# Patient Record
Sex: FEMALE | Race: WHITE | NOT HISPANIC OR LATINO | URBAN - METROPOLITAN AREA
[De-identification: names, ages, dates, MRNs, and addresses within clinical notes are randomized per-mention and may not be internally consistent; named-entity substitution may affect disease eponyms.]

---

## 2018-04-03 ENCOUNTER — INPATIENT (INPATIENT)
Facility: HOSPITAL | Age: 78
LOS: 2 days | Discharge: NURSING, REHAB OR LONG-TERM CARE | End: 2018-04-06
Attending: INTERNAL MEDICINE | Admitting: INTERNAL MEDICINE
Payer: MEDICARE

## 2018-04-03 VITALS
TEMPERATURE: 98 F | SYSTOLIC BLOOD PRESSURE: 145 MMHG | DIASTOLIC BLOOD PRESSURE: 84 MMHG | HEART RATE: 92 BPM | RESPIRATION RATE: 18 BRPM | OXYGEN SATURATION: 97 %

## 2018-04-03 DIAGNOSIS — Z98.890 OTHER SPECIFIED POSTPROCEDURAL STATES: Chronic | ICD-10-CM

## 2018-04-03 DIAGNOSIS — M25.561 PAIN IN RIGHT KNEE: ICD-10-CM

## 2018-04-03 DIAGNOSIS — M54.5 LOW BACK PAIN: ICD-10-CM

## 2018-04-03 DIAGNOSIS — L40.50 ARTHROPATHIC PSORIASIS, UNSPECIFIED: ICD-10-CM

## 2018-04-03 DIAGNOSIS — Z98.891 HISTORY OF UTERINE SCAR FROM PREVIOUS SURGERY: Chronic | ICD-10-CM

## 2018-04-03 DIAGNOSIS — N32.81 OVERACTIVE BLADDER: ICD-10-CM

## 2018-04-03 DIAGNOSIS — Z96.612 PRESENCE OF LEFT ARTIFICIAL SHOULDER JOINT: Chronic | ICD-10-CM

## 2018-04-03 DIAGNOSIS — G47.33 OBSTRUCTIVE SLEEP APNEA (ADULT) (PEDIATRIC): ICD-10-CM

## 2018-04-03 DIAGNOSIS — L30.4 ERYTHEMA INTERTRIGO: ICD-10-CM

## 2018-04-03 DIAGNOSIS — Z29.9 ENCOUNTER FOR PROPHYLACTIC MEASURES, UNSPECIFIED: ICD-10-CM

## 2018-04-03 DIAGNOSIS — E03.9 HYPOTHYROIDISM, UNSPECIFIED: ICD-10-CM

## 2018-04-03 DIAGNOSIS — J42 UNSPECIFIED CHRONIC BRONCHITIS: ICD-10-CM

## 2018-04-03 DIAGNOSIS — E66.01 MORBID (SEVERE) OBESITY DUE TO EXCESS CALORIES: ICD-10-CM

## 2018-04-03 LAB
ALBUMIN SERPL ELPH-MCNC: 4 G/DL — SIGNIFICANT CHANGE UP (ref 3.3–5)
ALP SERPL-CCNC: 53 U/L — SIGNIFICANT CHANGE UP (ref 40–120)
ALT FLD-CCNC: 11 U/L — SIGNIFICANT CHANGE UP (ref 4–33)
APTT BLD: 30.1 SEC — SIGNIFICANT CHANGE UP (ref 27.5–37.4)
AST SERPL-CCNC: 20 U/L — SIGNIFICANT CHANGE UP (ref 4–32)
BASE EXCESS BLDV CALC-SCNC: 1 MMOL/L — SIGNIFICANT CHANGE UP
BILIRUB SERPL-MCNC: 0.4 MG/DL — SIGNIFICANT CHANGE UP (ref 0.2–1.2)
BLOOD GAS VENOUS - CREATININE: < 0.36 MG/DL — LOW (ref 0.5–1.3)
BUN SERPL-MCNC: 9 MG/DL — SIGNIFICANT CHANGE UP (ref 7–23)
CALCIUM SERPL-MCNC: 9.2 MG/DL — SIGNIFICANT CHANGE UP (ref 8.4–10.5)
CHLORIDE BLDV-SCNC: 105 MMOL/L — SIGNIFICANT CHANGE UP (ref 96–108)
CHLORIDE SERPL-SCNC: 101 MMOL/L — SIGNIFICANT CHANGE UP (ref 98–107)
CO2 SERPL-SCNC: 22 MMOL/L — SIGNIFICANT CHANGE UP (ref 22–31)
CREAT SERPL-MCNC: 0.44 MG/DL — LOW (ref 0.5–1.3)
GAS PNL BLDV: 136 MMOL/L — SIGNIFICANT CHANGE UP (ref 136–146)
GLUCOSE BLDV-MCNC: 95 — SIGNIFICANT CHANGE UP (ref 70–99)
GLUCOSE SERPL-MCNC: 100 MG/DL — HIGH (ref 70–99)
HCO3 BLDV-SCNC: 25 MMOL/L — SIGNIFICANT CHANGE UP (ref 20–27)
HCT VFR BLD CALC: 40.1 % — SIGNIFICANT CHANGE UP (ref 34.5–45)
HCT VFR BLDV CALC: 39.3 % — SIGNIFICANT CHANGE UP (ref 34.5–45)
HGB BLD-MCNC: 12.6 G/DL — SIGNIFICANT CHANGE UP (ref 11.5–15.5)
HGB BLDV-MCNC: 12.8 G/DL — SIGNIFICANT CHANGE UP (ref 11.5–15.5)
INR BLD: 1.02 — SIGNIFICANT CHANGE UP (ref 0.88–1.17)
LACTATE BLDV-MCNC: 1.6 MMOL/L — SIGNIFICANT CHANGE UP (ref 0.5–2)
MCHC RBC-ENTMCNC: 30.6 PG — SIGNIFICANT CHANGE UP (ref 27–34)
MCHC RBC-ENTMCNC: 31.4 % — LOW (ref 32–36)
MCV RBC AUTO: 97.3 FL — SIGNIFICANT CHANGE UP (ref 80–100)
NRBC # FLD: 0 — SIGNIFICANT CHANGE UP
PCO2 BLDV: 37 MMHG — LOW (ref 41–51)
PH BLDV: 7.44 PH — HIGH (ref 7.32–7.43)
PLATELET # BLD AUTO: 187 K/UL — SIGNIFICANT CHANGE UP (ref 150–400)
PMV BLD: 11 FL — SIGNIFICANT CHANGE UP (ref 7–13)
PO2 BLDV: 59 MMHG — HIGH (ref 35–40)
POTASSIUM BLDV-SCNC: 4.9 MMOL/L — HIGH (ref 3.4–4.5)
POTASSIUM SERPL-MCNC: 4.2 MMOL/L — SIGNIFICANT CHANGE UP (ref 3.5–5.3)
POTASSIUM SERPL-SCNC: 4.2 MMOL/L — SIGNIFICANT CHANGE UP (ref 3.5–5.3)
PROT SERPL-MCNC: 8 G/DL — SIGNIFICANT CHANGE UP (ref 6–8.3)
PROTHROM AB SERPL-ACNC: 11.3 SEC — SIGNIFICANT CHANGE UP (ref 9.8–13.1)
RBC # BLD: 4.12 M/UL — SIGNIFICANT CHANGE UP (ref 3.8–5.2)
RBC # FLD: 14.1 % — SIGNIFICANT CHANGE UP (ref 10.3–14.5)
SAO2 % BLDV: 89.9 % — HIGH (ref 60–85)
SODIUM SERPL-SCNC: 137 MMOL/L — SIGNIFICANT CHANGE UP (ref 135–145)
WBC # BLD: 8.77 K/UL — SIGNIFICANT CHANGE UP (ref 3.8–10.5)
WBC # FLD AUTO: 8.77 K/UL — SIGNIFICANT CHANGE UP (ref 3.8–10.5)

## 2018-04-03 PROCEDURE — 71045 X-RAY EXAM CHEST 1 VIEW: CPT | Mod: 26

## 2018-04-03 PROCEDURE — 99223 1ST HOSP IP/OBS HIGH 75: CPT

## 2018-04-03 RX ORDER — ASPIRIN/CALCIUM CARB/MAGNESIUM 324 MG
81 TABLET ORAL DAILY
Qty: 0 | Refills: 0 | Status: DISCONTINUED | OUTPATIENT
Start: 2018-04-03 | End: 2018-04-06

## 2018-04-03 RX ORDER — CAPSAICIN 0.025 %
1 CREAM (GRAM) TOPICAL
Qty: 0 | Refills: 0 | Status: DISCONTINUED | OUTPATIENT
Start: 2018-04-03 | End: 2018-04-06

## 2018-04-03 RX ORDER — ENOXAPARIN SODIUM 100 MG/ML
40 INJECTION SUBCUTANEOUS EVERY 24 HOURS
Qty: 0 | Refills: 0 | Status: DISCONTINUED | OUTPATIENT
Start: 2018-04-03 | End: 2018-04-06

## 2018-04-03 RX ORDER — BACLOFEN 100 %
10 POWDER (GRAM) MISCELLANEOUS THREE TIMES A DAY
Qty: 0 | Refills: 0 | Status: DISCONTINUED | OUTPATIENT
Start: 2018-04-03 | End: 2018-04-06

## 2018-04-03 RX ORDER — LEVOTHYROXINE SODIUM 125 MCG
50 TABLET ORAL DAILY
Qty: 0 | Refills: 0 | Status: DISCONTINUED | OUTPATIENT
Start: 2018-04-03 | End: 2018-04-06

## 2018-04-03 RX ORDER — ACETAMINOPHEN 500 MG
650 TABLET ORAL EVERY 6 HOURS
Qty: 0 | Refills: 0 | Status: DISCONTINUED | OUTPATIENT
Start: 2018-04-03 | End: 2018-04-06

## 2018-04-03 RX ORDER — ASCORBIC ACID 60 MG
500 TABLET,CHEWABLE ORAL
Qty: 0 | Refills: 0 | Status: DISCONTINUED | OUTPATIENT
Start: 2018-04-03 | End: 2018-04-06

## 2018-04-03 RX ORDER — IBUPROFEN 200 MG
600 TABLET ORAL ONCE
Qty: 0 | Refills: 0 | Status: COMPLETED | OUTPATIENT
Start: 2018-04-03 | End: 2018-04-03

## 2018-04-03 RX ORDER — FLUTICASONE PROPIONATE 50 MCG
2 SPRAY, SUSPENSION NASAL DAILY
Qty: 0 | Refills: 0 | Status: DISCONTINUED | OUTPATIENT
Start: 2018-04-03 | End: 2018-04-06

## 2018-04-03 RX ORDER — BUDESONIDE AND FORMOTEROL FUMARATE DIHYDRATE 160; 4.5 UG/1; UG/1
2 AEROSOL RESPIRATORY (INHALATION)
Qty: 0 | Refills: 0 | Status: DISCONTINUED | OUTPATIENT
Start: 2018-04-03 | End: 2018-04-06

## 2018-04-03 RX ORDER — NYSTATIN CREAM 100000 [USP'U]/G
1 CREAM TOPICAL
Qty: 0 | Refills: 0 | Status: DISCONTINUED | OUTPATIENT
Start: 2018-04-03 | End: 2018-04-06

## 2018-04-03 RX ORDER — GABAPENTIN 400 MG/1
600 CAPSULE ORAL THREE TIMES A DAY
Qty: 0 | Refills: 0 | Status: DISCONTINUED | OUTPATIENT
Start: 2018-04-03 | End: 2018-04-06

## 2018-04-03 RX ORDER — ATORVASTATIN CALCIUM 80 MG/1
80 TABLET, FILM COATED ORAL AT BEDTIME
Qty: 0 | Refills: 0 | Status: DISCONTINUED | OUTPATIENT
Start: 2018-04-03 | End: 2018-04-06

## 2018-04-03 RX ORDER — SULFASALAZINE 500 MG
1000 TABLET ORAL
Qty: 0 | Refills: 0 | Status: DISCONTINUED | OUTPATIENT
Start: 2018-04-03 | End: 2018-04-06

## 2018-04-03 RX ORDER — LORATADINE 10 MG/1
10 TABLET ORAL DAILY
Qty: 0 | Refills: 0 | Status: DISCONTINUED | OUTPATIENT
Start: 2018-04-03 | End: 2018-04-06

## 2018-04-03 RX ORDER — MONTELUKAST 4 MG/1
10 TABLET, CHEWABLE ORAL DAILY
Qty: 0 | Refills: 0 | Status: DISCONTINUED | OUTPATIENT
Start: 2018-04-03 | End: 2018-04-06

## 2018-04-03 RX ORDER — FAMOTIDINE 10 MG/ML
20 INJECTION INTRAVENOUS
Qty: 0 | Refills: 0 | Status: DISCONTINUED | OUTPATIENT
Start: 2018-04-03 | End: 2018-04-06

## 2018-04-03 RX ORDER — CEPHALEXIN 500 MG
500 CAPSULE ORAL ONCE
Qty: 0 | Refills: 0 | Status: COMPLETED | OUTPATIENT
Start: 2018-04-03 | End: 2018-04-03

## 2018-04-03 RX ORDER — OXYBUTYNIN CHLORIDE 5 MG
5 TABLET ORAL
Qty: 0 | Refills: 0 | Status: DISCONTINUED | OUTPATIENT
Start: 2018-04-03 | End: 2018-04-06

## 2018-04-03 RX ADMIN — ENOXAPARIN SODIUM 40 MILLIGRAM(S): 100 INJECTION SUBCUTANEOUS at 22:56

## 2018-04-03 RX ADMIN — GABAPENTIN 600 MILLIGRAM(S): 400 CAPSULE ORAL at 23:20

## 2018-04-03 RX ADMIN — Medication 600 MILLIGRAM(S): at 20:46

## 2018-04-03 RX ADMIN — Medication 600 MILLIGRAM(S): at 18:33

## 2018-04-03 RX ADMIN — Medication 5 MILLIGRAM(S): at 22:56

## 2018-04-03 RX ADMIN — NYSTATIN CREAM 1 APPLICATION(S): 100000 CREAM TOPICAL at 23:21

## 2018-04-03 RX ADMIN — BUDESONIDE AND FORMOTEROL FUMARATE DIHYDRATE 2 PUFF(S): 160; 4.5 AEROSOL RESPIRATORY (INHALATION) at 23:21

## 2018-04-03 RX ADMIN — Medication 500 MILLIGRAM(S): at 18:33

## 2018-04-03 RX ADMIN — ATORVASTATIN CALCIUM 80 MILLIGRAM(S): 80 TABLET, FILM COATED ORAL at 22:56

## 2018-04-03 NOTE — H&P ADULT - PROBLEM SELECTOR PLAN 1
c/w topicals, acetaminophen PRN  fall precautions, PT consult  outpatient f/u with orthopedic surgeon  will check inflammatory markers with AM labs  no signs of infection, afebrile, no warmth/erythema of knees, no leukocytosis

## 2018-04-03 NOTE — ED PROVIDER NOTE - TOBACCO USE
bilateral upper extremity ROM was WFL (within functional limits)/bilateral lower extremity ROM was WFL (within functional limits)
Never smoker

## 2018-04-03 NOTE — H&P ADULT - ATTENDING COMMENTS
Problem #11 - Cough  likely post-viral cough  f/u official read of CXR  c/w flonase for likely PND component

## 2018-04-03 NOTE — ED ADULT NURSE NOTE - OBJECTIVE STATEMENT
p/t is a obese 77y old female received awake and responsive, c/o of pain to bilateral lower extremities for past few days, p/t denies any trauma, bloods drawn and sent to lab no further c/o noted will continue to monitor

## 2018-04-03 NOTE — H&P ADULT - NSHPPHYSICALEXAM_GEN_ALL_CORE
PHYSICAL EXAM:  GENERAL: morbidly obese, NAD, well-developed, well-nourished  HEAD:  Atraumatic, Normocephalic  EYES: EOMI, PERRLA, conjunctiva and sclera clear  NECK: Supple, No JVD  CHEST/LUNG: Clear to auscultation bilaterally; No wheezes, rales or rhonchi  HEART: Regular rate and rhythm; No murmurs, rubs, or gallops, (+)S1, S2  BACK: normal shape, no spinal bony tenderness or paraspinal tenderness to palpation; localizes back pain to sacroiliac joints  ABDOMEN: Obese, Soft, Nontender, Nondistended; Normal Bowel sounds   EXTREMITIES:  2+ Radial Pulses, 1+ pedal pulses, No clubbing, cyanosis, trace LE edema b/l  PSYCH: normal mood and affect  NEUROLOGY: AAOx3, non-focal, ROM knees intact - pain with passive movement; sensation grossly intact  SKIN: venous stasis erythema bilateral shins, healing 1cm lesions on LLE with overlying serous crusting without drainage; intertrigo in pannus; inframammary

## 2018-04-03 NOTE — ED PROVIDER NOTE - PHYSICAL EXAMINATION
Gen: AAOx3, non-toxic  Head: NCAT  HEENT: EOMI, oral mucosa moist, normal conjunctiva  Lung: CTAB, no respiratory distress, no wheezes/rhonchi/rales B/L, speaking in full sentences  CV: RRR, no murmurs, rubs or gallops  Abd: soft, NTND, no guarding  MSK: no visible deformities, full knee ROM bilaterally, erythema and rubor to bilateral shins  Neuro: No focal sensory or motor deficits  Skin: Warm, well perfused, no rash  Psych: normal affect.   ~Baylee Keys M.D. Resident

## 2018-04-03 NOTE — ED PROVIDER NOTE - OBJECTIVE STATEMENT
78 yo F PMHx hypothyroidism, psoriatic arthritis, osteoarthritis p/w bilateral knee pain. Pt lives in an assisted living facility and needs to go down stairs to the dining ang. She usually has pain in her knees but for the past week the pain has become worse and she is now having trouble walking. She has had multiple knee joint injections with last injection March 1 via orthopedics. She denies fevers/chills, numbness/tingling in her legs, CP/palpitations, SOB, abd pain, N/V, dysuria.

## 2018-04-03 NOTE — H&P ADULT - NSHPOUTPATIENTPROVIDERS_GEN_ALL_CORE
PMD - Dr. Lisha Man [phone]  Psychiatrist - Dr. Avinash Eldridge [phone] PMD - Dr. Lisha Man [phone]  Psychiatrist - Dr. Avinash Eldridge [phone]  Orthopedist - Dr. Aaron Martines  Pain management - Dr. Donaldson

## 2018-04-03 NOTE — H&P ADULT - PMH
Basal cell carcinoma  LLE s/p Moh's excision  Chronic bronchitis    Hypothyroid    Osteoarthritis    Overactive bladder    Psoriatic arthritis    Squamous cell carcinoma  LLE s/p Moh's excision

## 2018-04-03 NOTE — H&P ADULT - HISTORY OF PRESENT ILLNESS
77-year-old female with hypothyroidism, psoriatic arthritis, osteoarthritis p/w bilateral knee pain. Pt lives in an assisted living facility and needs to go down stairs to the dining ang.  She has been receiving intermittent injections in her knees, last injection was 3/01/18 by her orthopedic surgeon.    In the ED VS:  98.2  90-92  140-145/77-84  18  97%RA, received cephalexin 500mg PO x1, ibuprofen 600mg PO x1, 77-year-old female with hypothyroidism, COPD, GERD, psoriatic arthritis, osteoarthritis, SUZIE on CPAP, neuropathy, HLD, anxiety p/w bilateral knee pain. Pt lives in an assisted living facility and needs to go down stairs to the dining ang.  She has been receiving intermittent injections in her knees, last injection was 3/01/18 by her orthopedic surgeon.    In the ED VS:  98.2  90-92  140-145/77-84  18  97%RA, received cephalexin 500mg PO x1, ibuprofen 600mg PO x1, 77-year-old female with hypothyroidism, COPD, GERD, psoriatic arthritis, osteoarthritis, SUZIE on CPAP, neuropathy, HLD, anxiety p/w worsening right knee pain. Pt lives in an assisted living facility and needs to go down stairs to the dining ang, reports there are elevators in the facility however.  She has been receiving intermittent gel injections in her knees, as well as steroid injections in her lower back last injection was 3/01/18 by her orthopedic surgeon.  She reports worsening right knee pain for the past 2 weeks, worse with movement/ambulation/standing.  No known trauma.  No history of joint infections. Pain minimally relieved with acetaminophen and ibuprofen that patient takes PRN.  She denies fevers, chills, chest pain, palpitations, shortness of breath, abdominal pain, diarrhea, constipation, dysuria, hematuria, rashes/skin changes, numbness/paresthesias in groin or bilateral LE.  She reports ambulating with a walker of recent, no recent falls.  Reports a viral URI about a week ago, has dry cough, occasional nausea, decreased appetite, and continued sinus congestion that has been improving.  States redness of bilateral anterior shins appeared 2-3 months ago, had dopplers performed at the assisted living facility that were reportedly negative.    In the ED VS:  98.2  90-92  140-145/77-84  18  97%RA, received cephalexin 500mg PO x1, ibuprofen 600mg PO x1

## 2018-04-03 NOTE — ED ADULT TRIAGE NOTE - CHIEF COMPLAINT QUOTE
Pt c/o b/l leg pain x3 weeks that is not getting any better. Pt states " I was sick with a virus and I have not been able to see my Orthopedic doctor and now I can hardly walk." Pt comes from Assisted living and states that she has been unable to get out of her room to eat for 2 days. Also endorses cough and nausea. Denies CP or SOB.

## 2018-04-03 NOTE — ED PROVIDER NOTE - NS ED ROS FT
GENERAL: No fever or chills, EYES: no change in vision, HEENT: no trouble swallowing or speaking, CARDIAC: no chest pain, PULMONARY: no cough or SOB, GI: no abdominal pain, no nausea, no vomiting, no diarrhea or constipation, : No changes in urination, SKIN: no rashes, NEURO: no headache,  MSK: bilateral knee pain ~Baylee Keys M.D. Resident

## 2018-04-03 NOTE — H&P ADULT - ASSESSMENT
77-year-old female with hypothyroidism, COPD, GERD, psoriatic arthritis, osteoarthritis, SUZIE on CPAP, neuropathy, HLD, anxiety p/w worsening right knee pain.

## 2018-04-03 NOTE — H&P ADULT - NSHPSOCIALHISTORY_GEN_ALL_CORE
Lives at [facility] [phone]  HCP - son Ryland Whittaker [phone] Lives at [facility] [phone]  HCP - son Ryland Whittaker [phone]  Never smoker, no alcohol or illicit drug use

## 2018-04-03 NOTE — ED PROVIDER NOTE - ATTENDING CONTRIBUTION TO CARE
MD Gallagher:  I performed a face to face bedside interview with patient regarding history of present illness, review of symptoms and past medical history. I completed an independent physical exam(documented below).  I have discussed patient's plan of care with resident.   I agree with note as stated above, having amended the EMR as needed to reflect my findings. I have discussed the assessment and plan of care.  This includes during the time I functioned as the attending physician for this patient.  PE:  Gen: obese, Alert, NAD  Head: NC, AT,  EOMI, normal lids/conjunctiva  ENT:  normal hearing, patent oropharynx without erythema/exudate, uvula midline  Neck: +supple, no tenderness/meningismus/JVD, +Trachea midline  Chest: no chest wall tenderness, equal chest rise  Pulm: Bilateral BS, normal resp effort, no wheeze/stridor/retractions  CV: RRR, no M/R/G, +dist pulses  Abd: soft, NT/ND, +BS, no hepatosplenomegaly  Rectal: deferred  Mskel: edema of right knee compared to left, no ttp  Skin: erythema b/l ant legs, warm, +ttp  Neuro: AAOx3, no sensory/motor deficits, CN 2-12 intact   MDM:  78yo F w/ pmh inclusive of psoriatic arthritis, OA w/ frequent "gel injections", sent from [facility] for b/l leg pain from knee down (greatest at right knee). Noticed appearance of red "rash" on b/l shins today, not-pruritic. Does report "feeling warm" yesterday. No known hx of cellulitis, and unlikely to be same ( given it is b/l), but since pt just noticed erythema today and had associated subjective fever, will treat.  Do NOT suspect septic joint (able to range knee joint easily without pain) and pt is overall well appearing. No DVT risk factors, and pt had recent outpt dvt study which was negative. Pt unable to walk here, and needs to be ambulatory to return to [facility], therefore admit for PT eval and Case mngmt consult.

## 2018-04-03 NOTE — ED PROVIDER NOTE - MEDICAL DECISION MAKING DETAILS
76 yo F PMHx hypothyroidism, psoriatic arthritis, osteoarthritis p/w progressive bilateral knee pain, will obtain basic labs, pain control

## 2018-04-03 NOTE — H&P ADULT - NSHPREVIEWOFSYSTEMS_GEN_ALL_CORE
REVIEW OF SYSTEMS:    CONSTITUTIONAL: No weakness, fevers or chills  EYES/ENT: No visual changes;  No dysphagia; (+)sinus congestion   NECK: No pain or stiffness  RESPIRATORY: (+) cough; No wheezing, hemoptysis; No shortness of breath; had a viral URI about a week ago  CARDIOVASCULAR: No chest pain or palpitations; No lower extremity edema  GASTROINTESTINAL: No abdominal or epigastric pain. (+) nausea; No vomiting hematemesis; No diarrhea or constipation. No melena or hematochezia.  GENITOURINARY: No dysuria, frequency or hematuria; occasional urge incontinence due to inability to ambulate to bathroom on time  NEUROLOGICAL: No numbness or weakness; occasional paresthesias in fingertips of bilateral hands (history of carpal tunnel b/l)  SKIN: No itching, burning, rashes, or lesions   All other review of systems is negative unless indicated above.

## 2018-04-03 NOTE — H&P ADULT - FAMILY HISTORY
Father  Still living? No  Family history of CHF (congestive heart failure), Age at diagnosis: Age Unknown     Mother  Still living? Yes, Estimated age:   Family history of hypertension in mother, Age at diagnosis: Age Unknown

## 2018-04-03 NOTE — H&P ADULT - NSHPLABSRESULTS_GEN_ALL_CORE
04-03    137  |  101  |  9   ----------------------------<  100<H>  4.2   |  22  |  0.44<L>    Ca    9.2      03 Apr 2018 17:40    TPro  8.0  /  Alb  4.0  /  TBili  0.4  /  DBili  x   /  AST  20  /  ALT  11  /  AlkPhos  53  04-03                        12.6   8.77  )-----------( 187      ( 03 Apr 2018 17:40 )             40.1     LIVER FUNCTIONS - ( 03 Apr 2018 17:40 )  Alb: 4.0 g/dL / Pro: 8.0 g/dL / ALK PHOS: 53 u/L / ALT: 11 u/L / AST: 20 u/L / GGT: x           PT/INR - ( 03 Apr 2018 17:40 )   PT: 11.3 SEC;   INR: 1.02     PTT - ( 03 Apr 2018 17:40 )  PTT:30.1 SEC    17:40 - VBG - pH: 7.44  | pCO2: 37    | pO2: 59    | Lactate: 1.6      CXR personally reviewed - no focal consolidations, ?hilar LAD on R

## 2018-04-04 LAB
BASOPHILS # BLD AUTO: 0.03 K/UL — SIGNIFICANT CHANGE UP (ref 0–0.2)
BASOPHILS NFR BLD AUTO: 0.5 % — SIGNIFICANT CHANGE UP (ref 0–2)
BUN SERPL-MCNC: 11 MG/DL — SIGNIFICANT CHANGE UP (ref 7–23)
CALCIUM SERPL-MCNC: 8.6 MG/DL — SIGNIFICANT CHANGE UP (ref 8.4–10.5)
CHLORIDE SERPL-SCNC: 101 MMOL/L — SIGNIFICANT CHANGE UP (ref 98–107)
CO2 SERPL-SCNC: 26 MMOL/L — SIGNIFICANT CHANGE UP (ref 22–31)
CREAT SERPL-MCNC: 0.46 MG/DL — LOW (ref 0.5–1.3)
CRP SERPL-MCNC: 5.7 MG/L — HIGH
EOSINOPHIL # BLD AUTO: 0.19 K/UL — SIGNIFICANT CHANGE UP (ref 0–0.5)
EOSINOPHIL NFR BLD AUTO: 3.4 % — SIGNIFICANT CHANGE UP (ref 0–6)
ERYTHROCYTE [SEDIMENTATION RATE] IN BLOOD: 37 MM/HR — HIGH (ref 4–25)
GLUCOSE SERPL-MCNC: 102 MG/DL — HIGH (ref 70–99)
HBA1C BLD-MCNC: 4.8 % — SIGNIFICANT CHANGE UP (ref 4–5.6)
HCT VFR BLD CALC: 36.4 % — SIGNIFICANT CHANGE UP (ref 34.5–45)
HGB BLD-MCNC: 11.1 G/DL — LOW (ref 11.5–15.5)
IMM GRANULOCYTES # BLD AUTO: 0.02 # — SIGNIFICANT CHANGE UP
IMM GRANULOCYTES NFR BLD AUTO: 0.4 % — SIGNIFICANT CHANGE UP (ref 0–1.5)
LYMPHOCYTES # BLD AUTO: 0.72 K/UL — LOW (ref 1–3.3)
LYMPHOCYTES # BLD AUTO: 12.8 % — LOW (ref 13–44)
MCHC RBC-ENTMCNC: 29.6 PG — SIGNIFICANT CHANGE UP (ref 27–34)
MCHC RBC-ENTMCNC: 30.5 % — LOW (ref 32–36)
MCV RBC AUTO: 97.1 FL — SIGNIFICANT CHANGE UP (ref 80–100)
MONOCYTES # BLD AUTO: 0.68 K/UL — SIGNIFICANT CHANGE UP (ref 0–0.9)
MONOCYTES NFR BLD AUTO: 12.1 % — SIGNIFICANT CHANGE UP (ref 2–14)
NEUTROPHILS # BLD AUTO: 3.99 K/UL — SIGNIFICANT CHANGE UP (ref 1.8–7.4)
NEUTROPHILS NFR BLD AUTO: 70.8 % — SIGNIFICANT CHANGE UP (ref 43–77)
NRBC # FLD: 0 — SIGNIFICANT CHANGE UP
PLATELET # BLD AUTO: 171 K/UL — SIGNIFICANT CHANGE UP (ref 150–400)
PMV BLD: 11.1 FL — SIGNIFICANT CHANGE UP (ref 7–13)
POTASSIUM SERPL-MCNC: 3.4 MMOL/L — LOW (ref 3.5–5.3)
POTASSIUM SERPL-SCNC: 3.4 MMOL/L — LOW (ref 3.5–5.3)
RBC # BLD: 3.75 M/UL — LOW (ref 3.8–5.2)
RBC # FLD: 14.3 % — SIGNIFICANT CHANGE UP (ref 10.3–14.5)
SODIUM SERPL-SCNC: 138 MMOL/L — SIGNIFICANT CHANGE UP (ref 135–145)
TSH SERPL-MCNC: 2.38 UIU/ML — SIGNIFICANT CHANGE UP (ref 0.27–4.2)
WBC # BLD: 5.63 K/UL — SIGNIFICANT CHANGE UP (ref 3.8–10.5)
WBC # FLD AUTO: 5.63 K/UL — SIGNIFICANT CHANGE UP (ref 3.8–10.5)

## 2018-04-04 RX ORDER — POTASSIUM CHLORIDE 20 MEQ
40 PACKET (EA) ORAL ONCE
Qty: 0 | Refills: 0 | Status: COMPLETED | OUTPATIENT
Start: 2018-04-04 | End: 2018-04-04

## 2018-04-04 RX ORDER — OXYCODONE HYDROCHLORIDE 5 MG/1
5 TABLET ORAL EVERY 6 HOURS
Qty: 0 | Refills: 0 | Status: DISCONTINUED | OUTPATIENT
Start: 2018-04-04 | End: 2018-04-06

## 2018-04-04 RX ADMIN — Medication 5 MILLIGRAM(S): at 06:49

## 2018-04-04 RX ADMIN — Medication 1000 MILLIGRAM(S): at 06:49

## 2018-04-04 RX ADMIN — Medication 40 MILLIEQUIVALENT(S): at 14:07

## 2018-04-04 RX ADMIN — GABAPENTIN 600 MILLIGRAM(S): 400 CAPSULE ORAL at 22:26

## 2018-04-04 RX ADMIN — MONTELUKAST 10 MILLIGRAM(S): 4 TABLET, CHEWABLE ORAL at 11:54

## 2018-04-04 RX ADMIN — Medication 1 TABLET(S): at 14:08

## 2018-04-04 RX ADMIN — Medication 1 APPLICATION(S): at 06:51

## 2018-04-04 RX ADMIN — FAMOTIDINE 20 MILLIGRAM(S): 10 INJECTION INTRAVENOUS at 17:38

## 2018-04-04 RX ADMIN — Medication 81 MILLIGRAM(S): at 11:54

## 2018-04-04 RX ADMIN — BUDESONIDE AND FORMOTEROL FUMARATE DIHYDRATE 2 PUFF(S): 160; 4.5 AEROSOL RESPIRATORY (INHALATION) at 09:49

## 2018-04-04 RX ADMIN — Medication 5 MILLIGRAM(S): at 17:38

## 2018-04-04 RX ADMIN — LORATADINE 10 MILLIGRAM(S): 10 TABLET ORAL at 11:54

## 2018-04-04 RX ADMIN — Medication 650 MILLIGRAM(S): at 14:15

## 2018-04-04 RX ADMIN — NYSTATIN CREAM 1 APPLICATION(S): 100000 CREAM TOPICAL at 17:38

## 2018-04-04 RX ADMIN — OXYCODONE HYDROCHLORIDE 5 MILLIGRAM(S): 5 TABLET ORAL at 17:38

## 2018-04-04 RX ADMIN — Medication 2 SPRAY(S): at 14:08

## 2018-04-04 RX ADMIN — GABAPENTIN 600 MILLIGRAM(S): 400 CAPSULE ORAL at 06:49

## 2018-04-04 RX ADMIN — OXYCODONE HYDROCHLORIDE 5 MILLIGRAM(S): 5 TABLET ORAL at 18:30

## 2018-04-04 RX ADMIN — Medication 500 MILLIGRAM(S): at 17:38

## 2018-04-04 RX ADMIN — Medication 1000 MILLIGRAM(S): at 17:38

## 2018-04-04 RX ADMIN — ATORVASTATIN CALCIUM 80 MILLIGRAM(S): 80 TABLET, FILM COATED ORAL at 22:26

## 2018-04-04 RX ADMIN — Medication 1 APPLICATION(S): at 17:37

## 2018-04-04 RX ADMIN — FAMOTIDINE 20 MILLIGRAM(S): 10 INJECTION INTRAVENOUS at 06:50

## 2018-04-04 RX ADMIN — Medication 50 MICROGRAM(S): at 06:49

## 2018-04-04 RX ADMIN — Medication 650 MILLIGRAM(S): at 15:00

## 2018-04-04 RX ADMIN — ENOXAPARIN SODIUM 40 MILLIGRAM(S): 100 INJECTION SUBCUTANEOUS at 22:26

## 2018-04-04 RX ADMIN — BUDESONIDE AND FORMOTEROL FUMARATE DIHYDRATE 2 PUFF(S): 160; 4.5 AEROSOL RESPIRATORY (INHALATION) at 22:27

## 2018-04-04 RX ADMIN — GABAPENTIN 600 MILLIGRAM(S): 400 CAPSULE ORAL at 14:07

## 2018-04-04 RX ADMIN — NYSTATIN CREAM 1 APPLICATION(S): 100000 CREAM TOPICAL at 06:50

## 2018-04-04 NOTE — PROGRESS NOTE ADULT - SUBJECTIVE AND OBJECTIVE BOX
CHIEF COMPLAINT:Patient is a 77y old  Female who presents with a chief complaint of right knee pain (2018 20:17)      Allergies:  erythromycin (Rash)  latex (Rash)  Milk (Rash)  phenobarbital (Rash)      PAST MEDICAL & SURGICAL HISTORY:  Squamous cell carcinoma: LLE s/p Moh&#x27;s excision  Basal cell carcinoma: LLE s/p Moh&#x27;s excision  Overactive bladder  Osteoarthritis  Psoriatic arthritis  Chronic bronchitis  Hypothyroid  H/O:   H/O total shoulder replacement, left  History of carpal tunnel release: L wrist      FAMILY HISTORY:  Family history of hypertension in mother (Mother)  Family history of CHF (congestive heart failure) (Father): Father,  49y/o      REVIEW OF SYSTEMS:  CONSTITUTIONAL: No fever, weight loss, or fatigue  EYES: No eye pain, visual disturbances, or discharge  NECK: No pain or stiffness  RESPIRATORY: No cough or wheezing, no shortness of breath  CARDIOVASCULAR: No chest pain, palpitations, dizziness, or leg swelling  GASTROINTESTINAL: No abdominal or epigastric pain. No nausea, vomiting, diarrhea or constipation  GENITOURINARY: No dysuria, urinary frequency or urgency, no hematuria  NEUROLOGICAL: No headaches, memory loss, loss of strength, numbness, or tremors  SKIN: No itching, burning, rashes, or lesions   MUSCULOSKELETAL: + R knee pain    Medications:  MEDICATIONS  (STANDING):  ascorbic acid 500 milliGRAM(s) Oral two times a day  aspirin enteric coated 81 milliGRAM(s) Oral daily  atorvastatin 80 milliGRAM(s) Oral at bedtime  buDESOnide 160 MICROgram(s)/formoterol 4.5 MICROgram(s) Inhaler 2 Puff(s) Inhalation two times a day  calcium carbonate 1250 mG + Vitamin D (OsCal 500 + D) 1 Tablet(s) Oral daily  capsaicin HP 0.075% Cream 1 Application(s) Topical two times a day  enoxaparin Injectable 40 milliGRAM(s) SubCutaneous every 24 hours  famotidine    Tablet 20 milliGRAM(s) Oral two times a day  fluticasone propionate 50 MICROgram(s)/spray Nasal Spray 2 Spray(s) Both Nostrils daily  gabapentin 600 milliGRAM(s) Oral three times a day  levothyroxine 50 MICROGram(s) Oral daily  loratadine 10 milliGRAM(s) Oral daily  montelukast 10 milliGRAM(s) Oral daily  nystatin Powder 1 Application(s) Topical two times a day  oxybutynin 5 milliGRAM(s) Oral two times a day  sulfaSALAzine 1000 milliGRAM(s) Oral two times a day    MEDICATIONS  (PRN):  acetaminophen   Tablet. 650 milliGRAM(s) Oral every 6 hours PRN Mild to Moderate Pain (1 - 6)  baclofen 10 milliGRAM(s) Oral three times a day PRN back pain/spasm  oxyCODONE    IR 5 milliGRAM(s) Oral every 6 hours PRN Severe Pain (7 - 10)    	    PHYSICAL EXAM:  T(C): 37 (18 @ 13:01), Max: 37.1 (18 @ 21:39)  HR: 72 (18 @ 16:10) (68 - 94)  BP: 167/64 (18 @ 13:01) (152/62 - 167/64)  RR: 18 (18 @ 13:01) (18 - 19)  SpO2: 94% (18 @ 16:10) (93% - 97%)  Wt(kg): --  I&O's Summary      Appearance: Normal	  HEENT:   NCAT, PERRL, EOMI	  Lymphatic: No lymphadenopathy  Cardiovascular: Normal S1 S2, RRR  Respiratory: Lungs clear to auscultation BL  Psychiatry: A & O x 3, Mood & affect appropriate  Gastrointestinal:  Soft, Non-tender, + BS  Skin: No rashes, No ecchymoses, No cyanosis	  Neurologic: Non-focal  Extremities: Normal range of motion, No clubbing, cyanosis or edema    	  LABS:	 	    CARDIAC MARKERS:                                11.1   5.63  )-----------( 171      ( 2018 05:55 )             36.4         138  |  101  |  11  ----------------------------<  102<H>  3.4<L>   |  26  |  0.46<L>    Ca    8.6      2018 05:55    TPro  8.0  /  Alb  4.0  /  TBili  0.4  /  DBili  x   /  AST  20  /  ALT  11  /  AlkPhos  53      proBNP:   Lipid Profile:   HgA1c: Hemoglobin A1C, Whole Blood: 4.8 % ( @ 05:55)    TSH: Thyroid Stimulating Hormone, Serum: 2.38 uIU/mL ( @ 05:55)

## 2018-04-04 NOTE — PROGRESS NOTE ADULT - ASSESSMENT
77-year-old female with hypothyroidism, COPD, GERD, psoriatic arthritis, osteoarthritis, SUZIE on CPAP, neuropathy, HLD, anxiety p/w worsening right knee pain.      Problem/Plan - 1:  ·  Problem: Right knee pain.  Plan: pain control PRN, PT eval  outpatient f/u with orthopedic surgeon  no signs of infection, afebrile, no warmth/erythema of knees, no leukocytosis.   d/c planning     Problem/Plan - 2:  ·  Problem: Lower back pain.  Plan: c/w topicals, acetaminophen PRN  c/w sulfasalazine.      Problem/Plan - 3:  ·  Problem: Overactive bladder.  Plan: c/w vesicare.      Problem/Plan - 4:  ·  Problem: Psoriatic arthritis.  Plan: c/w sulfasalazine, pain mgmt.      Problem/Plan - 5:  ·  Problem: Chronic bronchitis.  Plan: c/w advair.      Problem/Plan - 6:  Problem: Hypothyroid. Plan: c/w levothyroxine   TSH with AM labs.     Problem/Plan - 7:  ·  Problem: Intertrigo.  Plan: nystatin, skin care.      Problem/Plan - 8:  ·  Problem: SUZIE on CPAP.  Plan: c/w CPAP QHS.      Problem/Plan - 9:  ·  Problem: Morbid obesity.  Plan: nutrition consult.      Problem/Plan - 10:  Problem: Need for prophylactic measure. Plan; Lovenox for DVT ppx.    Cough - CXR neg, c/w Flonase

## 2018-04-05 LAB
BUN SERPL-MCNC: 11 MG/DL — SIGNIFICANT CHANGE UP (ref 7–23)
CALCIUM SERPL-MCNC: 8.9 MG/DL — SIGNIFICANT CHANGE UP (ref 8.4–10.5)
CHLORIDE SERPL-SCNC: 100 MMOL/L — SIGNIFICANT CHANGE UP (ref 98–107)
CO2 SERPL-SCNC: 25 MMOL/L — SIGNIFICANT CHANGE UP (ref 22–31)
CREAT SERPL-MCNC: 0.55 MG/DL — SIGNIFICANT CHANGE UP (ref 0.5–1.3)
GLUCOSE SERPL-MCNC: 119 MG/DL — HIGH (ref 70–99)
HCT VFR BLD CALC: 39.3 % — SIGNIFICANT CHANGE UP (ref 34.5–45)
HGB BLD-MCNC: 12.1 G/DL — SIGNIFICANT CHANGE UP (ref 11.5–15.5)
MCHC RBC-ENTMCNC: 30.3 PG — SIGNIFICANT CHANGE UP (ref 27–34)
MCHC RBC-ENTMCNC: 30.8 % — LOW (ref 32–36)
MCV RBC AUTO: 98.3 FL — SIGNIFICANT CHANGE UP (ref 80–100)
NRBC # FLD: 0 — SIGNIFICANT CHANGE UP
PLATELET # BLD AUTO: 167 K/UL — SIGNIFICANT CHANGE UP (ref 150–400)
PMV BLD: 11 FL — SIGNIFICANT CHANGE UP (ref 7–13)
POTASSIUM SERPL-MCNC: 3.9 MMOL/L — SIGNIFICANT CHANGE UP (ref 3.5–5.3)
POTASSIUM SERPL-SCNC: 3.9 MMOL/L — SIGNIFICANT CHANGE UP (ref 3.5–5.3)
RBC # BLD: 4 M/UL — SIGNIFICANT CHANGE UP (ref 3.8–5.2)
RBC # FLD: 14.5 % — SIGNIFICANT CHANGE UP (ref 10.3–14.5)
SODIUM SERPL-SCNC: 138 MMOL/L — SIGNIFICANT CHANGE UP (ref 135–145)
WBC # BLD: 7.44 K/UL — SIGNIFICANT CHANGE UP (ref 3.8–10.5)
WBC # FLD AUTO: 7.44 K/UL — SIGNIFICANT CHANGE UP (ref 3.8–10.5)

## 2018-04-05 RX ADMIN — Medication 100 MILLIGRAM(S): at 06:50

## 2018-04-05 RX ADMIN — OXYCODONE HYDROCHLORIDE 5 MILLIGRAM(S): 5 TABLET ORAL at 22:05

## 2018-04-05 RX ADMIN — Medication 2 SPRAY(S): at 10:19

## 2018-04-05 RX ADMIN — Medication 50 MICROGRAM(S): at 06:27

## 2018-04-05 RX ADMIN — Medication 100 MILLIGRAM(S): at 21:34

## 2018-04-05 RX ADMIN — Medication 1000 MILLIGRAM(S): at 17:32

## 2018-04-05 RX ADMIN — OXYCODONE HYDROCHLORIDE 5 MILLIGRAM(S): 5 TABLET ORAL at 21:35

## 2018-04-05 RX ADMIN — Medication 1 APPLICATION(S): at 17:34

## 2018-04-05 RX ADMIN — GABAPENTIN 600 MILLIGRAM(S): 400 CAPSULE ORAL at 15:07

## 2018-04-05 RX ADMIN — Medication 5 MILLIGRAM(S): at 06:25

## 2018-04-05 RX ADMIN — GABAPENTIN 600 MILLIGRAM(S): 400 CAPSULE ORAL at 06:27

## 2018-04-05 RX ADMIN — NYSTATIN CREAM 1 APPLICATION(S): 100000 CREAM TOPICAL at 17:34

## 2018-04-05 RX ADMIN — Medication 1000 MILLIGRAM(S): at 06:25

## 2018-04-05 RX ADMIN — Medication 1 APPLICATION(S): at 06:25

## 2018-04-05 RX ADMIN — OXYCODONE HYDROCHLORIDE 5 MILLIGRAM(S): 5 TABLET ORAL at 11:45

## 2018-04-05 RX ADMIN — LORATADINE 10 MILLIGRAM(S): 10 TABLET ORAL at 10:18

## 2018-04-05 RX ADMIN — OXYCODONE HYDROCHLORIDE 5 MILLIGRAM(S): 5 TABLET ORAL at 10:17

## 2018-04-05 RX ADMIN — Medication 5 MILLIGRAM(S): at 17:31

## 2018-04-05 RX ADMIN — FAMOTIDINE 20 MILLIGRAM(S): 10 INJECTION INTRAVENOUS at 06:27

## 2018-04-05 RX ADMIN — Medication 1 TABLET(S): at 10:19

## 2018-04-05 RX ADMIN — NYSTATIN CREAM 1 APPLICATION(S): 100000 CREAM TOPICAL at 06:28

## 2018-04-05 RX ADMIN — ATORVASTATIN CALCIUM 80 MILLIGRAM(S): 80 TABLET, FILM COATED ORAL at 21:34

## 2018-04-05 RX ADMIN — Medication 500 MILLIGRAM(S): at 17:31

## 2018-04-05 RX ADMIN — Medication 81 MILLIGRAM(S): at 10:19

## 2018-04-05 RX ADMIN — ENOXAPARIN SODIUM 40 MILLIGRAM(S): 100 INJECTION SUBCUTANEOUS at 21:34

## 2018-04-05 RX ADMIN — MONTELUKAST 10 MILLIGRAM(S): 4 TABLET, CHEWABLE ORAL at 10:19

## 2018-04-05 RX ADMIN — GABAPENTIN 600 MILLIGRAM(S): 400 CAPSULE ORAL at 21:34

## 2018-04-05 RX ADMIN — BUDESONIDE AND FORMOTEROL FUMARATE DIHYDRATE 2 PUFF(S): 160; 4.5 AEROSOL RESPIRATORY (INHALATION) at 09:58

## 2018-04-05 RX ADMIN — Medication 500 MILLIGRAM(S): at 06:26

## 2018-04-05 RX ADMIN — FAMOTIDINE 20 MILLIGRAM(S): 10 INJECTION INTRAVENOUS at 17:31

## 2018-04-05 RX ADMIN — BUDESONIDE AND FORMOTEROL FUMARATE DIHYDRATE 2 PUFF(S): 160; 4.5 AEROSOL RESPIRATORY (INHALATION) at 21:34

## 2018-04-05 NOTE — PROGRESS NOTE ADULT - ASSESSMENT
77-year-old female with hypothyroidism, COPD, GERD, psoriatic arthritis, osteoarthritis, SUZIE on CPAP, neuropathy, HLD, anxiety p/w worsening right knee pain.      Problem/Plan - 1:  ·  Problem: Right knee pain.  Plan: pain control PRN, PT eval  outpatient f/u with orthopedic surgeon  no signs of infection, afebrile, no warmth/erythema of knees, no leukocytosis.   d/c planning     Problem/Plan - 2:  ·  Problem: Lower back pain.  Plan: c/w topicals, acetaminophen PRN  c/w sulfasalazine.      Problem/Plan - 3:  ·  Problem: Overactive bladder.  Plan: c/w vesicare.      Problem/Plan - 4:  ·  Problem: Psoriatic arthritis.  Plan: c/w sulfasalazine, pain mgmt.      Problem/Plan - 5:  ·  Problem: Chronic bronchitis.  Plan: c/w advair, Robitussin, Flonase PRN, CXR neg     Problem/Plan - 6:  Problem: Hypothyroid. Plan: c/w levothyroxine   TSH with AM labs.     Problem/Plan - 7:  ·  Problem: Intertrigo.  Plan: nystatin, skin care.      Problem/Plan - 8:  ·  Problem: SUZIE on CPAP.  Plan: c/w CPAP QHS.      Problem/Plan - 9:  ·  Problem: Morbid obesity.  Plan: nutrition consult.      Problem/Plan - 10:  Problem: Need for prophylactic measure. Plan; Lovenox for DVT ppx.

## 2018-04-05 NOTE — PROGRESS NOTE ADULT - SUBJECTIVE AND OBJECTIVE BOX
CHIEF COMPLAINT:Patient is a 77y old  Female who presents with a chief complaint of right knee pain (2018 20:17)      Allergies:  erythromycin (Rash)  latex (Rash)  Milk (Rash)  phenobarbital (Rash)      PAST MEDICAL & SURGICAL HISTORY:  Squamous cell carcinoma: LLE s/p Moh&#x27;s excision  Basal cell carcinoma: LLE s/p Moh&#x27;s excision  Overactive bladder  Osteoarthritis  Psoriatic arthritis  Chronic bronchitis  Hypothyroid  H/O:   H/O total shoulder replacement, left  History of carpal tunnel release: L wrist      FAMILY HISTORY:  Family history of hypertension in mother (Mother)  Family history of CHF (congestive heart failure) (Father): Father,  49y/o      REVIEW OF SYSTEMS:  CONSTITUTIONAL: No fever, weight loss, or fatigue  EYES: No eye pain, visual disturbances, or discharge  NECK: No pain or stiffness  RESPIRATORY: No cough or wheezing, no shortness of breath  CARDIOVASCULAR: No chest pain, palpitations, dizziness, or leg swelling  GASTROINTESTINAL: No abdominal or epigastric pain. No nausea, vomiting, diarrhea or constipation  GENITOURINARY: No dysuria, urinary frequency or urgency, no hematuria  NEUROLOGICAL: No headaches, memory loss, loss of strength, numbness, or tremors  SKIN: No itching, burning, rashes, or lesions   MUSCULOSKELETAL: + R knee pain      Medications:  MEDICATIONS  (STANDING):  ascorbic acid 500 milliGRAM(s) Oral two times a day  aspirin enteric coated 81 milliGRAM(s) Oral daily  atorvastatin 80 milliGRAM(s) Oral at bedtime  buDESOnide 160 MICROgram(s)/formoterol 4.5 MICROgram(s) Inhaler 2 Puff(s) Inhalation two times a day  calcium carbonate 1250 mG + Vitamin D (OsCal 500 + D) 1 Tablet(s) Oral daily  capsaicin HP 0.075% Cream 1 Application(s) Topical two times a day  enoxaparin Injectable 40 milliGRAM(s) SubCutaneous every 24 hours  famotidine    Tablet 20 milliGRAM(s) Oral two times a day  fluticasone propionate 50 MICROgram(s)/spray Nasal Spray 2 Spray(s) Both Nostrils daily  gabapentin 600 milliGRAM(s) Oral three times a day  levothyroxine 50 MICROGram(s) Oral daily  loratadine 10 milliGRAM(s) Oral daily  montelukast 10 milliGRAM(s) Oral daily  nystatin Powder 1 Application(s) Topical two times a day  oxybutynin 5 milliGRAM(s) Oral two times a day  sulfaSALAzine 1000 milliGRAM(s) Oral two times a day    MEDICATIONS  (PRN):  acetaminophen   Tablet. 650 milliGRAM(s) Oral every 6 hours PRN Mild to Moderate Pain (1 - 6)  baclofen 10 milliGRAM(s) Oral three times a day PRN back pain/spasm  oxyCODONE    IR 5 milliGRAM(s) Oral every 6 hours PRN Severe Pain (7 - 10)    	    PHYSICAL EXAM:  T(C): 37.1 (18 @ 06:19), Max: 37.4 (18 @ 21:16)  HR: 78 (18 @ 10:58) (72 - 94)  BP: 134/58 (18 @ 06:19) (121/57 - 167/64)  RR: 16 (18 @ 06:19) (16 - 18)  SpO2: 98% (18 @ 10:58) (93% - 98%)  Wt(kg): --  I&O's Summary    Appearance: Normal	  HEENT:   NCAT, PERRL, EOMI	  Lymphatic: No lymphadenopathy  Cardiovascular: Normal S1 S2, RRR  Respiratory: Lungs clear to auscultation BL  Psychiatry: A & O x 3, Mood & affect appropriate  Gastrointestinal:  Soft, Non-tender, + BS  Skin: No rashes, No ecchymoses, No cyanosis	  Neurologic: Non-focal  Extremities: Normal range of motion, No clubbing, cyanosis or edema    LABS:	 	    CARDIAC MARKERS:                                12.1   7.44  )-----------( 167      ( 2018 06:22 )             39.3     04-    138  |  100  |  11  ----------------------------<  119<H>  3.9   |  25  |  0.55    Ca    8.9      2018 06:22    TPro  8.0  /  Alb  4.0  /  TBili  0.4  /  DBili  x   /  AST  20  /  ALT  11  /  AlkPhos  53  04-03    proBNP:   Lipid Profile:   HgA1c:   TSH:

## 2018-04-05 NOTE — DIETITIAN INITIAL EVALUATION ADULT. - OTHER INFO
Received nutrition consult for education as Pt with morbid obesity. Reports good appetite and po intake and denies nausea/vomiting/diarrhea/constipation or any issues with chewing/swallowing. Noted food allergy to milk. Pt reports to recent weight gain, likely related to inactivity. Pt states importance of weight loss and its effects on knee pain. Encouraged, gradual, long term weight loss with portion controlled meals.

## 2018-04-05 NOTE — PROVIDER CONTACT NOTE (OTHER) - ASSESSMENT
Frequent dry cough noted, denies sputum production. Patient states her ears feel full but does not have any pain. Also complains of pressure to sinuses. Temperature 99.3 before bedtime, AM temp 98.8.

## 2018-04-06 ENCOUNTER — TRANSCRIPTION ENCOUNTER (OUTPATIENT)
Age: 78
End: 2018-04-06

## 2018-04-06 VITALS — OXYGEN SATURATION: 98 % | HEART RATE: 79 BPM

## 2018-04-06 RX ORDER — KETOCONAZOLE 20 MG/G
1 AEROSOL, FOAM TOPICAL
Qty: 0 | Refills: 0 | COMMUNITY

## 2018-04-06 RX ORDER — LORATADINE 10 MG/1
1 TABLET ORAL
Qty: 0 | Refills: 0 | DISCHARGE
Start: 2018-04-06

## 2018-04-06 RX ORDER — DICLOFENAC SODIUM 30 MG/G
1 GEL TOPICAL
Qty: 0 | Refills: 0 | COMMUNITY

## 2018-04-06 RX ORDER — CAPSAICIN 0.025 %
1 CREAM (GRAM) TOPICAL
Qty: 0 | Refills: 0 | DISCHARGE
Start: 2018-04-06

## 2018-04-06 RX ORDER — HYDROCORTISONE 1 %
1 OINTMENT (GRAM) TOPICAL
Qty: 0 | Refills: 0 | COMMUNITY

## 2018-04-06 RX ORDER — NYSTATIN CREAM 100000 [USP'U]/G
1 CREAM TOPICAL
Qty: 0 | Refills: 0 | DISCHARGE
Start: 2018-04-06

## 2018-04-06 RX ORDER — CETIRIZINE HYDROCHLORIDE 10 MG/1
1 TABLET ORAL
Qty: 0 | Refills: 0 | COMMUNITY

## 2018-04-06 RX ADMIN — NYSTATIN CREAM 1 APPLICATION(S): 100000 CREAM TOPICAL at 17:12

## 2018-04-06 RX ADMIN — Medication 5 MILLIGRAM(S): at 06:09

## 2018-04-06 RX ADMIN — Medication 200 MILLIGRAM(S): at 10:28

## 2018-04-06 RX ADMIN — Medication 1 APPLICATION(S): at 17:14

## 2018-04-06 RX ADMIN — Medication 500 MILLIGRAM(S): at 17:14

## 2018-04-06 RX ADMIN — Medication 1 APPLICATION(S): at 06:09

## 2018-04-06 RX ADMIN — Medication 50 MICROGRAM(S): at 06:09

## 2018-04-06 RX ADMIN — LORATADINE 10 MILLIGRAM(S): 10 TABLET ORAL at 12:35

## 2018-04-06 RX ADMIN — MONTELUKAST 10 MILLIGRAM(S): 4 TABLET, CHEWABLE ORAL at 12:35

## 2018-04-06 RX ADMIN — FAMOTIDINE 20 MILLIGRAM(S): 10 INJECTION INTRAVENOUS at 17:14

## 2018-04-06 RX ADMIN — Medication 5 MILLIGRAM(S): at 17:14

## 2018-04-06 RX ADMIN — OXYCODONE HYDROCHLORIDE 5 MILLIGRAM(S): 5 TABLET ORAL at 11:00

## 2018-04-06 RX ADMIN — GABAPENTIN 600 MILLIGRAM(S): 400 CAPSULE ORAL at 06:09

## 2018-04-06 RX ADMIN — Medication 1000 MILLIGRAM(S): at 06:09

## 2018-04-06 RX ADMIN — FAMOTIDINE 20 MILLIGRAM(S): 10 INJECTION INTRAVENOUS at 06:10

## 2018-04-06 RX ADMIN — Medication 1000 MILLIGRAM(S): at 17:14

## 2018-04-06 RX ADMIN — Medication 2 SPRAY(S): at 12:36

## 2018-04-06 RX ADMIN — Medication 500 MILLIGRAM(S): at 06:09

## 2018-04-06 RX ADMIN — GABAPENTIN 600 MILLIGRAM(S): 400 CAPSULE ORAL at 12:36

## 2018-04-06 RX ADMIN — Medication 81 MILLIGRAM(S): at 12:35

## 2018-04-06 RX ADMIN — BUDESONIDE AND FORMOTEROL FUMARATE DIHYDRATE 2 PUFF(S): 160; 4.5 AEROSOL RESPIRATORY (INHALATION) at 10:28

## 2018-04-06 RX ADMIN — NYSTATIN CREAM 1 APPLICATION(S): 100000 CREAM TOPICAL at 06:09

## 2018-04-06 RX ADMIN — Medication 1 TABLET(S): at 12:35

## 2018-04-06 RX ADMIN — OXYCODONE HYDROCHLORIDE 5 MILLIGRAM(S): 5 TABLET ORAL at 10:29

## 2018-04-06 NOTE — DISCHARGE NOTE ADULT - PLAN OF CARE
Remains pain free Continue Rehab as recommended   please follow up  outpatient  with orthopedic surgeon Continue Rehab as recommended   please follow up outpatient  with orthopedic surgeon Continue with pain mgmt as prescribed. Continue with sulfasalazine. Dispo to rehab. Continue with vesicare. Follow up with medical staff at rehab for further management. Follow up with PCP within 1 week of discharge from rehab. Continue with sulfasalazine and pain mgmt. Follow up with medical staff at rehab. Follow up with PCP and your hematologist 1 week after discharge from rehab. Continue with advair, Robitussin, Flonase PRN. Continue with synthroid. Follow up with medical staff at rehab for further mgmt. Follow up with your PCP 1 week after discharge from rehab for further monitoring including TSH levels. Continue with nystatin. Follow up with medical staff at rehab for further mgmt.

## 2018-04-06 NOTE — DISCHARGE NOTE ADULT - ADDITIONAL INSTRUCTIONS
please follow up  with outpatient orthopedic surgeon for right knee pain Follow up with medical staff at rehab  Follow up with PCP within 1 week of discharge from rehab  Follow up with outpatient orthopedic surgeon for right knee pain after discharge from rehab (Dr. Aaron Martines)

## 2018-04-06 NOTE — PROGRESS NOTE ADULT - ASSESSMENT
77-year-old female with hypothyroidism, COPD, GERD, psoriatic arthritis, osteoarthritis, SUZIE on CPAP, neuropathy, HLD, anxiety p/w worsening right knee pain.      Problem/Plan - 1:  ·  Problem: Right knee pain.  Plan: pain control PRN, PT eval  outpatient f/u with orthopedic surgeon  no signs of infection, afebrile, no warmth/erythema of knees, no leukocytosis.   d/c planning to [facility]     Problem/Plan - 2:  ·  Problem: Lower back pain.  Plan: c/w topicals, acetaminophen PRN  c/w sulfasalazine.      Problem/Plan - 3:  ·  Problem: Overactive bladder.  Plan: c/w vesicare.      Problem/Plan - 4:  ·  Problem: Psoriatic arthritis.  Plan: c/w sulfasalazine, pain mgmt.      Problem/Plan - 5:  ·  Problem: Chronic bronchitis.  Plan: c/w advair, Robitussin, Flonase PRN, CXR neg     Problem/Plan - 6:  Problem: Hypothyroid. Plan: c/w levothyroxine   TSH with AM labs.     Problem/Plan - 7:  ·  Problem: Intertrigo.  Plan: nystatin, skin care.      Problem/Plan - 8:  ·  Problem: SUZIE on CPAP.  Plan: c/w CPAP QHS.      Problem/Plan - 9:  ·  Problem: Morbid obesity.  Plan: nutrition consult.      Problem/Plan - 10:  Problem: Need for prophylactic measure. Plan; Lovenox for DVT ppx.

## 2018-04-06 NOTE — PROGRESS NOTE ADULT - SUBJECTIVE AND OBJECTIVE BOX
CHIEF COMPLAINT:Patient is a 77y old  Female who presents with a chief complaint of right knee pain (2018 20:17)      Allergies:  erythromycin (Rash)  latex (Rash)  Milk (Rash)  phenobarbital (Rash)      PAST MEDICAL & SURGICAL HISTORY:  Squamous cell carcinoma: LLE s/p Moh&#x27;s excision  Basal cell carcinoma: LLE s/p Moh&#x27;s excision  Overactive bladder  Osteoarthritis  Psoriatic arthritis  Chronic bronchitis  Hypothyroid  H/O:   H/O total shoulder replacement, left  History of carpal tunnel release: L wrist      FAMILY HISTORY:  Family history of hypertension in mother (Mother)  Family history of CHF (congestive heart failure) (Father): Father,  47y/o      REVIEW OF SYSTEMS:  CONSTITUTIONAL: No fever, weight loss, or fatigue  EYES: No eye pain, visual disturbances, or discharge  NECK: No pain or stiffness  RESPIRATORY: No cough or wheezing, no shortness of breath  CARDIOVASCULAR: No chest pain, palpitations, dizziness, or leg swelling  GASTROINTESTINAL: No abdominal or epigastric pain. No nausea, vomiting, diarrhea or constipation  GENITOURINARY: No dysuria, urinary frequency or urgency, no hematuria  NEUROLOGICAL: No headaches, memory loss, loss of strength, numbness, or tremors  SKIN: No itching, burning, rashes, or lesions   MUSCULOSKELETAL: + R knee pain improving      Medications:  MEDICATIONS  (STANDING):  ascorbic acid 500 milliGRAM(s) Oral two times a day  aspirin enteric coated 81 milliGRAM(s) Oral daily  atorvastatin 80 milliGRAM(s) Oral at bedtime  buDESOnide 160 MICROgram(s)/formoterol 4.5 MICROgram(s) Inhaler 2 Puff(s) Inhalation two times a day  calcium carbonate 1250 mG + Vitamin D (OsCal 500 + D) 1 Tablet(s) Oral daily  capsaicin HP 0.075% Cream 1 Application(s) Topical two times a day  enoxaparin Injectable 40 milliGRAM(s) SubCutaneous every 24 hours  famotidine    Tablet 20 milliGRAM(s) Oral two times a day  fluticasone propionate 50 MICROgram(s)/spray Nasal Spray 2 Spray(s) Both Nostrils daily  gabapentin 600 milliGRAM(s) Oral three times a day  levothyroxine 50 MICROGram(s) Oral daily  loratadine 10 milliGRAM(s) Oral daily  montelukast 10 milliGRAM(s) Oral daily  nystatin Powder 1 Application(s) Topical two times a day  oxybutynin 5 milliGRAM(s) Oral two times a day  sulfaSALAzine 1000 milliGRAM(s) Oral two times a day    MEDICATIONS  (PRN):  acetaminophen   Tablet. 650 milliGRAM(s) Oral every 6 hours PRN Mild to Moderate Pain (1 - 6)  baclofen 10 milliGRAM(s) Oral three times a day PRN back pain/spasm  oxyCODONE    IR 5 milliGRAM(s) Oral every 6 hours PRN Severe Pain (7 - 10)    	    PHYSICAL EXAM:  T(C): 37.1 (18 @ 05:36), Max: 37.6 (18 @ 20:14)  HR: 77 (18 @ 10:27) (64 - 82)  BP: 131/57 (18 @ 05:36) (131/57 - 141/55)  RR: 16 (18 @ 05:36) (16 - 18)  SpO2: 98% (18 @ 10:27) (96% - 98%)  Wt(kg): --  I&O's Summary      Appearance: Normal	  HEENT:   NCAT, PERRL, EOMI	  Lymphatic: No lymphadenopathy  Cardiovascular: Normal S1 S2, RRR  Respiratory: Lungs clear to auscultation BL  Psychiatry: A & O x 3, Mood & affect appropriate  Gastrointestinal:  Soft, Non-tender, + BS  Skin: No rashes, No ecchymoses, No cyanosis	  Neurologic: Non-focal  Extremities: Normal range of motion, No clubbing, cyanosis or edema    LABS:	 	    CARDIAC MARKERS:                                12.1   7.44  )-----------( 167      ( 2018 06:22 )             39.3     04-    138  |  100  |  11  ----------------------------<  119<H>  3.9   |  25  |  0.55    Ca    8.9      2018 06:22      proBNP:   Lipid Profile:   HgA1c:   TSH:

## 2018-04-06 NOTE — DISCHARGE NOTE ADULT - CARE PLAN
Principal Discharge DX:	Right knee pain  Goal:	Remains pain free  Assessment and plan of treatment:	Continue Rehab as recommended   please follow up  outpatient  with orthopedic surgeon Principal Discharge DX:	Right knee pain  Goal:	Remains pain free  Assessment and plan of treatment:	Continue Rehab as recommended   please follow up outpatient  with orthopedic surgeon  Secondary Diagnosis:	Lower back pain  Assessment and plan of treatment:	Continue with pain mgmt as prescribed. Continue with sulfasalazine. Dispo to rehab.  Secondary Diagnosis:	Overactive bladder  Assessment and plan of treatment:	Continue with vesicare. Follow up with medical staff at rehab for further management. Follow up with PCP within 1 week of discharge from rehab.  Secondary Diagnosis:	Psoriatic arthritis  Assessment and plan of treatment:	Continue with sulfasalazine and pain mgmt. Follow up with medical staff at rehab. Follow up with PCP and your hematologist 1 week after discharge from rehab.  Secondary Diagnosis:	Chronic bronchitis  Assessment and plan of treatment:	Continue with advair, Robitussin, Flonase PRN.  Secondary Diagnosis:	Hypothyroid  Assessment and plan of treatment:	Continue with synthroid. Follow up with medical staff at rehab for further mgmt. Follow up with your PCP 1 week after discharge from rehab for further monitoring including TSH levels.  Secondary Diagnosis:	Intertrigo  Assessment and plan of treatment:	Continue with nystatin. Follow up with medical staff at rehab for further mgmt.

## 2018-04-06 NOTE — DISCHARGE NOTE ADULT - PATIENT PORTAL LINK FT
You can access the [facility], offered by [facility], by registering with the following website: [email or web address]

## 2018-04-06 NOTE — DISCHARGE NOTE ADULT - PROVIDER TOKENS
FREE:[LAST:[Dr. Precious Sibley],PHONE:[phone]],FAX:[(   )    -]],FREE:[LAST:[Dr. Avinash Eldridge],PHONE:[phone]:[(   )    -]],FREE:[LAST:[Orthopedist - Dr. Aaron Martines],PHONE:[(   )    -],FAX:[(   )    -]]

## 2018-04-06 NOTE — DISCHARGE NOTE ADULT - CARE PROVIDER_API CALL
Dr. Precious Sibley,   Phone: [phone]  Fax: (   )    -    Dr. Avinash Eldridge,   Phone: [phone]  Fax: (   )    -    Orthopedist - Dr. Aaron Martines,   Phone: (   )    -  Fax: (   )    -

## 2018-04-06 NOTE — DISCHARGE NOTE ADULT - MEDICATION SUMMARY - MEDICATIONS TO TAKE
I will START or STAY ON the medications listed below when I get home from the hospital:    sulfaSALAzine 500 mg oral tablet  -- 2 tab(s) by mouth 2 times a day  -- Indication: For Psoriatic arthritis    Tylenol 325 mg oral capsule  -- 2 cap(s) by mouth 2 times a day  -- Indication: For Pain    Aspirin Enteric Coated 81 mg oral delayed release tablet  -- 1 tab(s) by mouth once a day  -- Indication: For CAD    ibuprofen 800 mg oral tablet  -- 1 tab(s) by mouth 3 times a day, As Needed  -- Indication: For Pain    gabapentin 600 mg oral tablet  -- 1 tab(s) by mouth 3 times a day  -- Indication: For Neuropathic pain    loratadine 10 mg oral tablet  -- 1 tab(s) by mouth once a day  -- Indication: For Chronic bronchitis / allergies    Crestor 20 mg oral tablet  -- 1 tab(s) by mouth once a day (at bedtime)  -- Indication: For HLD    alendronate 70 mg oral tablet  -- 1 tab(s) by mouth once a week on Sundays  -- Indication: For Osteoporosis    Advair Diskus 250 mcg-50 mcg inhalation powder  -- 1 puff(s) inhaled 2 times a day  -- Indication: For Chronic bronchitis    nystatin 100,000 units/g topical powder  -- 1 application on skin 2 times a day  -- Indication: For Intertrigo    capsaicin 0.075% topical cream  -- 1 application on skin 2 times a day  -- Indication: For Topical pain releaver    famotidine 20 mg oral tablet  -- 1 tab(s) by mouth 3 times a day  -- Indication: For GERD    Singulair 10 mg oral tablet  -- 1 tab(s) by mouth once a day  -- Indication: For Chronic bronchitis / allergies    baclofen 10 mg oral tablet  -- 1 tab(s) by mouth 3 times a day, As Needed  -- Indication: For Lower back pain    Flonase 50 mcg/inh nasal spray  -- 2 spray(s) into nose once a day  -- Indication: For Chronic bronchitis    levothyroxine 50 mcg (0.05 mg) oral tablet  -- 1 tab(s) by mouth once a day  -- Indication: For Hypothyroid    VESIcare 10 mg oral tablet  -- 1 tab(s) by mouth once a day  -- Indication: For Urinary incontinence    Calcium 500+D oral tablet, chewable  -- 1 tab(s) by mouth 2 times a day  -- Indication: For Supplement    Vitamin C 500 mg oral tablet  -- 1 tab(s) by mouth 2 times a day  -- Indication: For Supplement

## 2018-04-06 NOTE — DISCHARGE NOTE ADULT - HOSPITAL COURSE
77-year-old female with hypothyroidism, COPD, GERD, psoriatic arthritis, osteoarthritis, SUZIE on CPAP, neuropathy, HLD, anxiety p/w worsening right knee pain. Pt lives in an assisted living facility. She has been receiving intermittent gel injections in her knees, as well as steroid injections in her lower back last injection was 3/01/18 by her orthopedic surgeon.  She reports worsening right knee pain for the past 2 weeks        Right knee pain.   pain control PRN,   PT eval----rehab  outpatient f/u with orthopedic surgeon  no signs of infection, afebrile, no warmth/erythema of knees, no leukocytosis.        Lower back pain.     c/w topicals, acetaminophen PRN  c/w sulfasalazine.     Overactive bladder.    -c/w vesicare.        Psoriatic arthritis.    - c/w sulfasalazine, pain mgmt.       Chronic bronchitis.    : c/w advair,   Robitussin, Flonase PRN,   CXR neg      Hypothyroid. Plan: c/w levothyroxine   TSH -2.38 done on 4/4/18 .       Intertrigo.  : nystatin, skin care.       SUZIE on CPAP. c/w CPAP QHS.      Morbid obesity.    nutrition consulted. - DASH diet 77-year-old female with hypothyroidism, COPD, GERD, psoriatic arthritis, osteoarthritis, SUZIE on CPAP, neuropathy, HLD, anxiety p/w worsening right knee pain. Pt lives in an assisted living facility. She has been receiving intermittent gel injections in her knees, as well as steroid injections in her lower back last injection was 3/01/18 by her orthopedic surgeon.  She reports worsening right knee pain for the past 2 weeks      Right knee pain.     -Pain control PRN,   -PT eval: recommended rehab  -Outpatient f/u with orthopedic surgeon  -No signs of infection, afebrile, no warmth/erythema of knees, no leukocytosis.       Lower back pain.    -Continued with topicals and acetaminophen PRN  -Continued with sulfasalazine.     Overactive bladder.    -Continued with vesicare.     Psoriatic arthritis.    -Continued with sulfasalazine, pain mgmt.     Chronic bronchitis.    -Continued with advair,  -Robitussin, Flonase PRN,   -CXR neg      Hypothyroid.  -Continued with home dose levothyroxine   -4/4/18: TSH -2.38     Intertrigo.  -Nystatin    SUZIE on CPAP.   -Continued with CPAP QHS.     Morbid obesity.    -Nutrition consulted: recommended DASH diet     Dispo: Rehab

## 2018-04-06 NOTE — DISCHARGE NOTE ADULT - MEDICATION SUMMARY - MEDICATIONS TO STOP TAKING
I will STOP taking the medications listed below when I get home from the hospital:    hydrocortisone 2.5% topical cream  -- Apply on skin to affected area 2 times a day    Icy Hot Advanced Relief 7.5% topical film  -- Apply on skin to affected area once a day    ketoconazole 2% topical cream  -- Apply on skin to affected area once a day    Voltaren 1% topical gel  -- Apply on skin to affected area once a day

## 2021-07-15 NOTE — DISCHARGE NOTE ADULT - PRINCIPAL DIAGNOSIS
Right knee pain Paramedian Forehead Flap Text: A decision was made to reconstruct the defect utilizing an interpolation axial flap and a staged reconstruction.  A telfa template was made of the defect.  This telfa template was then used to outline the paramedian forehead pedicle flap.  The donor area for the pedicle flap was then injected with anesthesia.  The flap was excised through the skin and subcutaneous tissue down to the layer of the underlying musculature.  The pedicle flap was carefully excised within this deep plane to maintain its blood supply.  The edges of the donor site were undermined.   The donor site was closed in a primary fashion.  The pedicle was then rotated into position and sutured.  Once the tube was sutured into place, adequate blood supply was confirmed with blanching and refill.  The pedicle was then wrapped with xeroform gauze and dressed appropriately with a telfa and gauze bandage to ensure continued blood supply and protect the attached pedicle.

## 2022-02-28 NOTE — ED ADULT NURSE NOTE - ASSIST WITH
toileting/standing/walking
no blurred vision/no change in level of consciousness/no dizziness/no fever/no loss of consciousness/no numbness/no vomiting

## 2022-03-26 PROBLEM — J42 UNSPECIFIED CHRONIC BRONCHITIS: Chronic | Status: ACTIVE | Noted: 2018-04-03

## 2022-03-26 PROBLEM — C44.92 SQUAMOUS CELL CARCINOMA OF SKIN, UNSPECIFIED: Chronic | Status: ACTIVE | Noted: 2018-04-03

## 2022-03-26 PROBLEM — L40.50 ARTHROPATHIC PSORIASIS, UNSPECIFIED: Chronic | Status: ACTIVE | Noted: 2018-04-03

## 2022-03-26 PROBLEM — C44.91 BASAL CELL CARCINOMA OF SKIN, UNSPECIFIED: Chronic | Status: ACTIVE | Noted: 2018-04-03

## 2022-03-26 PROBLEM — N32.81 OVERACTIVE BLADDER: Chronic | Status: ACTIVE | Noted: 2018-04-03

## 2022-03-26 PROBLEM — E03.9 HYPOTHYROIDISM, UNSPECIFIED: Chronic | Status: ACTIVE | Noted: 2018-04-03

## 2022-03-26 PROBLEM — M19.90 UNSPECIFIED OSTEOARTHRITIS, UNSPECIFIED SITE: Chronic | Status: ACTIVE | Noted: 2018-04-03

## 2022-04-12 ENCOUNTER — APPOINTMENT (OUTPATIENT)
Dept: ORTHOPEDIC SURGERY | Facility: CLINIC | Age: 82
End: 2022-04-12

## 2022-04-26 ENCOUNTER — RX CHANGE (OUTPATIENT)
Age: 82
End: 2022-04-26

## 2022-05-19 ENCOUNTER — APPOINTMENT (OUTPATIENT)
Dept: ORTHOPEDIC SURGERY | Facility: CLINIC | Age: 82
End: 2022-05-19
Payer: MEDICARE

## 2022-05-19 VITALS — WEIGHT: 293 LBS | BODY MASS INDEX: 61.5 KG/M2 | HEIGHT: 58 IN

## 2022-05-19 DIAGNOSIS — Z87.2 PERSONAL HISTORY OF DISEASES OF THE SKIN AND SUBCUTANEOUS TISSUE: ICD-10-CM

## 2022-05-19 PROCEDURE — 73565 X-RAY EXAM OF KNEES: CPT

## 2022-05-19 PROCEDURE — 20610 DRAIN/INJ JOINT/BURSA W/O US: CPT | Mod: 50

## 2022-05-19 PROCEDURE — 73560 X-RAY EXAM OF KNEE 1 OR 2: CPT | Mod: 50

## 2022-05-19 PROCEDURE — 99214 OFFICE O/P EST MOD 30 MIN: CPT | Mod: 25

## 2022-05-19 RX ORDER — SULFADIAZINE 500 MG/1
TABLET ORAL
Refills: 0 | Status: ACTIVE | COMMUNITY

## 2022-05-19 NOTE — PHYSICAL EXAM
[Right] : right knee [5___] : hamstring 5[unfilled]/5 [Left] : left knee [NL (0)] : extension 0 degrees [4___] : hamstring 4[unfilled]/5 [Bilateral] : knee bilaterally [Lateral] : lateral [[place]] : skyline [AP Standing] : anteroposterior standing [Moderate tricompartmental OA medial narrowing] : Moderate tricompartmental OA medial narrowing [Advanced patellofemoral OA] : Advanced patellofemoral OA [de-identified] : extension 10 degrees [de-identified] : left lateral bending 5 degrees [de-identified] : right lateral bending 5 degrees [TWNoteComboBox6] : internal rotation L4 [de-identified] : external rotation 70 degrees [] : no erythema [TWNoteComboBox7] : flexion 110 degrees

## 2022-05-19 NOTE — DISCUSSION/SUMMARY
[de-identified] : The risks, benefits, and alternatives to corticosteroid injection were reviewed with the patient. Risks outlined include, but are not limited to infection, sepsis, bleeding, scarring, skin discoloration, temporary increase in pain, syncopal episode, failure to resolve symptoms, symptoms recurrence, allergic reaction, flare reaction, and elevation of blood sugar in diabetics. Patient understood the risks and asked to proceed with this treatment course.\par \par Progress note completed by GRANT Slaughter.\par \par Physician Instructions:\par The documentation recorded by the PA accurately reflects the service I personally performed and decisions made by me.\par

## 2022-05-19 NOTE — PROCEDURE
[FreeTextEntry3] : Large Joint Injection was performed because of pain and inflammation. Anesthesia: ethyl chloride sprayed topically.. \par Decadron: An injection of Decadron 4 mg , \par Kenalog: An injection of Kenalog 5 mg , \par Lidocaine: 2 cc. \par \par Medication was injected in the bilateral knees. Patient has tried OTC's including aspirin, Ibuprofen, Aleve etc or prescription NSAIDS, and/or exercises at home and/ or physical therapy without satisfactory response and Patient has decreased mobility in the joint. After verbal consent using sterile preparation and technique. The risks, benefits, and alternatives to cortisone injection were explained in full to the patient. Risks outlined include but are not limited to infection, sepsis, bleeding, scarring, skin discoloration, temporary increase in pain, syncopal episode, failure to resolve symptoms, allergic reaction, symptom recurrence, and elevation of blood sugar in diabetics. Patient understood the risks. All questions were answered. After discussion of options, patient requested an injection. Oral informed consent was obtained and sterile prep was done of the injection site. Sterile technique was utilized for the procedure including the preparation of the solutions used for the injection. Patient tolerated the procedure well. Advised to ice the injection site this evening. Prep with betadine locally to site. Sterile technique used and Prep with alcohol locally to site. Sterile technique used. Patient tolerated procedure well. Post Procedure Instructions: Patient was advised to call if redness, pain, or fever occur and apply ice for 15 min. out of every hour for the next 12-24 hours as tolerated. patient was advised to rest the joint(s) for 1-2 days.\par \par

## 2022-05-19 NOTE — HISTORY OF PRESENT ILLNESS
[5] : 5 [3] : 3 [Dull/Aching] : dull/aching [Sharp] : sharp [Intermittent] : intermittent [Rest] : rest [] : yes [de-identified] : Follow-up today. Symptoms continue. She has been getting PT recently for her neck. Bilateral knee pain continues. She has had relief with CSI in the past (last was 9/13/21). [de-identified] : motion  [de-identified] : pt

## 2022-09-01 ENCOUNTER — APPOINTMENT (OUTPATIENT)
Dept: ORTHOPEDIC SURGERY | Facility: CLINIC | Age: 82
End: 2022-09-01

## 2022-09-01 VITALS — WEIGHT: 293 LBS | HEIGHT: 58 IN | BODY MASS INDEX: 61.5 KG/M2

## 2022-09-01 DIAGNOSIS — G56.01 CARPAL TUNNEL SYNDROME, RIGHT UPPER LIMB: ICD-10-CM

## 2022-09-01 PROCEDURE — 99213 OFFICE O/P EST LOW 20 MIN: CPT | Mod: 25

## 2022-09-01 PROCEDURE — J3490M: CUSTOM

## 2022-09-01 PROCEDURE — 20610 DRAIN/INJ JOINT/BURSA W/O US: CPT | Mod: RT

## 2022-09-01 NOTE — HISTORY OF PRESENT ILLNESS
[5] : 5 [3] : 3 [Dull/Aching] : dull/aching [Sharp] : sharp [Rest] : rest [de-identified] : Follow up today. Symptoms continue. She has been getting PT. [de-identified] : motion

## 2022-09-01 NOTE — REASON FOR VISIT
[FreeTextEntry2] : follow up bilateral knees/left shoulder/back. Symptoms continue. Has been continuing PT.

## 2022-09-01 NOTE — PROCEDURE
[FreeTextEntry3] : Large Joint Injection was performed in the right knee because of pain and inflammation. \par Decadron: An injection of Decadron 4 mg , \par Kenalog: An injection of Kenalog 5 mg , \par Marcaine .25%: 2 cc. \par \par Patient has tried OTC's including aspirin, Ibuprofen, Aleve etc or prescription NSAIDS, and/or exercises at home and/ or physical therapy without satisfactory response and Patient has decreased mobility in the joint. The risks, benefits, and alternatives to cortisone injection were explained in full to the patient. Risks outlined include but are not limited to infection, sepsis, bleeding, scarring, skin discoloration, temporary increase in pain, syncopal episode, failure to resolve symptoms, allergic reaction, symptom recurrence, and elevation of blood sugar in diabetics. Patient understood the risks. All questions were answered. After discussion of options, patient requested an injection. Oral informed consent was obtained. Sterile technique was utilized for the procedure including the preparation of the solutions used for the injection. Injection site was prepped with betadine and alcohol. Ethyl chloride sprayed topically. Sterile technique used. Patient tolerated procedure well. \par \par Post Procedure Instructions: Patient was advised to call if redness, pain, or fever occur. Apply ice for 15 min. every 2 hours for the next 12-24 hours as tolerated. Patient was advised to rest the joint(s) for 1-2 days.\par \par \par

## 2022-09-01 NOTE — PHYSICAL EXAM
[Right] : right knee [5___] : hamstring 5[unfilled]/5 [Left] : left knee [NL (0)] : extension 0 degrees [4___] : hamstring 4[unfilled]/5 [Bilateral] : knee bilaterally [Lateral] : lateral [[place]] : skyline [AP Standing] : anteroposterior standing [Moderate tricompartmental OA medial narrowing] : Moderate tricompartmental OA medial narrowing [Advanced patellofemoral OA] : Advanced patellofemoral OA [de-identified] : extension 10 degrees [de-identified] : left lateral bending 5 degrees [de-identified] : right lateral bending 5 degrees [TWNoteComboBox6] : internal rotation L4 [de-identified] : external rotation 70 degrees [] : no erythema [TWNoteComboBox7] : flexion 110 degrees

## 2022-11-29 RX ORDER — MELOXICAM 15 MG/1
15 TABLET ORAL DAILY
Qty: 30 | Refills: 2 | Status: ACTIVE | COMMUNITY
Start: 2022-04-25 | End: 1900-01-01

## 2022-12-12 RX ORDER — OMEPRAZOLE 20 MG/1
20 TABLET, ORALLY DISINTEGRATING, DELAYED RELEASE ORAL
Qty: 60 | Refills: 2 | Status: DISCONTINUED | COMMUNITY
Start: 2022-04-25 | End: 2022-12-12

## 2022-12-20 ENCOUNTER — APPOINTMENT (OUTPATIENT)
Dept: ORTHOPEDIC SURGERY | Facility: CLINIC | Age: 82
End: 2022-12-20
Payer: MEDICARE

## 2022-12-20 VITALS — HEIGHT: 58 IN | WEIGHT: 293 LBS | BODY MASS INDEX: 61.5 KG/M2

## 2022-12-20 DIAGNOSIS — Z96.612 PRESENCE OF LEFT ARTIFICIAL SHOULDER JOINT: ICD-10-CM

## 2022-12-20 DIAGNOSIS — M54.16 RADICULOPATHY, LUMBAR REGION: ICD-10-CM

## 2022-12-20 DIAGNOSIS — M17.11 UNILATERAL PRIMARY OSTEOARTHRITIS, RIGHT KNEE: ICD-10-CM

## 2022-12-20 DIAGNOSIS — M51.26 OTHER INTERVERTEBRAL DISC DISPLACEMENT, LUMBAR REGION: ICD-10-CM

## 2022-12-20 DIAGNOSIS — M51.9 UNSPECIFIED THORACIC, THORACOLUMBAR AND LUMBOSACRAL INTERVERTEBRAL DISC DISORDER: ICD-10-CM

## 2022-12-20 DIAGNOSIS — M48.061 SPINAL STENOSIS, LUMBAR REGION WITHOUT NEUROGENIC CLAUDICATION: ICD-10-CM

## 2022-12-20 DIAGNOSIS — M17.12 UNILATERAL PRIMARY OSTEOARTHRITIS, LEFT KNEE: ICD-10-CM

## 2022-12-20 PROCEDURE — 99214 OFFICE O/P EST MOD 30 MIN: CPT | Mod: 25

## 2022-12-20 PROCEDURE — 20610 DRAIN/INJ JOINT/BURSA W/O US: CPT | Mod: 50

## 2022-12-20 RX ORDER — OMEPRAZOLE 20 MG/1
20 CAPSULE, DELAYED RELEASE ORAL TWICE DAILY
Qty: 60 | Refills: 2 | Status: ACTIVE | COMMUNITY
Start: 2022-12-12 | End: 1900-01-01

## 2022-12-20 NOTE — PROCEDURE
[FreeTextEntry3] : Large Joint Injection was performed in the right knee because of pain and inflammation. \par Decadron: An injection of Decadron 4 mg , \par Kenalog: An injection of Kenalog 5 mg , \par Lidocaine: 2 cc. \par \par Patient has tried OTC's including aspirin, Ibuprofen, Aleve etc or prescription NSAIDS, and/or exercises at home and/ or physical therapy without satisfactory response and Patient has decreased mobility in the joint. The risks, benefits, and alternatives to cortisone injection were explained in full to the patient. Risks outlined include but are not limited to infection, sepsis, bleeding, scarring, skin discoloration, temporary increase in pain, syncopal episode, failure to resolve symptoms, allergic reaction, symptom recurrence, and elevation of blood sugar in diabetics. Patient understood the risks. All questions were answered. After discussion of options, patient requested an injection. Oral informed consent was obtained. Sterile technique was utilized for the procedure including the preparation of the solutions used for the injection. Injection site was prepped with betadine and alcohol. Ethyl chloride sprayed topically. Sterile technique used. Patient tolerated procedure well. \par \par Post Procedure Instructions: Patient was advised to call if redness, pain, or fever occur. Apply ice for 15 min. every 2 hours for the next 12-24 hours as tolerated. Patient was advised to rest the joint(s) for 1-2 days.\par \par \par Large Joint Injection was performed in the left knee because of pain and inflammation. \par Decadron: An injection of Decadron 4 mg , \par Kenalog: An injection of Kenalog 5 mg , \par Lidocaine: 2 cc. \par \par Patient has tried OTC's including aspirin, Ibuprofen, Aleve etc or prescription NSAIDS, and/or exercises at home and/ or physical therapy without satisfactory response and Patient has decreased mobility in the joint. The risks, benefits, and alternatives to cortisone injection were explained in full to the patient. Risks outlined include but are not limited to infection, sepsis, bleeding, scarring, skin discoloration, temporary increase in pain, syncopal episode, failure to resolve symptoms, allergic reaction, symptom recurrence, and elevation of blood sugar in diabetics. Patient understood the risks. All questions were answered. After discussion of options, patient requested an injection. Oral informed consent was obtained. Sterile technique was utilized for the procedure including the preparation of the solutions used for the injection. Injection site was prepped with betadine and alcohol. Ethyl chloride sprayed topically. Sterile technique used. Patient tolerated procedure well. \par \par Post Procedure Instructions: Patient was advised to call if redness, pain, or fever occur. Apply ice for 15 min. every 2 hours for the next 12-24 hours as tolerated. Patient was advised to rest the joint(s) for 1-2 days.\par \par \par \par

## 2022-12-20 NOTE — PHYSICAL EXAM
[Right] : right knee [5___] : hamstring 5[unfilled]/5 [Left] : left knee [NL (0)] : extension 0 degrees [4___] : hamstring 4[unfilled]/5 [de-identified] : extension 10 degrees [de-identified] : left lateral bending 5 degrees [de-identified] : right lateral bending 5 degrees [TWNoteComboBox6] : internal rotation L4 [de-identified] : external rotation 70 degrees [] : no erythema [TWNoteComboBox7] : flexion 110 degrees

## 2022-12-20 NOTE — HISTORY OF PRESENT ILLNESS
[9] : 9 [7] : 7 [Dull/Aching] : dull/aching [Stabbing] : stabbing [de-identified] : Follow up today. Symptoms continue.  [] : Post Surgical Visit: no [FreeTextEntry1] : kevin knees, left shoulder, back [de-identified] : physical therapy

## 2023-01-05 NOTE — ED ADULT NURSE NOTE - PT NEEDS ASSIST
yes Acitretin Counseling:  I discussed with the patient the risks of acitretin including but not limited to hair loss, dry lips/skin/eyes, liver damage, hyperlipidemia, depression/suicidal ideation, photosensitivity.  Serious rare side effects can include but are not limited to pancreatitis, pseudotumor cerebri, bony changes, clot formation/stroke/heart attack.  Patient understands that alcohol is contraindicated since it can result in liver toxicity and significantly prolong the elimination of the drug by many years.

## 2023-03-15 ENCOUNTER — APPOINTMENT (OUTPATIENT)
Dept: PAIN MANAGEMENT | Facility: CLINIC | Age: 83
End: 2023-03-15

## 2023-08-29 NOTE — ED ADULT NURSE NOTE - HARM RISK FACTORS
Patient had device explanted 8/18/23 due to infection--has wound vac placed.  New implant scheduled 8/31/23-see hospital notes.    yes

## 2024-01-01 ENCOUNTER — OUTPATIENT (OUTPATIENT)
Dept: OUTPATIENT SERVICES | Facility: HOSPITAL | Age: 84
LOS: 1 days | End: 2024-01-01
Payer: MEDICARE

## 2024-01-01 ENCOUNTER — INPATIENT (INPATIENT)
Facility: HOSPITAL | Age: 84
LOS: 3 days | Discharge: NURSING, REHAB OR LONG-TERM CARE | DRG: 920 | End: 2024-07-09
Attending: STUDENT IN AN ORGANIZED HEALTH CARE EDUCATION/TRAINING PROGRAM | Admitting: STUDENT IN AN ORGANIZED HEALTH CARE EDUCATION/TRAINING PROGRAM
Payer: MEDICARE

## 2024-01-01 VITALS
TEMPERATURE: 97 F | WEIGHT: 279.99 LBS | SYSTOLIC BLOOD PRESSURE: 115 MMHG | HEIGHT: 58 IN | DIASTOLIC BLOOD PRESSURE: 79 MMHG | HEART RATE: 50 BPM | RESPIRATION RATE: 14 BRPM | OXYGEN SATURATION: 93 %

## 2024-01-01 VITALS
DIASTOLIC BLOOD PRESSURE: 64 MMHG | SYSTOLIC BLOOD PRESSURE: 103 MMHG | HEART RATE: 109 BPM | TEMPERATURE: 98 F | OXYGEN SATURATION: 92 % | RESPIRATION RATE: 18 BRPM

## 2024-01-01 VITALS
HEART RATE: 76 BPM | RESPIRATION RATE: 16 BRPM | TEMPERATURE: 98 F | SYSTOLIC BLOOD PRESSURE: 103 MMHG | WEIGHT: 275.58 LBS | DIASTOLIC BLOOD PRESSURE: 70 MMHG | OXYGEN SATURATION: 99 %

## 2024-01-01 DIAGNOSIS — D64.9 ANEMIA, UNSPECIFIED: ICD-10-CM

## 2024-01-01 DIAGNOSIS — R57.1 HYPOVOLEMIC SHOCK: ICD-10-CM

## 2024-01-01 DIAGNOSIS — Z98.890 OTHER SPECIFIED POSTPROCEDURAL STATES: Chronic | ICD-10-CM

## 2024-01-01 DIAGNOSIS — I50.32 CHRONIC DIASTOLIC (CONGESTIVE) HEART FAILURE: ICD-10-CM

## 2024-01-01 DIAGNOSIS — G47.33 OBSTRUCTIVE SLEEP APNEA (ADULT) (PEDIATRIC): ICD-10-CM

## 2024-01-01 DIAGNOSIS — J44.9 CHRONIC OBSTRUCTIVE PULMONARY DISEASE, UNSPECIFIED: ICD-10-CM

## 2024-01-01 DIAGNOSIS — Z98.891 HISTORY OF UTERINE SCAR FROM PREVIOUS SURGERY: Chronic | ICD-10-CM

## 2024-01-01 DIAGNOSIS — Z96.612 PRESENCE OF LEFT ARTIFICIAL SHOULDER JOINT: Chronic | ICD-10-CM

## 2024-01-01 DIAGNOSIS — Z01.818 ENCOUNTER FOR OTHER PREPROCEDURAL EXAMINATION: ICD-10-CM

## 2024-01-01 DIAGNOSIS — J45.909 UNSPECIFIED ASTHMA, UNCOMPLICATED: ICD-10-CM

## 2024-01-01 DIAGNOSIS — C56.9 MALIGNANT NEOPLASM OF UNSPECIFIED OVARY: ICD-10-CM

## 2024-01-01 DIAGNOSIS — R18.0 MALIGNANT ASCITES: ICD-10-CM

## 2024-01-01 LAB
ALBUMIN SERPL ELPH-MCNC: 10.7 G/DL — HIGH (ref 3.3–5)
ALBUMIN SERPL ELPH-MCNC: 2.5 G/DL — LOW (ref 3.3–5)
ALBUMIN SERPL ELPH-MCNC: 2.7 G/DL — LOW (ref 3.3–5)
ALP SERPL-CCNC: 100 U/L — SIGNIFICANT CHANGE UP (ref 40–120)
ALP SERPL-CCNC: 82 U/L — SIGNIFICANT CHANGE UP (ref 40–120)
ALP SERPL-CCNC: 93 U/L — SIGNIFICANT CHANGE UP (ref 40–120)
ALT FLD-CCNC: 6 U/L — LOW (ref 10–45)
ALT FLD-CCNC: 6 U/L — LOW (ref 10–45)
ALT FLD-CCNC: 7 U/L — LOW (ref 10–45)
ANION GAP SERPL CALC-SCNC: 10 MMOL/L — SIGNIFICANT CHANGE UP (ref 5–17)
ANION GAP SERPL CALC-SCNC: 10 MMOL/L — SIGNIFICANT CHANGE UP (ref 5–17)
ANION GAP SERPL CALC-SCNC: 39 MMOL/L — HIGH (ref 5–17)
APPEARANCE UR: CLEAR — SIGNIFICANT CHANGE UP
APTT BLD: 28 SEC — SIGNIFICANT CHANGE UP (ref 24.5–35.6)
APTT BLD: 30.7 SEC — SIGNIFICANT CHANGE UP (ref 24.5–35.6)
AST SERPL-CCNC: 12 U/L — SIGNIFICANT CHANGE UP (ref 10–40)
AST SERPL-CCNC: 9 U/L — LOW (ref 10–40)
AST SERPL-CCNC: 9 U/L — LOW (ref 10–40)
BACTERIA # UR AUTO: ABNORMAL /HPF
BASE EXCESS BLDV CALC-SCNC: -7.5 MMOL/L — LOW (ref -2–3)
BASE EXCESS BLDV CALC-SCNC: 8 MMOL/L — HIGH (ref -2–3)
BASE EXCESS BLDV CALC-SCNC: 8.3 MMOL/L — HIGH (ref -2–3)
BASOPHILS # BLD AUTO: 0.04 K/UL — SIGNIFICANT CHANGE UP (ref 0–0.2)
BASOPHILS NFR BLD AUTO: 0.5 % — SIGNIFICANT CHANGE UP (ref 0–2)
BILIRUB SERPL-MCNC: 0.2 MG/DL — SIGNIFICANT CHANGE UP (ref 0.2–1.2)
BILIRUB SERPL-MCNC: 0.4 MG/DL — SIGNIFICANT CHANGE UP (ref 0.2–1.2)
BILIRUB SERPL-MCNC: 0.4 MG/DL — SIGNIFICANT CHANGE UP (ref 0.2–1.2)
BILIRUB UR-MCNC: NEGATIVE — SIGNIFICANT CHANGE UP
BLD GP AB SCN SERPL QL: NEGATIVE — SIGNIFICANT CHANGE UP
BUN SERPL-MCNC: 27 MG/DL — HIGH (ref 7–23)
BUN SERPL-MCNC: 34 MG/DL — HIGH (ref 7–23)
BUN SERPL-MCNC: 35 MG/DL — HIGH (ref 7–23)
CA-I SERPL-SCNC: 0.52 MMOL/L — CRITICAL LOW (ref 1.15–1.33)
CA-I SERPL-SCNC: 1.25 MMOL/L — SIGNIFICANT CHANGE UP (ref 1.15–1.33)
CALCIUM SERPL-MCNC: 6.2 MG/DL — CRITICAL LOW (ref 8.4–10.5)
CALCIUM SERPL-MCNC: 8.3 MG/DL — LOW (ref 8.4–10.5)
CALCIUM SERPL-MCNC: 8.8 MG/DL — SIGNIFICANT CHANGE UP (ref 8.4–10.5)
CAST: 1 /LPF — SIGNIFICANT CHANGE UP (ref 0–4)
CHLORIDE BLDV-SCNC: 100 MMOL/L — SIGNIFICANT CHANGE UP (ref 96–108)
CHLORIDE BLDV-SCNC: 92 MMOL/L — LOW (ref 96–108)
CHLORIDE SERPL-SCNC: 83 MMOL/L — LOW (ref 96–108)
CHLORIDE SERPL-SCNC: 98 MMOL/L — SIGNIFICANT CHANGE UP (ref 96–108)
CHLORIDE SERPL-SCNC: 99 MMOL/L — SIGNIFICANT CHANGE UP (ref 96–108)
CO2 BLDV-SCNC: 25 MMOL/L — SIGNIFICANT CHANGE UP (ref 22–26)
CO2 BLDV-SCNC: 37 MMOL/L — HIGH (ref 22–26)
CO2 BLDV-SCNC: 38 MMOL/L — HIGH (ref 22–26)
CO2 SERPL-SCNC: 17 MMOL/L — LOW (ref 22–31)
CO2 SERPL-SCNC: 29 MMOL/L — SIGNIFICANT CHANGE UP (ref 22–31)
CO2 SERPL-SCNC: 31 MMOL/L — SIGNIFICANT CHANGE UP (ref 22–31)
COLOR SPEC: SIGNIFICANT CHANGE UP
CREAT SERPL-MCNC: 0.42 MG/DL — LOW (ref 0.5–1.3)
CREAT SERPL-MCNC: 0.43 MG/DL — LOW (ref 0.5–1.3)
CREAT SERPL-MCNC: <0.3 MG/DL — LOW (ref 0.5–1.3)
CULTURE RESULTS: SIGNIFICANT CHANGE UP
CULTURE RESULTS: SIGNIFICANT CHANGE UP
DIFF PNL FLD: NEGATIVE — SIGNIFICANT CHANGE UP
EGFR: 106 ML/MIN/1.73M2 — SIGNIFICANT CHANGE UP
EGFR: 97 ML/MIN/1.73M2 — SIGNIFICANT CHANGE UP
EGFR: 98 ML/MIN/1.73M2 — SIGNIFICANT CHANGE UP
EOSINOPHIL # BLD AUTO: 0.09 K/UL — SIGNIFICANT CHANGE UP (ref 0–0.5)
EOSINOPHIL NFR BLD AUTO: 1.1 % — SIGNIFICANT CHANGE UP (ref 0–6)
FLUAV AG NPH QL: SIGNIFICANT CHANGE UP
FLUBV AG NPH QL: SIGNIFICANT CHANGE UP
GAS PNL BLDA: SIGNIFICANT CHANGE UP
GAS PNL BLDV: 134 MMOL/L — LOW (ref 136–145)
GAS PNL BLDV: 142 MMOL/L — SIGNIFICANT CHANGE UP (ref 136–145)
GAS PNL BLDV: SIGNIFICANT CHANGE UP
GLUCOSE BLDC GLUCOMTR-MCNC: 93 MG/DL — SIGNIFICANT CHANGE UP (ref 70–99)
GLUCOSE BLDV-MCNC: 51 MG/DL — CRITICAL LOW (ref 70–99)
GLUCOSE BLDV-MCNC: 85 MG/DL — SIGNIFICANT CHANGE UP (ref 70–99)
GLUCOSE SERPL-MCNC: 61 MG/DL — LOW (ref 70–99)
GLUCOSE SERPL-MCNC: 85 MG/DL — SIGNIFICANT CHANGE UP (ref 70–99)
GLUCOSE SERPL-MCNC: 88 MG/DL — SIGNIFICANT CHANGE UP (ref 70–99)
GLUCOSE UR QL: NEGATIVE MG/DL — SIGNIFICANT CHANGE UP
HCO3 BLDV-SCNC: 22 MMOL/L — SIGNIFICANT CHANGE UP (ref 22–29)
HCO3 BLDV-SCNC: 35 MMOL/L — HIGH (ref 22–29)
HCO3 BLDV-SCNC: 36 MMOL/L — HIGH (ref 22–29)
HCT VFR BLD CALC: 22.6 % — LOW (ref 34.5–45)
HCT VFR BLD CALC: 25.1 % — LOW (ref 34.5–45)
HCT VFR BLD CALC: 28.2 % — LOW (ref 34.5–45)
HCT VFR BLDA CALC: 20 % — CRITICAL LOW (ref 34.5–46.5)
HCT VFR BLDA CALC: 25 % — LOW (ref 34.5–46.5)
HGB BLD CALC-MCNC: 6.6 G/DL — CRITICAL LOW (ref 11.7–16.1)
HGB BLD CALC-MCNC: 8.3 G/DL — LOW (ref 11.7–16.1)
HGB BLD-MCNC: 6.7 G/DL — CRITICAL LOW (ref 11.5–15.5)
HGB BLD-MCNC: 7.8 G/DL — LOW (ref 11.5–15.5)
HGB BLD-MCNC: 8.2 G/DL — LOW (ref 11.5–15.5)
IMM GRANULOCYTES NFR BLD AUTO: 0.8 % — SIGNIFICANT CHANGE UP (ref 0–0.9)
INR BLD: 1.22 RATIO — HIGH (ref 0.85–1.18)
INR BLD: 1.26 RATIO — HIGH (ref 0.85–1.18)
INR BLD: 1.4 RATIO — HIGH (ref 0.85–1.18)
KETONES UR-MCNC: NEGATIVE MG/DL — SIGNIFICANT CHANGE UP
LACTATE BLDV-MCNC: 0.8 MMOL/L — SIGNIFICANT CHANGE UP (ref 0.5–2)
LACTATE BLDV-MCNC: 1.1 MMOL/L — SIGNIFICANT CHANGE UP (ref 0.5–2)
LEUKOCYTE ESTERASE UR-ACNC: ABNORMAL
LYMPHOCYTES # BLD AUTO: 0.77 K/UL — LOW (ref 1–3.3)
LYMPHOCYTES # BLD AUTO: 9.7 % — LOW (ref 13–44)
MAGNESIUM SERPL-MCNC: 1.4 MG/DL — LOW (ref 1.6–2.6)
MAGNESIUM SERPL-MCNC: 1.8 MG/DL — SIGNIFICANT CHANGE UP (ref 1.6–2.6)
MAGNESIUM SERPL-MCNC: 1.9 MG/DL — SIGNIFICANT CHANGE UP (ref 1.6–2.6)
MCHC RBC-ENTMCNC: 26.4 PG — LOW (ref 27–34)
MCHC RBC-ENTMCNC: 26.9 PG — LOW (ref 27–34)
MCHC RBC-ENTMCNC: 27.2 PG — SIGNIFICANT CHANGE UP (ref 27–34)
MCHC RBC-ENTMCNC: 29.1 GM/DL — LOW (ref 32–36)
MCHC RBC-ENTMCNC: 29.6 GM/DL — LOW (ref 32–36)
MCHC RBC-ENTMCNC: 31.1 GM/DL — LOW (ref 32–36)
MCV RBC AUTO: 87.5 FL — SIGNIFICANT CHANGE UP (ref 80–100)
MCV RBC AUTO: 90.7 FL — SIGNIFICANT CHANGE UP (ref 80–100)
MCV RBC AUTO: 90.8 FL — SIGNIFICANT CHANGE UP (ref 80–100)
MONOCYTES # BLD AUTO: 0.91 K/UL — HIGH (ref 0–0.9)
MONOCYTES NFR BLD AUTO: 11.4 % — SIGNIFICANT CHANGE UP (ref 2–14)
MRSA PCR RESULT.: SIGNIFICANT CHANGE UP
NEUTROPHILS # BLD AUTO: 6.1 K/UL — SIGNIFICANT CHANGE UP (ref 1.8–7.4)
NEUTROPHILS NFR BLD AUTO: 76.5 % — SIGNIFICANT CHANGE UP (ref 43–77)
NITRITE UR-MCNC: NEGATIVE — SIGNIFICANT CHANGE UP
NRBC # BLD: 0 /100 WBCS — SIGNIFICANT CHANGE UP (ref 0–0)
PCO2 BLDV: 63 MMHG — HIGH (ref 39–42)
PCO2 BLDV: 66 MMHG — HIGH (ref 39–42)
PCO2 BLDV: 79 MMHG — CRITICAL HIGH (ref 39–42)
PH BLDV: 7.06 — CRITICAL LOW (ref 7.32–7.43)
PH BLDV: 7.35 — SIGNIFICANT CHANGE UP (ref 7.32–7.43)
PH BLDV: 7.35 — SIGNIFICANT CHANGE UP (ref 7.32–7.43)
PH UR: 6.5 — SIGNIFICANT CHANGE UP (ref 5–8)
PHOSPHATE SERPL-MCNC: 2.5 MG/DL — SIGNIFICANT CHANGE UP (ref 2.5–4.5)
PHOSPHATE SERPL-MCNC: 3.4 MG/DL — SIGNIFICANT CHANGE UP (ref 2.5–4.5)
PHOSPHATE SERPL-MCNC: 3.5 MG/DL — SIGNIFICANT CHANGE UP (ref 2.5–4.5)
PLATELET # BLD AUTO: 208 K/UL — SIGNIFICANT CHANGE UP (ref 150–400)
PLATELET # BLD AUTO: 298 K/UL — SIGNIFICANT CHANGE UP (ref 150–400)
PLATELET # BLD AUTO: 301 K/UL — SIGNIFICANT CHANGE UP (ref 150–400)
PO2 BLDV: 38 MMHG — SIGNIFICANT CHANGE UP (ref 25–45)
PO2 BLDV: 43 MMHG — SIGNIFICANT CHANGE UP (ref 25–45)
PO2 BLDV: 47 MMHG — HIGH (ref 25–45)
POTASSIUM BLDV-SCNC: 3 MMOL/L — LOW (ref 3.5–5.1)
POTASSIUM BLDV-SCNC: 3.6 MMOL/L — SIGNIFICANT CHANGE UP (ref 3.5–5.1)
POTASSIUM SERPL-MCNC: 3.3 MMOL/L — LOW (ref 3.5–5.3)
POTASSIUM SERPL-MCNC: 3.7 MMOL/L — SIGNIFICANT CHANGE UP (ref 3.5–5.3)
POTASSIUM SERPL-MCNC: 3.9 MMOL/L — SIGNIFICANT CHANGE UP (ref 3.5–5.3)
POTASSIUM SERPL-SCNC: 3.3 MMOL/L — LOW (ref 3.5–5.3)
POTASSIUM SERPL-SCNC: 3.7 MMOL/L — SIGNIFICANT CHANGE UP (ref 3.5–5.3)
POTASSIUM SERPL-SCNC: 3.9 MMOL/L — SIGNIFICANT CHANGE UP (ref 3.5–5.3)
PROCALCITONIN SERPL-MCNC: 0.12 NG/ML — HIGH (ref 0.02–0.1)
PROT SERPL-MCNC: 13.1 G/DL — HIGH (ref 6–8.3)
PROT SERPL-MCNC: 5.3 G/DL — LOW (ref 6–8.3)
PROT SERPL-MCNC: 5.5 G/DL — LOW (ref 6–8.3)
PROT UR-MCNC: NEGATIVE MG/DL — SIGNIFICANT CHANGE UP
PROTHROM AB SERPL-ACNC: 13.3 SEC — HIGH (ref 9.5–13)
PROTHROM AB SERPL-ACNC: 13.8 SEC — HIGH (ref 9.5–13)
PROTHROM AB SERPL-ACNC: 15.3 SEC — HIGH (ref 9.5–13)
RBC # BLD: 2.49 M/UL — LOW (ref 3.8–5.2)
RBC # BLD: 2.87 M/UL — LOW (ref 3.8–5.2)
RBC # BLD: 3.11 M/UL — LOW (ref 3.8–5.2)
RBC # FLD: 17.9 % — HIGH (ref 10.3–14.5)
RBC # FLD: 17.9 % — HIGH (ref 10.3–14.5)
RBC # FLD: 18.2 % — HIGH (ref 10.3–14.5)
RBC CASTS # UR COMP ASSIST: 2 /HPF — SIGNIFICANT CHANGE UP (ref 0–4)
REVIEW: SIGNIFICANT CHANGE UP
RH IG SCN BLD-IMP: POSITIVE — SIGNIFICANT CHANGE UP
RSV RNA NPH QL NAA+NON-PROBE: SIGNIFICANT CHANGE UP
S AUREUS DNA NOSE QL NAA+PROBE: DETECTED
SAO2 % BLDV: 50.6 % — LOW (ref 67–88)
SAO2 % BLDV: 66.4 % — LOW (ref 67–88)
SAO2 % BLDV: 75.2 % — SIGNIFICANT CHANGE UP (ref 67–88)
SARS-COV-2 RNA SPEC QL NAA+PROBE: SIGNIFICANT CHANGE UP
SODIUM SERPL-SCNC: 138 MMOL/L — SIGNIFICANT CHANGE UP (ref 135–145)
SODIUM SERPL-SCNC: 139 MMOL/L — SIGNIFICANT CHANGE UP (ref 135–145)
SODIUM SERPL-SCNC: 139 MMOL/L — SIGNIFICANT CHANGE UP (ref 135–145)
SP GR SPEC: 1.01 — SIGNIFICANT CHANGE UP (ref 1–1.03)
SPECIMEN SOURCE: SIGNIFICANT CHANGE UP
SPECIMEN SOURCE: SIGNIFICANT CHANGE UP
SQUAMOUS # UR AUTO: 7 /HPF — HIGH (ref 0–5)
UROBILINOGEN FLD QL: 0.2 MG/DL — SIGNIFICANT CHANGE UP (ref 0.2–1)
WBC # BLD: 4.91 K/UL — SIGNIFICANT CHANGE UP (ref 3.8–10.5)
WBC # BLD: 7.61 K/UL — SIGNIFICANT CHANGE UP (ref 3.8–10.5)
WBC # BLD: 7.97 K/UL — SIGNIFICANT CHANGE UP (ref 3.8–10.5)
WBC # FLD AUTO: 4.91 K/UL — SIGNIFICANT CHANGE UP (ref 3.8–10.5)
WBC # FLD AUTO: 7.61 K/UL — SIGNIFICANT CHANGE UP (ref 3.8–10.5)
WBC # FLD AUTO: 7.97 K/UL — SIGNIFICANT CHANGE UP (ref 3.8–10.5)
WBC UR QL: 5 /HPF — SIGNIFICANT CHANGE UP (ref 0–5)

## 2024-01-01 PROCEDURE — 83735 ASSAY OF MAGNESIUM: CPT

## 2024-01-01 PROCEDURE — 49418 INSERT TUN IP CATH PERC: CPT

## 2024-01-01 PROCEDURE — 84145 PROCALCITONIN (PCT): CPT

## 2024-01-01 PROCEDURE — 99291 CRITICAL CARE FIRST HOUR: CPT | Mod: 25,GC,GW

## 2024-01-01 PROCEDURE — 94640 AIRWAY INHALATION TREATMENT: CPT

## 2024-01-01 PROCEDURE — 74177 CT ABD & PELVIS W/CONTRAST: CPT | Mod: 26

## 2024-01-01 PROCEDURE — 84100 ASSAY OF PHOSPHORUS: CPT

## 2024-01-01 PROCEDURE — 84132 ASSAY OF SERUM POTASSIUM: CPT

## 2024-01-01 PROCEDURE — C1769: CPT

## 2024-01-01 PROCEDURE — 76604 US EXAM CHEST: CPT | Mod: 26

## 2024-01-01 PROCEDURE — 86901 BLOOD TYPING SEROLOGIC RH(D): CPT

## 2024-01-01 PROCEDURE — 97166 OT EVAL MOD COMPLEX 45 MIN: CPT

## 2024-01-01 PROCEDURE — 86850 RBC ANTIBODY SCREEN: CPT

## 2024-01-01 PROCEDURE — 36415 COLL VENOUS BLD VENIPUNCTURE: CPT

## 2024-01-01 PROCEDURE — 85610 PROTHROMBIN TIME: CPT

## 2024-01-01 PROCEDURE — C1887: CPT

## 2024-01-01 PROCEDURE — 86900 BLOOD TYPING SEROLOGIC ABO: CPT

## 2024-01-01 PROCEDURE — 99232 SBSQ HOSP IP/OBS MODERATE 35: CPT | Mod: GC

## 2024-01-01 PROCEDURE — 87640 STAPH A DNA AMP PROBE: CPT

## 2024-01-01 PROCEDURE — 82330 ASSAY OF CALCIUM: CPT

## 2024-01-01 PROCEDURE — 85014 HEMATOCRIT: CPT

## 2024-01-01 PROCEDURE — 87040 BLOOD CULTURE FOR BACTERIA: CPT

## 2024-01-01 PROCEDURE — G0463: CPT

## 2024-01-01 PROCEDURE — 84295 ASSAY OF SERUM SODIUM: CPT

## 2024-01-01 PROCEDURE — 82947 ASSAY GLUCOSE BLOOD QUANT: CPT

## 2024-01-01 PROCEDURE — 93005 ELECTROCARDIOGRAM TRACING: CPT

## 2024-01-01 PROCEDURE — 93010 ELECTROCARDIOGRAM REPORT: CPT

## 2024-01-01 PROCEDURE — 99233 SBSQ HOSP IP/OBS HIGH 50: CPT | Mod: GC

## 2024-01-01 PROCEDURE — 85018 HEMOGLOBIN: CPT

## 2024-01-01 PROCEDURE — 85027 COMPLETE CBC AUTOMATED: CPT

## 2024-01-01 PROCEDURE — 82435 ASSAY OF BLOOD CHLORIDE: CPT

## 2024-01-01 PROCEDURE — P9047: CPT

## 2024-01-01 PROCEDURE — 87637 SARSCOV2&INF A&B&RSV AMP PRB: CPT

## 2024-01-01 PROCEDURE — 99239 HOSP IP/OBS DSCHRG MGMT >30: CPT

## 2024-01-01 PROCEDURE — 74177 CT ABD & PELVIS W/CONTRAST: CPT | Mod: MC

## 2024-01-01 PROCEDURE — 99497 ADVNCD CARE PLAN 30 MIN: CPT | Mod: 25

## 2024-01-01 PROCEDURE — 99232 SBSQ HOSP IP/OBS MODERATE 35: CPT

## 2024-01-01 PROCEDURE — 87641 MR-STAPH DNA AMP PROBE: CPT

## 2024-01-01 PROCEDURE — 82803 BLOOD GASES ANY COMBINATION: CPT

## 2024-01-01 PROCEDURE — 85025 COMPLETE CBC W/AUTO DIFF WBC: CPT

## 2024-01-01 PROCEDURE — 83605 ASSAY OF LACTIC ACID: CPT

## 2024-01-01 PROCEDURE — 97162 PT EVAL MOD COMPLEX 30 MIN: CPT

## 2024-01-01 PROCEDURE — 80053 COMPREHEN METABOLIC PANEL: CPT

## 2024-01-01 PROCEDURE — 82962 GLUCOSE BLOOD TEST: CPT

## 2024-01-01 PROCEDURE — 81001 URINALYSIS AUTO W/SCOPE: CPT

## 2024-01-01 PROCEDURE — 94660 CPAP INITIATION&MGMT: CPT

## 2024-01-01 PROCEDURE — C1894: CPT

## 2024-01-01 PROCEDURE — P9045: CPT

## 2024-01-01 PROCEDURE — C1729: CPT

## 2024-01-01 PROCEDURE — 85730 THROMBOPLASTIN TIME PARTIAL: CPT

## 2024-01-01 RX ORDER — ONDANSETRON HYDROCHLORIDE 2 MG/ML
4 INJECTION INTRAMUSCULAR; INTRAVENOUS ONCE
Refills: 0 | Status: COMPLETED | OUTPATIENT
Start: 2024-01-01 | End: 2024-01-01

## 2024-01-01 RX ORDER — ALBUTEROL 90 MCG
2 AEROSOL REFILL (GRAM) INHALATION EVERY 6 HOURS
Refills: 0 | Status: DISCONTINUED | OUTPATIENT
Start: 2024-01-01 | End: 2024-01-01

## 2024-01-01 RX ORDER — ALBUMIN HUMAN 5 %
250 INTRAVENOUS SOLUTION INTRAVENOUS ONCE
Refills: 0 | Status: COMPLETED | OUTPATIENT
Start: 2024-01-01 | End: 2024-01-01

## 2024-01-01 RX ORDER — PHENYLEPHRINE HYDROCHLORIDE 10 MG/ML
0.5 INJECTION INTRAVENOUS
Qty: 160 | Refills: 0 | Status: DISCONTINUED | OUTPATIENT
Start: 2024-01-01 | End: 2024-01-01

## 2024-01-01 RX ORDER — DEXTROSE MONOHYDRATE AND SODIUM CHLORIDE 5; .3 G/100ML; G/100ML
500 INJECTION, SOLUTION INTRAVENOUS ONCE
Refills: 0 | Status: COMPLETED | OUTPATIENT
Start: 2024-01-01 | End: 2024-01-01

## 2024-01-01 RX ORDER — BUDESONIDE/FORMOTEROL FUMARATE 160-4.5MCG
2 HFA AEROSOL WITH ADAPTER (GRAM) INHALATION
Refills: 0 | Status: DISCONTINUED | OUTPATIENT
Start: 2024-01-01 | End: 2024-01-01

## 2024-01-01 RX ORDER — ONDANSETRON HYDROCHLORIDE 2 MG/ML
4 INJECTION INTRAMUSCULAR; INTRAVENOUS EVERY 8 HOURS
Refills: 0 | Status: DISCONTINUED | OUTPATIENT
Start: 2024-01-01 | End: 2024-01-01

## 2024-01-01 RX ORDER — FLUTICASONE PROPIONATE 50 UG/1
1 SPRAY, METERED NASAL
Refills: 0 | Status: DISCONTINUED | OUTPATIENT
Start: 2024-01-01 | End: 2024-01-01

## 2024-01-01 RX ORDER — ONDANSETRON HYDROCHLORIDE 2 MG/ML
4 INJECTION INTRAMUSCULAR; INTRAVENOUS EVERY 8 HOURS
Refills: 0 | Status: COMPLETED | OUTPATIENT
Start: 2024-01-01 | End: 2024-01-01

## 2024-01-01 RX ORDER — POLYETHYLENE GLYCOL 3350 1 G/G
17 POWDER ORAL DAILY
Refills: 0 | Status: DISCONTINUED | OUTPATIENT
Start: 2024-01-01 | End: 2024-01-01

## 2024-01-01 RX ORDER — MONTELUKAST SODIUM 10 MG/1
10 TABLET, FILM COATED ORAL DAILY
Refills: 0 | Status: DISCONTINUED | OUTPATIENT
Start: 2024-01-01 | End: 2024-01-01

## 2024-01-01 RX ORDER — NYSTATIN 100000/G
1 POWDER (GRAM) TOPICAL
Refills: 0 | Status: DISCONTINUED | OUTPATIENT
Start: 2024-01-01 | End: 2024-01-01

## 2024-01-01 RX ORDER — ALBUMIN HUMAN 5 %
50 INTRAVENOUS SOLUTION INTRAVENOUS ONCE
Refills: 0 | Status: COMPLETED | OUTPATIENT
Start: 2024-01-01 | End: 2024-01-01

## 2024-01-01 RX ORDER — ASPIRIN 325 MG/1
81 TABLET, FILM COATED ORAL DAILY
Refills: 0 | Status: DISCONTINUED | OUTPATIENT
Start: 2024-01-01 | End: 2024-01-01

## 2024-01-01 RX ORDER — PHENYLEPHRINE HYDROCHLORIDE 10 MG/ML
0.5 INJECTION INTRAVENOUS
Qty: 40 | Refills: 0 | Status: DISCONTINUED | OUTPATIENT
Start: 2024-01-01 | End: 2024-01-01

## 2024-01-01 RX ORDER — GABAPENTIN
0 POWDER (GRAM) MISCELLANEOUS
Refills: 0 | DISCHARGE

## 2024-01-01 RX ORDER — HEPARIN SODIUM 50 [USP'U]/ML
5000 INJECTION, SOLUTION INTRAVENOUS EVERY 12 HOURS
Refills: 0 | Status: DISCONTINUED | OUTPATIENT
Start: 2024-01-01 | End: 2024-01-01

## 2024-01-01 RX ORDER — OXYBUTYNIN CHLORIDE 5 MG
10 TABLET ORAL EVERY 24 HOURS
Refills: 0 | Status: DISCONTINUED | OUTPATIENT
Start: 2024-01-01 | End: 2024-01-01

## 2024-01-01 RX ORDER — MIDODRINE HYDROCHLORIDE 10 MG/1
15 TABLET ORAL ONCE
Refills: 0 | Status: COMPLETED | OUTPATIENT
Start: 2024-01-01 | End: 2024-01-01

## 2024-01-01 RX ORDER — ALBUMIN HUMAN 5 %
100 INTRAVENOUS SOLUTION INTRAVENOUS ONCE
Refills: 0 | Status: COMPLETED | OUTPATIENT
Start: 2024-01-01 | End: 2024-01-01

## 2024-01-01 RX ORDER — LEVOTHYROXINE SODIUM 25 MCG
50 TABLET ORAL DAILY
Refills: 0 | Status: DISCONTINUED | OUTPATIENT
Start: 2024-01-01 | End: 2024-01-01

## 2024-01-01 RX ORDER — POLYETHYLENE GLYCOL 3350 1 G/G
17 POWDER ORAL
Qty: 0 | Refills: 0 | DISCHARGE
Start: 2024-01-01

## 2024-01-01 RX ORDER — ASPIRIN 325 MG/1
0 TABLET, FILM COATED ORAL
Refills: 0 | DISCHARGE

## 2024-01-01 RX ADMIN — MONTELUKAST SODIUM 10 MILLIGRAM(S): 10 TABLET, FILM COATED ORAL at 11:23

## 2024-01-01 RX ADMIN — Medication 10 MILLIGRAM(S): at 12:09

## 2024-01-01 RX ADMIN — Medication 10 MILLIGRAM(S): at 11:26

## 2024-01-01 RX ADMIN — DEXTROSE MONOHYDRATE AND SODIUM CHLORIDE 1000 MILLILITER(S): 5; .3 INJECTION, SOLUTION INTRAVENOUS at 17:48

## 2024-01-01 RX ADMIN — FLUTICASONE PROPIONATE 1 SPRAY(S): 50 SPRAY, METERED NASAL at 07:13

## 2024-01-01 RX ADMIN — ASPIRIN 81 MILLIGRAM(S): 325 TABLET, FILM COATED ORAL at 12:28

## 2024-01-01 RX ADMIN — Medication 50 MICROGRAM(S): at 05:21

## 2024-01-01 RX ADMIN — ONDANSETRON HYDROCHLORIDE 4 MILLIGRAM(S): 2 INJECTION INTRAMUSCULAR; INTRAVENOUS at 02:25

## 2024-01-01 RX ADMIN — Medication 1 APPLICATION(S): at 17:28

## 2024-01-01 RX ADMIN — Medication 2 PUFF(S): at 07:50

## 2024-01-01 RX ADMIN — Medication 50 MILLILITER(S): at 16:34

## 2024-01-01 RX ADMIN — Medication 50 MILLILITER(S): at 16:25

## 2024-01-01 RX ADMIN — ASPIRIN 81 MILLIGRAM(S): 325 TABLET, FILM COATED ORAL at 12:11

## 2024-01-01 RX ADMIN — ASPIRIN 81 MILLIGRAM(S): 325 TABLET, FILM COATED ORAL at 11:25

## 2024-01-01 RX ADMIN — DEXTROSE MONOHYDRATE AND SODIUM CHLORIDE 1000 MILLILITER(S): 5; .3 INJECTION, SOLUTION INTRAVENOUS at 16:27

## 2024-01-01 RX ADMIN — Medication 50 MICROGRAM(S): at 07:13

## 2024-01-01 RX ADMIN — Medication 1 APPLICATION(S): at 07:14

## 2024-01-01 RX ADMIN — FLUTICASONE PROPIONATE 1 SPRAY(S): 50 SPRAY, METERED NASAL at 06:57

## 2024-01-01 RX ADMIN — HEPARIN SODIUM 5000 UNIT(S): 50 INJECTION, SOLUTION INTRAVENOUS at 17:28

## 2024-01-01 RX ADMIN — Medication 250 MILLILITER(S): at 19:33

## 2024-01-01 RX ADMIN — ONDANSETRON HYDROCHLORIDE 4 MILLIGRAM(S): 2 INJECTION INTRAMUSCULAR; INTRAVENOUS at 17:00

## 2024-01-01 RX ADMIN — Medication 50 MICROGRAM(S): at 07:00

## 2024-01-01 RX ADMIN — Medication 2 PUFF(S): at 17:28

## 2024-01-01 RX ADMIN — ONDANSETRON HYDROCHLORIDE 4 MILLIGRAM(S): 2 INJECTION INTRAMUSCULAR; INTRAVENOUS at 09:43

## 2024-01-01 RX ADMIN — Medication 2 PUFF(S): at 06:57

## 2024-01-01 RX ADMIN — Medication 2 PUFF(S): at 17:06

## 2024-01-01 RX ADMIN — ONDANSETRON HYDROCHLORIDE 4 MILLIGRAM(S): 2 INJECTION INTRAMUSCULAR; INTRAVENOUS at 09:42

## 2024-01-01 RX ADMIN — MONTELUKAST SODIUM 10 MILLIGRAM(S): 10 TABLET, FILM COATED ORAL at 12:10

## 2024-01-01 RX ADMIN — HEPARIN SODIUM 5000 UNIT(S): 50 INJECTION, SOLUTION INTRAVENOUS at 05:20

## 2024-01-01 RX ADMIN — ONDANSETRON HYDROCHLORIDE 4 MILLIGRAM(S): 2 INJECTION INTRAMUSCULAR; INTRAVENOUS at 12:09

## 2024-01-01 RX ADMIN — MIDODRINE HYDROCHLORIDE 15 MILLIGRAM(S): 10 TABLET ORAL at 18:06

## 2024-01-01 RX ADMIN — POLYETHYLENE GLYCOL 3350 17 GRAM(S): 1 POWDER ORAL at 12:10

## 2024-01-01 RX ADMIN — HEPARIN SODIUM 5000 UNIT(S): 50 INJECTION, SOLUTION INTRAVENOUS at 07:17

## 2024-01-01 RX ADMIN — Medication 2 PUFF(S): at 07:13

## 2024-01-01 RX ADMIN — Medication 10 MILLIGRAM(S): at 11:23

## 2024-01-01 RX ADMIN — POLYETHYLENE GLYCOL 3350 17 GRAM(S): 1 POWDER ORAL at 11:24

## 2024-01-01 RX ADMIN — ONDANSETRON HYDROCHLORIDE 4 MILLIGRAM(S): 2 INJECTION INTRAMUSCULAR; INTRAVENOUS at 23:44

## 2024-01-01 RX ADMIN — POLYETHYLENE GLYCOL 3350 17 GRAM(S): 1 POWDER ORAL at 12:29

## 2024-01-01 RX ADMIN — ASPIRIN 81 MILLIGRAM(S): 325 TABLET, FILM COATED ORAL at 11:23

## 2024-01-01 RX ADMIN — PHENYLEPHRINE HYDROCHLORIDE 23.8 MICROGRAM(S)/KG/MIN: 10 INJECTION INTRAVENOUS at 17:26

## 2024-01-01 RX ADMIN — FLUTICASONE PROPIONATE 1 SPRAY(S): 50 SPRAY, METERED NASAL at 05:32

## 2024-01-01 RX ADMIN — FLUTICASONE PROPIONATE 1 SPRAY(S): 50 SPRAY, METERED NASAL at 17:29

## 2024-01-01 RX ADMIN — PHENYLEPHRINE HYDROCHLORIDE 23.8 MICROGRAM(S)/KG/MIN: 10 INJECTION INTRAVENOUS at 16:24

## 2024-01-01 RX ADMIN — FLUTICASONE PROPIONATE 1 SPRAY(S): 50 SPRAY, METERED NASAL at 17:05

## 2024-01-01 RX ADMIN — HEPARIN SODIUM 5000 UNIT(S): 50 INJECTION, SOLUTION INTRAVENOUS at 17:06

## 2024-01-01 RX ADMIN — FLUTICASONE PROPIONATE 1 SPRAY(S): 50 SPRAY, METERED NASAL at 07:50

## 2024-01-01 RX ADMIN — Medication 1 APPLICATION(S): at 06:56

## 2024-01-01 RX ADMIN — HEPARIN SODIUM 5000 UNIT(S): 50 INJECTION, SOLUTION INTRAVENOUS at 06:57

## 2024-01-01 RX ADMIN — Medication 10 MILLIGRAM(S): at 12:28

## 2024-01-01 RX ADMIN — MONTELUKAST SODIUM 10 MILLIGRAM(S): 10 TABLET, FILM COATED ORAL at 11:26

## 2024-01-01 RX ADMIN — Medication 1 APPLICATION(S): at 17:24

## 2024-01-01 RX ADMIN — Medication 1 APPLICATION(S): at 17:05

## 2024-01-01 RX ADMIN — Medication 1 APPLICATION(S): at 05:20

## 2024-01-01 RX ADMIN — MONTELUKAST SODIUM 10 MILLIGRAM(S): 10 TABLET, FILM COATED ORAL at 12:28

## 2024-01-01 RX ADMIN — Medication 50 MICROGRAM(S): at 05:32

## 2024-01-01 RX ADMIN — ONDANSETRON HYDROCHLORIDE 4 MILLIGRAM(S): 2 INJECTION INTRAMUSCULAR; INTRAVENOUS at 12:55

## 2024-02-27 NOTE — DIETITIAN INITIAL EVALUATION ADULT. - PERTINENT LABORATORY DATA
[FreeTextEntry1] : Mr. Mcknight is a 73 year old male with metastatic renal cell carcinoma. On initial presentation CT c/a/p and MRI abdomen showed multiple lung nodules, one liver lesion, retroperitoneal nodes. Bone scan showed one suspicious met in right superior pubic ramus. Liver lesion biopsy is consistent with renal cell carcinoma. It is not certain that the histology of RCC is clear cell vs non-clear cell. IMDC risk of this patient he has 5 out of 6 risks including leukocytosis, anemia, thrombocytosis, time from dx to initiate systemic treatment less than one year and hyperalcemia. He has poor risk of mRCC. Based on mccRCC the standard of care is combined immunotherapy nivo and ipi vs sutent, checkmate 214 in the intermediate and poor risk of mccRCC patients with significant overall survival benefit, keynote 426 keytruda plus axitinib vs sutent in good, intermediate and poor risk of mccRCC patients showed OS, PFS and MICHEL benefit, [place] in the intermediate and poor risk of mccRCC patients showed cabozantinib demonstrated a significant clinical benefit in PFS and MICHEL over standard-of-care sunitinib as first-line therapy in patients with intermediate- or poor-risk mRCC. Consider the longer duration of response and less side effects and recommended nivo and ipi. Completed 4 cycles of nivo and ipi, continued on maintenance nivolumab with interval scans to monitor disease. Of note, on diagnosis patient had L renal vein thrombus and was started on eliquis. He had an episode of gross hematuria leading to hospitalization in 9/2020 however etiology of hematuria is unclear and resolved. Follow up cystoscopy to r/o abnormality in the bladder showed no abnormality. Eliquis stopped during hospitalization with hematology consult. Remains off of eliquis. Scans have continued to show stability. Scans from 8/11/22 showed stable disease in L kidney and stable R renal cyst, noted non specific infiltration in omentum, cannot exclude carcinomatosis. CTs in November 2022 show stable disease. Patient also has large R inguinal hernia. March 2023, August 2023, Dec 2023 CT c/a/p showed stable disease in lungs, kidney and retroperitoneum.  Plan Metastatic RCC - continue maintenance nivolumab [id number], proceed with treatment today - 12/19/23 CT chest and AP: Multiple small bilateral renal neoplasms, at least 3 on the right and 2 on the left, not significantly changed from prior; similar omental nodularity - imaging [id number]  - continue to monitor blood work - monitor for iRAEs, reviewed with patient today; continues to tolerate treatment well  Instructed to contact our office with any new/worsening symptoms. Patient educated regarding plan of care, all questions/concerns addressed to the best of my abilities and patient's apparent satisfaction. RTC 4 weeks LOW: K 3.4, Cr 0.46.  HIGH: Glucose 119

## 2024-04-30 ENCOUNTER — INPATIENT (INPATIENT)
Facility: HOSPITAL | Age: 84
LOS: 9 days | Discharge: NURSING, REHAB OR LONG-TERM CARE | DRG: 871 | End: 2024-05-10
Attending: INTERNAL MEDICINE | Admitting: STUDENT IN AN ORGANIZED HEALTH CARE EDUCATION/TRAINING PROGRAM
Payer: MEDICARE

## 2024-04-30 VITALS
RESPIRATION RATE: 32 BRPM | OXYGEN SATURATION: 94 % | TEMPERATURE: 99 F | DIASTOLIC BLOOD PRESSURE: 62 MMHG | SYSTOLIC BLOOD PRESSURE: 101 MMHG | HEART RATE: 94 BPM

## 2024-04-30 DIAGNOSIS — Z98.891 HISTORY OF UTERINE SCAR FROM PREVIOUS SURGERY: Chronic | ICD-10-CM

## 2024-04-30 DIAGNOSIS — Z98.890 OTHER SPECIFIED POSTPROCEDURAL STATES: Chronic | ICD-10-CM

## 2024-04-30 DIAGNOSIS — Z96.612 PRESENCE OF LEFT ARTIFICIAL SHOULDER JOINT: Chronic | ICD-10-CM

## 2024-04-30 LAB
ALBUMIN SERPL ELPH-MCNC: 3.2 G/DL — LOW (ref 3.3–5)
ALP SERPL-CCNC: 120 U/L — SIGNIFICANT CHANGE UP (ref 40–120)
ALT FLD-CCNC: 56 U/L — HIGH (ref 10–45)
ANION GAP SERPL CALC-SCNC: 13 MMOL/L — SIGNIFICANT CHANGE UP (ref 5–17)
APPEARANCE UR: ABNORMAL
APTT BLD: 23.1 SEC — LOW (ref 24.5–35.6)
AST SERPL-CCNC: 83 U/L — HIGH (ref 10–40)
BACTERIA # UR AUTO: NEGATIVE /HPF — SIGNIFICANT CHANGE UP
BASE EXCESS BLDV CALC-SCNC: 2 MMOL/L — SIGNIFICANT CHANGE UP (ref -2–3)
BASOPHILS # BLD AUTO: 0.03 K/UL — SIGNIFICANT CHANGE UP (ref 0–0.2)
BASOPHILS NFR BLD AUTO: 0.2 % — SIGNIFICANT CHANGE UP (ref 0–2)
BILIRUB SERPL-MCNC: 0.5 MG/DL — SIGNIFICANT CHANGE UP (ref 0.2–1.2)
BILIRUB UR-MCNC: NEGATIVE — SIGNIFICANT CHANGE UP
BUN SERPL-MCNC: 40 MG/DL — HIGH (ref 7–23)
CA-I SERPL-SCNC: 1.17 MMOL/L — SIGNIFICANT CHANGE UP (ref 1.15–1.33)
CALCIUM SERPL-MCNC: 8.7 MG/DL — SIGNIFICANT CHANGE UP (ref 8.4–10.5)
CAST: 1 /LPF — SIGNIFICANT CHANGE UP (ref 0–4)
CHLORIDE BLDV-SCNC: 100 MMOL/L — SIGNIFICANT CHANGE UP (ref 96–108)
CHLORIDE SERPL-SCNC: 99 MMOL/L — SIGNIFICANT CHANGE UP (ref 96–108)
CO2 BLDV-SCNC: 28 MMOL/L — HIGH (ref 22–26)
CO2 SERPL-SCNC: 23 MMOL/L — SIGNIFICANT CHANGE UP (ref 22–31)
COLOR SPEC: YELLOW — SIGNIFICANT CHANGE UP
CREAT SERPL-MCNC: 0.77 MG/DL — SIGNIFICANT CHANGE UP (ref 0.5–1.3)
DIFF PNL FLD: NEGATIVE — SIGNIFICANT CHANGE UP
EGFR: 77 ML/MIN/1.73M2 — SIGNIFICANT CHANGE UP
EOSINOPHIL # BLD AUTO: 0 K/UL — SIGNIFICANT CHANGE UP (ref 0–0.5)
EOSINOPHIL NFR BLD AUTO: 0 % — SIGNIFICANT CHANGE UP (ref 0–6)
FLUAV AG NPH QL: SIGNIFICANT CHANGE UP
FLUBV AG NPH QL: SIGNIFICANT CHANGE UP
GAS PNL BLDV: 132 MMOL/L — LOW (ref 136–145)
GAS PNL BLDV: SIGNIFICANT CHANGE UP
GLUCOSE BLDV-MCNC: 174 MG/DL — HIGH (ref 70–99)
GLUCOSE SERPL-MCNC: 180 MG/DL — HIGH (ref 70–99)
GLUCOSE UR QL: NEGATIVE MG/DL — SIGNIFICANT CHANGE UP
HCO3 BLDV-SCNC: 26 MMOL/L — SIGNIFICANT CHANGE UP (ref 22–29)
HCT VFR BLD CALC: 31.7 % — LOW (ref 34.5–45)
HCT VFR BLDA CALC: 31 % — LOW (ref 34.5–46.5)
HGB BLD CALC-MCNC: 10.2 G/DL — LOW (ref 11.7–16.1)
HGB BLD-MCNC: 9.9 G/DL — LOW (ref 11.5–15.5)
IMM GRANULOCYTES NFR BLD AUTO: 0.6 % — SIGNIFICANT CHANGE UP (ref 0–0.9)
INR BLD: 1.18 RATIO — SIGNIFICANT CHANGE UP (ref 0.85–1.18)
KETONES UR-MCNC: NEGATIVE MG/DL — SIGNIFICANT CHANGE UP
LACTATE BLDV-MCNC: 1.3 MMOL/L — SIGNIFICANT CHANGE UP (ref 0.5–2)
LEUKOCYTE ESTERASE UR-ACNC: NEGATIVE — SIGNIFICANT CHANGE UP
LYMPHOCYTES # BLD AUTO: 0.78 K/UL — LOW (ref 1–3.3)
LYMPHOCYTES # BLD AUTO: 5 % — LOW (ref 13–44)
MCHC RBC-ENTMCNC: 28.3 PG — SIGNIFICANT CHANGE UP (ref 27–34)
MCHC RBC-ENTMCNC: 31.2 GM/DL — LOW (ref 32–36)
MCV RBC AUTO: 90.6 FL — SIGNIFICANT CHANGE UP (ref 80–100)
MONOCYTES # BLD AUTO: 1.3 K/UL — HIGH (ref 0–0.9)
MONOCYTES NFR BLD AUTO: 8.4 % — SIGNIFICANT CHANGE UP (ref 2–14)
NEUTROPHILS # BLD AUTO: 13.28 K/UL — HIGH (ref 1.8–7.4)
NEUTROPHILS NFR BLD AUTO: 85.8 % — HIGH (ref 43–77)
NITRITE UR-MCNC: NEGATIVE — SIGNIFICANT CHANGE UP
NRBC # BLD: 0 /100 WBCS — SIGNIFICANT CHANGE UP (ref 0–0)
PCO2 BLDV: 40 MMHG — SIGNIFICANT CHANGE UP (ref 39–42)
PH BLDV: 7.43 — SIGNIFICANT CHANGE UP (ref 7.32–7.43)
PH UR: 5 — SIGNIFICANT CHANGE UP (ref 5–8)
PLATELET # BLD AUTO: 124 K/UL — LOW (ref 150–400)
PO2 BLDV: 139 MMHG — HIGH (ref 25–45)
POTASSIUM BLDV-SCNC: 4.1 MMOL/L — SIGNIFICANT CHANGE UP (ref 3.5–5.1)
POTASSIUM SERPL-MCNC: 4.4 MMOL/L — SIGNIFICANT CHANGE UP (ref 3.5–5.3)
POTASSIUM SERPL-SCNC: 4.4 MMOL/L — SIGNIFICANT CHANGE UP (ref 3.5–5.3)
PROT SERPL-MCNC: 7.2 G/DL — SIGNIFICANT CHANGE UP (ref 6–8.3)
PROT UR-MCNC: 30 MG/DL
PROTHROM AB SERPL-ACNC: 12.3 SEC — SIGNIFICANT CHANGE UP (ref 9.5–13)
RBC # BLD: 3.5 M/UL — LOW (ref 3.8–5.2)
RBC # FLD: 15 % — HIGH (ref 10.3–14.5)
RBC CASTS # UR COMP ASSIST: 1 /HPF — SIGNIFICANT CHANGE UP (ref 0–4)
RSV RNA NPH QL NAA+NON-PROBE: SIGNIFICANT CHANGE UP
SAO2 % BLDV: 97.4 % — HIGH (ref 67–88)
SARS-COV-2 RNA SPEC QL NAA+PROBE: SIGNIFICANT CHANGE UP
SODIUM SERPL-SCNC: 135 MMOL/L — SIGNIFICANT CHANGE UP (ref 135–145)
SP GR SPEC: 1.02 — SIGNIFICANT CHANGE UP (ref 1–1.03)
SQUAMOUS # UR AUTO: 5 /HPF — SIGNIFICANT CHANGE UP (ref 0–5)
UROBILINOGEN FLD QL: 0.2 MG/DL — SIGNIFICANT CHANGE UP (ref 0.2–1)
WBC # BLD: 15.48 K/UL — HIGH (ref 3.8–10.5)
WBC # FLD AUTO: 15.48 K/UL — HIGH (ref 3.8–10.5)
WBC UR QL: 1 /HPF — SIGNIFICANT CHANGE UP (ref 0–5)

## 2024-04-30 PROCEDURE — 99285 EMERGENCY DEPT VISIT HI MDM: CPT

## 2024-04-30 RX ORDER — CEFTRIAXONE 500 MG/1
1000 INJECTION, POWDER, FOR SOLUTION INTRAMUSCULAR; INTRAVENOUS ONCE
Refills: 0 | Status: COMPLETED | OUTPATIENT
Start: 2024-04-30 | End: 2024-04-30

## 2024-04-30 RX ORDER — ACETAMINOPHEN 500 MG
975 TABLET ORAL ONCE
Refills: 0 | Status: COMPLETED | OUTPATIENT
Start: 2024-04-30 | End: 2024-04-30

## 2024-04-30 RX ADMIN — Medication 975 MILLIGRAM(S): at 21:09

## 2024-04-30 RX ADMIN — CEFTRIAXONE 100 MILLIGRAM(S): 500 INJECTION, POWDER, FOR SOLUTION INTRAMUSCULAR; INTRAVENOUS at 21:09

## 2024-04-30 NOTE — ED PROVIDER NOTE - NSICDXFAMILYHX_GEN_ALL_CORE_FT
not applicable/with patient
FAMILY HISTORY:  Father  Still living? No  Family history of CHF (congestive heart failure), Age at diagnosis: Age Unknown    Mother  Still living? Yes, Estimated age:   Family history of hypertension in mother, Age at diagnosis: Age Unknown

## 2024-04-30 NOTE — ED PROVIDER NOTE - ATTENDING APP SHARED VISIT CONTRIBUTION OF CARE
Jomar Naylor DO: I have personally performed a face to face medical and diagnostic evaluation of the patient. I have discussed with and reviewed the Resident's and/or ACP's and/or Medical/PA/NP student's note and agree with the History, ROS, Physical Exam and MDM unless otherwise indicated. A brief summary of my personal evaluation and impression can be found below.     83 female history of COPD not on O2, obesity, HTN, CHF presents with SOB.  Patient brought in by ambulance from [facility] where she has had increased work of breathing for 1 day.  Recently had cough and shortness of breath diagnosed with left-sided pneumonia, has been getting IV antibiotics at the facility.  Patient reports shortness of breath acutely worsening this evening.  Patient placed on O2 by EMS satting 95% on 4 LNC.  Given DuoNeb X1.  Patient also reports bilateral sharp chest pain.  Recently treated for UTI in February and March.  Denies chest pressure or radiating to back or shoulders.  Reports occasional generalized right-sided abdominal pain.  Denies fever, nausea, vomiting, diarrhea, dark or bloody stools, urinary symptoms.    CONSTITUTIONAL: NAD  SKIN: Warm dry, normal skin turgor  HEAD: NCAT  CARD: RRR  RESP: tachypneic on 6L NC  ABD: non-distended  MSK: Full ROM, no leg swelling  PSYCH: Cooperative, appropriate.     sepsis w. increased o2 demand likely due to pna vs cod exacerbation will get xr/ct to evaluate, intraabd cause of fevers possible will eval w/ ct given abdominal pain especially ruq and rlq pain will get ct to evaluate, will initiate sepsis treatment incl blood cultures, pt tba dispo pending imaging.

## 2024-04-30 NOTE — ED ADULT NURSE NOTE - NSFALLRISKINTERV_ED_ALL_ED
Assistance with ambulation/Communicate fall risk and risk factors to all staff, patient, and family/Provide visual cue: yellow wristband, yellow gown, etc/Reinforce activity limits and safety measures with patient and family/Call bell, personal items and telephone in reach/Instruct patient to call for assistance before getting out of bed/chair/stretcher/Non-slip footwear applied when patient is off stretcher/[place] to call system/Physically safe environment - no spills, clutter or unnecessary equipment/Purposeful Proactive Rounding/Room/bathroom lighting operational, light cord in reach

## 2024-04-30 NOTE — ED ADULT NURSE NOTE - HIV OFFER
CHF (congestive heart failure)    DM (diabetes mellitus)    High cholesterol    HTN (hypertension)    Prostate CA    Pulmonary hypertension    Renal insufficiency    Sleep apnea Opt out

## 2024-04-30 NOTE — ED ADULT NURSE NOTE - NSICDXFAMILYHX_GEN_ALL_CORE_FT
FAMILY HISTORY:  Father  Still living? No  Family history of CHF (congestive heart failure), Age at diagnosis: Age Unknown    Mother  Still living? Yes, Estimated age:   Family history of hypertension in mother, Age at diagnosis: Age Unknown

## 2024-04-30 NOTE — ED PROVIDER NOTE - NSICDXPASTSURGICALHX_GEN_ALL_CORE_FT
PAST SURGICAL HISTORY:  H/O total shoulder replacement, left     H/O:      History of carpal tunnel release L wrist

## 2024-04-30 NOTE — ED ADULT NURSE NOTE - OBJECTIVE STATEMENT
83 y.o female BIBEMS from [facility] for SOB x 1 day. Endorses recent diagnose of PNA and UTI, receiving IV antibiotics at facility for PNA. Pt arrived on 4L NC (not on O2 at baseline), received Duonebx1 via EMS. C/o CP. Denies fever, NV, HA, dizziness,  symptoms. PMH COPD, HTN and CHF

## 2024-04-30 NOTE — ED PROVIDER NOTE - OBJECTIVE STATEMENT
83 female history of COPD not on O2, obesity, HTN, CHF presents with SOB.  Patient brought in by ambulance from [facility] where she has had increased work of breathing for 1 day.  Recently had cough and shortness of breath diagnosed with left-sided pneumonia, has been getting IV antibiotics at the facility.  Patient reports shortness of breath acutely worsening this evening.  Patient placed on O2 by EMS satting 95% on 4 LNC.  Given DuoNeb X1.  Patient also reports bilateral sharp chest pain.  Recently treated for UTI in February and March.  Denies chest pressure or radiating to back or shoulders.  Reports occasional generalized right-sided abdominal pain.  Denies fever, nausea, vomiting, diarrhea, dark or bloody stools, urinary symptoms.

## 2024-04-30 NOTE — ED PROVIDER NOTE - PROGRESS NOTE DETAILS
ct revealing for perforated dimple w/collection possible abscess. pt HD stable. spoke with gen surg resident for consult, will see pt in ED - Daryl Cochran PA-C no surgical intervention planned. recommends IR consult, MRCP to clarify if findings are perforated GB vs necrotic mass. pt has no known oncologic history. stable for admit - Daryl Cochran PA-C

## 2024-04-30 NOTE — ED PROVIDER NOTE - NSICDXPASTMEDICALHX_GEN_ALL_CORE_FT
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

## 2024-05-01 ENCOUNTER — TRANSCRIPTION ENCOUNTER (OUTPATIENT)
Age: 84
End: 2024-05-01

## 2024-05-01 DIAGNOSIS — L40.50 ARTHROPATHIC PSORIASIS, UNSPECIFIED: ICD-10-CM

## 2024-05-01 DIAGNOSIS — J44.1 CHRONIC OBSTRUCTIVE PULMONARY DISEASE WITH (ACUTE) EXACERBATION: ICD-10-CM

## 2024-05-01 DIAGNOSIS — A41.9 SEPSIS, UNSPECIFIED ORGANISM: ICD-10-CM

## 2024-05-01 DIAGNOSIS — K82.2 PERFORATION OF GALLBLADDER: ICD-10-CM

## 2024-05-01 DIAGNOSIS — I10 ESSENTIAL (PRIMARY) HYPERTENSION: ICD-10-CM

## 2024-05-01 DIAGNOSIS — Z29.9 ENCOUNTER FOR PROPHYLACTIC MEASURES, UNSPECIFIED: ICD-10-CM

## 2024-05-01 DIAGNOSIS — R65.10 SYSTEMIC INFLAMMATORY RESPONSE SYNDROME (SIRS) OF NON-INFECTIOUS ORIGIN WITHOUT ACUTE ORGAN DYSFUNCTION: ICD-10-CM

## 2024-05-01 DIAGNOSIS — C56.1 MALIGNANT NEOPLASM OF RIGHT OVARY: ICD-10-CM

## 2024-05-01 DIAGNOSIS — R06.00 DYSPNEA, UNSPECIFIED: ICD-10-CM

## 2024-05-01 DIAGNOSIS — I44.1 ATRIOVENTRICULAR BLOCK, SECOND DEGREE: ICD-10-CM

## 2024-05-01 PROCEDURE — 74183 MRI ABD W/O CNTR FLWD CNTR: CPT | Mod: 26

## 2024-05-01 PROCEDURE — 99223 1ST HOSP IP/OBS HIGH 75: CPT | Mod: GC

## 2024-05-01 PROCEDURE — 72196 MRI PELVIS W/DYE: CPT | Mod: 26

## 2024-05-01 PROCEDURE — 78830 RP LOCLZJ TUM SPECT W/CT 1: CPT | Mod: 26

## 2024-05-01 PROCEDURE — 99221 1ST HOSP IP/OBS SF/LOW 40: CPT | Mod: GC

## 2024-05-01 PROCEDURE — 74177 CT ABD & PELVIS W/CONTRAST: CPT | Mod: 26,MC

## 2024-05-01 PROCEDURE — 78227 HEPATOBIL SYST IMAGE W/DRUG: CPT | Mod: 26

## 2024-05-01 PROCEDURE — 71275 CT ANGIOGRAPHY CHEST: CPT | Mod: 26,MC

## 2024-05-01 RX ORDER — SODIUM CHLORIDE 9 MG/ML
1000 INJECTION, SOLUTION INTRAVENOUS
Refills: 0 | Status: DISCONTINUED | OUTPATIENT
Start: 2024-05-01 | End: 2024-05-02

## 2024-05-01 RX ORDER — LORATADINE 10 MG/1
10 TABLET ORAL DAILY
Refills: 0 | Status: DISCONTINUED | OUTPATIENT
Start: 2024-05-01 | End: 2024-05-10

## 2024-05-01 RX ORDER — GABAPENTIN 400 MG/1
800 CAPSULE ORAL EVERY 12 HOURS
Refills: 0 | Status: DISCONTINUED | OUTPATIENT
Start: 2024-05-01 | End: 2024-05-10

## 2024-05-01 RX ORDER — SOLIFENACIN SUCCINATE 10 MG/1
1 TABLET ORAL
Qty: 0 | Refills: 0 | DISCHARGE

## 2024-05-01 RX ORDER — SENNA PLUS 8.6 MG/1
2 TABLET ORAL AT BEDTIME
Refills: 0 | Status: DISCONTINUED | OUTPATIENT
Start: 2024-05-01 | End: 2024-05-10

## 2024-05-01 RX ORDER — IPRATROPIUM/ALBUTEROL SULFATE 18-103MCG
3 AEROSOL WITH ADAPTER (GRAM) INHALATION ONCE
Refills: 0 | Status: COMPLETED | OUTPATIENT
Start: 2024-05-01 | End: 2024-05-01

## 2024-05-01 RX ORDER — OXYCODONE HYDROCHLORIDE 5 MG/1
5 TABLET ORAL EVERY 4 HOURS
Refills: 0 | Status: DISCONTINUED | OUTPATIENT
Start: 2024-05-01 | End: 2024-05-03

## 2024-05-01 RX ORDER — POLYETHYLENE GLYCOL 3350 17 G/17G
17 POWDER, FOR SOLUTION ORAL DAILY
Refills: 0 | Status: DISCONTINUED | OUTPATIENT
Start: 2024-05-01 | End: 2024-05-07

## 2024-05-01 RX ORDER — LEVOTHYROXINE SODIUM 125 MCG
50 TABLET ORAL DAILY
Refills: 0 | Status: DISCONTINUED | OUTPATIENT
Start: 2024-05-01 | End: 2024-05-10

## 2024-05-01 RX ORDER — GABAPENTIN 400 MG/1
1 CAPSULE ORAL
Qty: 0 | Refills: 0 | DISCHARGE

## 2024-05-01 RX ORDER — PIPERACILLIN AND TAZOBACTAM 4; .5 G/20ML; G/20ML
3.38 INJECTION, POWDER, LYOPHILIZED, FOR SOLUTION INTRAVENOUS ONCE
Refills: 0 | Status: COMPLETED | OUTPATIENT
Start: 2024-05-01 | End: 2024-05-01

## 2024-05-01 RX ORDER — ACETAMINOPHEN 500 MG
650 TABLET ORAL EVERY 6 HOURS
Refills: 0 | Status: DISCONTINUED | OUTPATIENT
Start: 2024-05-01 | End: 2024-05-10

## 2024-05-01 RX ORDER — ASPIRIN/CALCIUM CARB/MAGNESIUM 324 MG
81 TABLET ORAL DAILY
Refills: 0 | Status: DISCONTINUED | OUTPATIENT
Start: 2024-05-01 | End: 2024-05-01

## 2024-05-01 RX ORDER — SULFASALAZINE 500 MG
2 TABLET ORAL
Qty: 0 | Refills: 0 | DISCHARGE

## 2024-05-01 RX ORDER — ACETAMINOPHEN 500 MG
2 TABLET ORAL
Qty: 0 | Refills: 0 | DISCHARGE

## 2024-05-01 RX ORDER — PIPERACILLIN AND TAZOBACTAM 4; .5 G/20ML; G/20ML
3.38 INJECTION, POWDER, LYOPHILIZED, FOR SOLUTION INTRAVENOUS EVERY 8 HOURS
Refills: 0 | Status: DISCONTINUED | OUTPATIENT
Start: 2024-05-01 | End: 2024-05-05

## 2024-05-01 RX ORDER — ROSUVASTATIN CALCIUM 5 MG/1
1 TABLET ORAL
Qty: 0 | Refills: 0 | DISCHARGE

## 2024-05-01 RX ORDER — IBUPROFEN 200 MG
1 TABLET ORAL
Qty: 0 | Refills: 0 | DISCHARGE

## 2024-05-01 RX ORDER — METOPROLOL TARTRATE 50 MG
0.5 TABLET ORAL
Refills: 0 | DISCHARGE

## 2024-05-01 RX ORDER — METOPROLOL TARTRATE 50 MG
12.5 TABLET ORAL EVERY 12 HOURS
Refills: 0 | Status: DISCONTINUED | OUTPATIENT
Start: 2024-05-01 | End: 2024-05-01

## 2024-05-01 RX ORDER — ASCORBIC ACID 60 MG
1 TABLET,CHEWABLE ORAL
Qty: 0 | Refills: 0 | DISCHARGE

## 2024-05-01 RX ADMIN — PIPERACILLIN AND TAZOBACTAM 25 GRAM(S): 4; .5 INJECTION, POWDER, LYOPHILIZED, FOR SOLUTION INTRAVENOUS at 10:28

## 2024-05-01 RX ADMIN — PIPERACILLIN AND TAZOBACTAM 25 GRAM(S): 4; .5 INJECTION, POWDER, LYOPHILIZED, FOR SOLUTION INTRAVENOUS at 16:17

## 2024-05-01 RX ADMIN — PIPERACILLIN AND TAZOBACTAM 200 GRAM(S): 4; .5 INJECTION, POWDER, LYOPHILIZED, FOR SOLUTION INTRAVENOUS at 07:24

## 2024-05-01 RX ADMIN — Medication 0.5 MILLIGRAM(S): at 19:57

## 2024-05-01 RX ADMIN — PIPERACILLIN AND TAZOBACTAM 25 GRAM(S): 4; .5 INJECTION, POWDER, LYOPHILIZED, FOR SOLUTION INTRAVENOUS at 22:54

## 2024-05-01 RX ADMIN — SENNA PLUS 2 TABLET(S): 8.6 TABLET ORAL at 22:47

## 2024-05-01 RX ADMIN — SODIUM CHLORIDE 100 MILLILITER(S): 9 INJECTION, SOLUTION INTRAVENOUS at 09:54

## 2024-05-01 RX ADMIN — POLYETHYLENE GLYCOL 3350 17 GRAM(S): 17 POWDER, FOR SOLUTION ORAL at 18:15

## 2024-05-01 RX ADMIN — GABAPENTIN 800 MILLIGRAM(S): 400 CAPSULE ORAL at 22:48

## 2024-05-01 RX ADMIN — Medication 3 MILLILITER(S): at 03:56

## 2024-05-01 RX ADMIN — LORATADINE 10 MILLIGRAM(S): 10 TABLET ORAL at 18:15

## 2024-05-01 NOTE — H&P ADULT - PROBLEM SELECTOR PLAN 1
Meets SIRS for sepsis (leukocytosis, tachypnea, temperature, HR), unclear if true infection vs malignancy  -s/p CTX in ED, c/w this and metronidazole for anaerobes  -f/u blood and urine Cx Meets SIRS for sepsis (leukocytosis, tachypnea, temperature, HR), unclear if true infection from GB changes vs malignancy  -s/p CTX in ED, c/w this and metronidazole for anaerobes  -f/u blood and urine Cx  -defer fluid resuscitation  -trend WBC and fever curve Meets SIRS for sepsis (leukocytosis, tachypnea, temperature, HR), unclear if true infection from GB changes vs malignancy  -s/p CTX in ED, c/w Zosyn and de-escalate as able  -f/u blood and urine Cx  -IVF with 100 cc LR x 10 hours  -trend WBC and fever curve Meets SIRS for sepsis (leukocytosis, tachypnea, temperature, HR) 2/2 acute dimple c/b perforation  - c/w Zosyn   -f/u blood and urine Cx  -IVF with 100 cc LR x 10 hours for now. reevaluated vital signs after 1L.  -trend WBC and fever curve

## 2024-05-01 NOTE — PATIENT PROFILE ADULT - FALL HARM RISK - RISK INTERVENTIONS

## 2024-05-01 NOTE — H&P ADULT - ATTENDING COMMENTS
Pt was seen and examined during key portion of E/M service. Case discussed with resident. H&P reviewed and edited where appropriate. Other than the following, I agree with the above history, exam, assessment, and plan.  83 female history of COPD not on O2, morbid obesity, HTN, CHF, BCC, hypothyroidism, psoriatic arthritis, SUZIE, currently on abx for presumed PNA, pw perforated gallbladder, cystic lesion of liver, and poss right adenexal malignancy.  MRI a/p pending. IR consulted for perc dimple tube. may also need IR reeval if liver lesion is abscess by MRI. empiric zosyn. oxycodone PRN for pain. f/u surg recs. NPO for now holding antihypertensives.

## 2024-05-01 NOTE — PROGRESS NOTE ADULT - PROBLEM SELECTOR PLAN 2
CT findings c/f cholelithiasis with perforation and ?absccess  -surg on board, appreciate recs; should clarify whether findings in pelvis and abdomen could be sequela of biliary pathology  -f/u MRCP/MR abdomen  -NPO for now, may ADAT pending imaging  -IR on board, appreciate recs regarding necessity for percutaneous cholecystostomy vs abscess drainage  -ABx coverage as above CT findings c/f cholelithiasis with perforation and ?absccess    - F/u HIDA scan  - pt tentatively scheduled for percutaneous cholecystostomy tube w/ IR if HIDA scan positive  - gen surg consulted -> recommend further MRI abd/pelvis for eval. not a surgical candidate at this time   -f/u MRCP/MR abdomen  -ABx coverage as above

## 2024-05-01 NOTE — CONSULT NOTE ADULT - ASSESSMENT
83y Female PMH CAD, SCC, HTN, COPD, SUZIE, Asp PNA, hypothyroidism, CAD, presents to ED from [facility] with SOB for 1 week. In the ED patient is on 4LNC saturation 94%. 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 Moderate volume ascites. Trace left pleural effusion. Abd exam with mild TTP diffusely.    Plan:  - no acute surgical intervention  - would recommend f/u Abd MRI/MRCP to further evaluate hepatic collection vs metastasis  - If MRI + for abscess from perforated GB IR consult for drainage  - would recommend medical admission for multiple other medical comorbidities, COPD exacerbation not excluded as source of dyspnea  - Consider Gyn/onc vs oncology consult for ovarian neoplasm/carcinomatosis    Discussed with Dr. Simon  Trauma/ACS #[id number]

## 2024-05-01 NOTE — CONSULT NOTE ADULT - SUBJECTIVE AND OBJECTIVE BOX
Surgery Consult Note  Attending: Alfred  Service: Trauma/ACS #[id number]   p      HPI: 83y Female PMH CAD, SCC, HTN, COPD, SUZIE, Asp PNA, hypothyroidism, CAD, presents to ED from [facility] with SOB for 1 week. Patient reports she was nauseas and had GERD symptoms last week then suddenly was short of breath. Her facility assumed she had asp PNA due to her previous history of asp while on her bipap machine and started her on zosyn. The SOB did not improve and her family urged her to present to ED. Patient denies emesis recently. No fevers. No abd pain at rest. In the ED patient is on 4LNC saturation 94%. 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 Moderate volume ascites. Trace left pleural effusion.       PAST MEDICAL HISTORY:  PAST MEDICAL & SURGICAL HISTORY:  Hypothyroid      Chronic bronchitis      Psoriatic arthritis      Osteoarthritis      Overactive bladder      Basal cell carcinoma  LLE s/p Moh's excision      Squamous cell carcinoma  LLE s/p Moh's excision      History of carpal tunnel release  L wrist      H/O total shoulder replacement, left      H/O:           ALLERGIES:  Allergies    latex (Rash)  erythromycin (Rash)  phenobarbital (Rash)    Intolerances    Milk (Rash)      SOCIAL HISTORY: Negative for tobacco, etoh, or drug use    FAMILY HISTORY:  FAMILY HISTORY:  Family history of CHF (congestive heart failure) (Father)  Father,  47y/o    Family history of hypertension in mother (Mother)        PHYSICAL EXAM:  General: NAD, resting comfortably  HEENT: NC/AT, EOMI, normal hearing, no oral lesions, no LAD, neck supple  Pulmonary: normal resp effort, CTA-B  Cardiovascular: NSR, no murmurs  Abdominal: soft, morbidly obese abd, ND/mildly TTP, no organomegaly, no guarding or rebound tenderness  Extremities: WWP, normal strength, no clubbing/cyanosis/edema  Neuro: A/O x 3, CNs II-XII grossly intact, normal sensation, no focal deficits      VITAL SIGNS:  Vital Signs Last 24 Hrs  T(C): 36.9 (01 May 2024 01:46), Max: 38.4 (2024 20:55)  T(F): 98.5 (01 May 2024 01:46), Max: 101.2 (2024 20:55)  HR: 77 (01 May 2024 01:46) (77 - 94)  BP: 113/55 (01 May 2024 01:46) (101/62 - 115/58)  BP(mean): 72 (01 May 2024 01:46) (67 - 73)  RR: 25 (01 May 2024 01:46) (25 - 32)  SpO2: 98% (01 May 2024 01:46) (94% - 98%)    Parameters below as of 01 May 2024 01:46  Patient On (Oxygen Delivery Method): nasal cannula  O2 Flow (L/min): 4      I&O's Summary      LABS:                        9.9    15.48 )-----------( 124      ( 2024 21:30 )             31.7     0430    135  |  99  |  40<H>  ----------------------------<  180<H>  4.4   |  23  |  0.77    Ca    8.7      2024 21:30    TPro  7.2  /  Alb  3.2<L>  /  TBili  0.5  /  DBili  x   /  AST  83<H>  /  ALT  56<H>  /  AlkPhos  120  30    PT/INR - ( 2024 21:30 )   PT: 12.3 sec;   INR: 1.18 ratio         PTT - ( 2024 21:30 )  PTT:23.1 sec  Urinalysis Basic - ( 2024 23:24 )    Color: Yellow / Appearance: Cloudy / S.022 / pH: x  Gluc: x / Ketone: Negative mg/dL  / Bili: Negative / Urobili: 0.2 mg/dL   Blood: x / Protein: 30 mg/dL / Nitrite: Negative   Leuk Esterase: Negative / RBC: 1 /HPF / WBC 1 /HPF   Sq Epi: x / Non Sq Epi: 5 /HPF / Bacteria: Negative /HPF      CAPILLARY BLOOD GLUCOSE        LIVER FUNCTIONS - ( 2024 21:30 )  Alb: 3.2 g/dL / Pro: 7.2 g/dL / ALK PHOS: 120 U/L / ALT: 56 U/L / AST: 83 U/L / GGT: x             CULTURES:      RADIOLOGY & ADDITIONAL STUDIES:  < from: CT Abdomen and Pelvis w/ IV Cont (24 @ 00:34) >    ACC: [id number] EXAM:  CT ABDOMEN AND PELVIS IC   ORDERED BY: CHINO MCGRATH     ACC: [id number] EXAM:  CT ANGIO CHEST PULM ART WAWIC   ORDERED BY: CHINO MCGRATH     PROCEDURE DATE:  2024          INTERPRETATION:  CLINICAL INFORMATION: Shortness of breath, hypoxia,   evaluate for PE, abdominal pains and fever, evaluate abdomen for pathology    COMPARISON: Renal ultrasound 2011.    CONTRAST/COMPLICATIONS:  IV Contrast: Omnipaque 350 (accession [id number]), IV contrast documented  in unlinked concurrent exam (accession [id number])  90 cc administered   10   cc discarded  Oral Contrast: NONE  Complications: None reported at time of study completion    PROCEDURE:  CT Angiography of the Chest was performed followed by portal venous phase   imaging of the Abdomen and Pelvis.  Sagittal and coronal reformats were performed as well as 3D (MIP)   reconstructions.    FINDINGS:  CHEST:  LUNGS AND LARGE AIRWAYS: Patent central airways. No pneumonia or edema.   Dependent atelectatic changes most prominent posterior aspect on the left.  PLEURA: Trace right and small left-sided pleural effusion.  VESSELS: No pulmonary embolus. No thoracic aortic aneurysm.   Atherosclerosis including of the coronary arteries.  HEART: Mild cardiomegaly. No pericardial effusion. Mitral annular   calcifications.  MEDIASTINUM AND BRANDEN: No lymphadenopathy.  CHEST WALL AND LOWER NECK: Unremarkable.    ABDOMEN AND PELVIS:  GALLBLADDER: Several gallstones in the gallbladder including extending   toward the neck. Concentric edematous gallbladder wall thickening.   Apparent small focal discontinuity of the fundal wall the gallbladder   (304, 43 and 604, 67). The gallbladder contents appear to extend through   this defect in the gallbladder fundal wall,communicating with a small   amount of pericholecystic fluid, and with a 2.7 x 3.6 x 3.2 cm TRV X AP X   CC irregular in contour complex low density mass in the overlying right   lobe of liver.  LIVER: 1.4 cm not well-defined hypodensity in the caudate lobe. Better   defined 1 cm small cystic lesion more anteriorly in the right lobe. Both   visible on image 34 of series 304. A few other scattered tiny   hypodensities.  BILE DUCTS: Normal caliber.  SPLEEN: Within normal limits.  PANCREAS: Within normal limits.  ADRENALS: Within normal limits.  KIDNEYS/URETERS: No hydronephrosis or renal stone.    BLADDER: Within normal limits.  REPRODUCTIVE ORGANS: 4.6 cm in greatest dimension complex cystic mass   expands the left ovary. The right ovary is not identified separate from   numerous soft tissue density and smaller calcific densities in the right   adnexa with surrounding ascitic fluid. Endometrium thickened for a   postmenopausal patient, 1.5 cm AP.    BOWEL: No bowel obstruction. Appendix not identified, no evidence of   appendicitis.  PERITONEUM: Moderate volume ascites. Soft tissue densities within the   greater omentum (304:91) Mixed attenuating right paracolic densities with   associated retroperitoneal fat (304:83-93). Suspect additional nodularity   along the right posterior peritoneal lining (304:85), as well as similar   lesions in the pelvic, difficult to separate from bowel loops.  VESSELS: Mild atherosclerosis. No abdominal aortic aneurysm.  RETROPERITONEUM/LYMPH NODES: Mild bilateral external iliac   lymphadenopathy.  ABDOMINAL WALL: Small fat-containing umbilical hernia. Soft tissue edema   involving the left anterolateral abdominal wall without focal collection.  BONES: Degenerative changes. No lytic or blastic lesions. Left shoulder   total arthroplasty. Chronic healed fracture of the sternal body.    IMPRESSION:  Findings concerning for perforation of the fundal wall of the   gallbladder, communicating with a small amount of pericholecystic fluid,   and with a3.6 cm in greatest dimension complex cystic lesion in the   overlying hepatic parenchyma most likely an abscess.    The presence of gallstones and edematous wall thickening of the   gallbladder suggests that cholecystitis is the most likely cause forthis   perforation. Neoplasm less likely but possible.    Other smaller hepatic hypodensities may represent incidental cysts   although, given the probable carcinomatosis and right ovarian lesion   described below, metastases are also possible.    MRI abdomen with and without gadolinium and with MRCP would more   definitively image these gallbladder and hepatic abnormalities. Given the   patient's body habitus, ultrasound would probably not provide much more   information.    Several soft tissue density nodules in the omentum and peritoneal fat   most likely represents carcinomatosis. The most likely source is right   ovarian neoplasm; the right ovary is not identified separate from   numerous soft tissue and smaller calcific densities in theright adnexa   with surrounding ascitic fluid.    GYN protocol MRI pelvis with and without gadolinium would best evaluate   for right ovarian neoplasm.    Moderate volume simple density ascites.    No pulmonary embolism.    Small left and trace rightpleural effusions.            --- End of Report ---    < end of copied text >                 Surgery Consult Note  Attending: Alfred  Service: Trauma/ACS #[id number]   p      HPI: 83y Female PMH CAD, SCC, HTN, COPD, SUZIE, Asp PNA, hypothyroidism, CAD, presents to ED from [facility] with SOB for 1 week. Patient reports she was nauseas and had GERD symptoms last week then suddenly was short of breath. Her facility assumed she had asp PNA due to her previous history of asp while on her bipap machine and started her on zosyn. The SOB did not improve and her family urged her to present to ED. Patient denies emesis recently. No fevers. No abd pain at rest. In the ED patient is on 4LNC saturation 94%, febrile to 101, BP stable. 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 Moderate volume ascites. Trace left pleural effusion.       PAST MEDICAL HISTORY:  PAST MEDICAL & SURGICAL HISTORY:  Hypothyroid      Chronic bronchitis      Psoriatic arthritis      Osteoarthritis      Overactive bladder      Basal cell carcinoma  LLE s/p Moh's excision      Squamous cell carcinoma  LLE s/p Moh's excision      History of carpal tunnel release  L wrist      H/O total shoulder replacement, left      H/O:           ALLERGIES:  Allergies    latex (Rash)  erythromycin (Rash)  phenobarbital (Rash)    Intolerances    Milk (Rash)      SOCIAL HISTORY: Negative for tobacco, etoh, or drug use    FAMILY HISTORY:  FAMILY HISTORY:  Family history of CHF (congestive heart failure) (Father)  Father,  47y/o    Family history of hypertension in mother (Mother)        PHYSICAL EXAM:  General: NAD, resting comfortably  HEENT: NC/AT, EOMI, normal hearing, no oral lesions, no LAD, neck supple  Pulmonary: normal resp effort, CTA-B  Cardiovascular: NSR, no murmurs  Abdominal: soft, morbidly obese abd, ND/mildly TTP, no organomegaly, no guarding or rebound tenderness  Extremities: WWP, normal strength, no clubbing/cyanosis/edema  Neuro: A/O x 3, CNs II-XII grossly intact, normal sensation, no focal deficits      VITAL SIGNS:  Vital Signs Last 24 Hrs  T(C): 36.9 (01 May 2024 01:46), Max: 38.4 (2024 20:55)  T(F): 98.5 (01 May 2024 01:46), Max: 101.2 (2024 20:55)  HR: 77 (01 May 2024 01:46) (77 - 94)  BP: 113/55 (01 May 2024 01:46) (101/62 - 115/58)  BP(mean): 72 (01 May 2024 01:46) (67 - 73)  RR: 25 (01 May 2024 01:46) (25 - 32)  SpO2: 98% (01 May 2024 01:46) (94% - 98%)    Parameters below as of 01 May 2024 01:46  Patient On (Oxygen Delivery Method): nasal cannula  O2 Flow (L/min): 4      I&O's Summary      LABS:                        9.9    15.48 )-----------( 124      ( 2024 21:30 )             31.7     0430    135  |  99  |  40<H>  ----------------------------<  180<H>  4.4   |  23  |  0.77    Ca    8.7      2024 21:30    TPro  7.2  /  Alb  3.2<L>  /  TBili  0.5  /  DBili  x   /  AST  83<H>  /  ALT  56<H>  /  AlkPhos  120  04-30    PT/INR - ( 2024 21:30 )   PT: 12.3 sec;   INR: 1.18 ratio         PTT - ( 2024 21:30 )  PTT:23.1 sec  Urinalysis Basic - ( 2024 23:24 )    Color: Yellow / Appearance: Cloudy / S.022 / pH: x  Gluc: x / Ketone: Negative mg/dL  / Bili: Negative / Urobili: 0.2 mg/dL   Blood: x / Protein: 30 mg/dL / Nitrite: Negative   Leuk Esterase: Negative / RBC: 1 /HPF / WBC 1 /HPF   Sq Epi: x / Non Sq Epi: 5 /HPF / Bacteria: Negative /HPF      CAPILLARY BLOOD GLUCOSE        LIVER FUNCTIONS - ( 2024 21:30 )  Alb: 3.2 g/dL / Pro: 7.2 g/dL / ALK PHOS: 120 U/L / ALT: 56 U/L / AST: 83 U/L / GGT: x             CULTURES:      RADIOLOGY & ADDITIONAL STUDIES:  < from: CT Abdomen and Pelvis w/ IV Cont (24 @ 00:34) >    ACC: [id number] EXAM:  CT ABDOMEN AND PELVIS IC   ORDERED BY: CHINO MCGRATH     ACC: [id number] EXAM:  CT ANGIO CHEST PULM ART WAWIC   ORDERED BY: CHINO MCGRATH     PROCEDURE DATE:  2024          INTERPRETATION:  CLINICAL INFORMATION: Shortness of breath, hypoxia,   evaluate for PE, abdominal pains and fever, evaluate abdomen for pathology    COMPARISON: Renal ultrasound 2011.    CONTRAST/COMPLICATIONS:  IV Contrast: Omnipaque 350 (accession [id number]), IV contrast documented  in unlinked concurrent exam (accession [id number])  90 cc administered   10   cc discarded  Oral Contrast: NONE  Complications: None reported at time of study completion    PROCEDURE:  CT Angiography of the Chest was performed followed by portal venous phase   imaging of the Abdomen and Pelvis.  Sagittal and coronal reformats were performed as well as 3D (MIP)   reconstructions.    FINDINGS:  CHEST:  LUNGS AND LARGE AIRWAYS: Patent central airways. No pneumonia or edema.   Dependent atelectatic changes most prominent posterior aspect on the left.  PLEURA: Trace right and small left-sided pleural effusion.  VESSELS: No pulmonary embolus. No thoracic aortic aneurysm.   Atherosclerosis including of the coronary arteries.  HEART: Mild cardiomegaly. No pericardial effusion. Mitral annular   calcifications.  MEDIASTINUM AND BRANDEN: No lymphadenopathy.  CHEST WALL AND LOWER NECK: Unremarkable.    ABDOMEN AND PELVIS:  GALLBLADDER: Several gallstones in the gallbladder including extending   toward the neck. Concentric edematous gallbladder wall thickening.   Apparent small focal discontinuity of the fundal wall the gallbladder   (304, 43 and 604, 67). The gallbladder contents appear to extend through   this defect in the gallbladder fundal wall,communicating with a small   amount of pericholecystic fluid, and with a 2.7 x 3.6 x 3.2 cm TRV X AP X   CC irregular in contour complex low density mass in the overlying right   lobe of liver.  LIVER: 1.4 cm not well-defined hypodensity in the caudate lobe. Better   defined 1 cm small cystic lesion more anteriorly in the right lobe. Both   visible on image 34 of series 304. A few other scattered tiny   hypodensities.  BILE DUCTS: Normal caliber.  SPLEEN: Within normal limits.  PANCREAS: Within normal limits.  ADRENALS: Within normal limits.  KIDNEYS/URETERS: No hydronephrosis or renal stone.    BLADDER: Within normal limits.  REPRODUCTIVE ORGANS: 4.6 cm in greatest dimension complex cystic mass   expands the left ovary. The right ovary is not identified separate from   numerous soft tissue density and smaller calcific densities in the right   adnexa with surrounding ascitic fluid. Endometrium thickened for a   postmenopausal patient, 1.5 cm AP.    BOWEL: No bowel obstruction. Appendix not identified, no evidence of   appendicitis.  PERITONEUM: Moderate volume ascites. Soft tissue densities within the   greater omentum (304:91) Mixed attenuating right paracolic densities with   associated retroperitoneal fat (304:83-93). Suspect additional nodularity   along the right posterior peritoneal lining (304:85), as well as similar   lesions in the pelvic, difficult to separate from bowel loops.  VESSELS: Mild atherosclerosis. No abdominal aortic aneurysm.  RETROPERITONEUM/LYMPH NODES: Mild bilateral external iliac   lymphadenopathy.  ABDOMINAL WALL: Small fat-containing umbilical hernia. Soft tissue edema   involving the left anterolateral abdominal wall without focal collection.  BONES: Degenerative changes. No lytic or blastic lesions. Left shoulder   total arthroplasty. Chronic healed fracture of the sternal body.    IMPRESSION:  Findings concerning for perforation of the fundal wall of the   gallbladder, communicating with a small amount of pericholecystic fluid,   and with a3.6 cm in greatest dimension complex cystic lesion in the   overlying hepatic parenchyma most likely an abscess.    The presence of gallstones and edematous wall thickening of the   gallbladder suggests that cholecystitis is the most likely cause forthis   perforation. Neoplasm less likely but possible.    Other smaller hepatic hypodensities may represent incidental cysts   although, given the probable carcinomatosis and right ovarian lesion   described below, metastases are also possible.    MRI abdomen with and without gadolinium and with MRCP would more   definitively image these gallbladder and hepatic abnormalities. Given the   patient's body habitus, ultrasound would probably not provide much more   information.    Several soft tissue density nodules in the omentum and peritoneal fat   most likely represents carcinomatosis. The most likely source is right   ovarian neoplasm; the right ovary is not identified separate from   numerous soft tissue and smaller calcific densities in theright adnexa   with surrounding ascitic fluid.    GYN protocol MRI pelvis with and without gadolinium would best evaluate   for right ovarian neoplasm.    Moderate volume simple density ascites.    No pulmonary embolism.    Small left and trace rightpleural effusions.            --- End of Report ---    < end of copied text >

## 2024-05-01 NOTE — PHYSICAL THERAPY INITIAL EVALUATION ADULT - ADDITIONAL COMMENTS
pt is a [facility]F resident, states she has been at [facility] x1 year (started as [facility] pt). Pt reports she needs assist to get out of bed and is ambulatory with rolling walker with S.

## 2024-05-01 NOTE — H&P ADULT - NSHPPHYSICALEXAM_GEN_ALL_CORE
T(C): 36.6 (05-01-24 @ 04:45), Max: 38.4 (04-30-24 @ 20:55)  T(F): 97.9 (05-01-24 @ 04:45), Max: 101.2 (04-30-24 @ 20:55)  HR: 82 (05-01-24 @ 04:45) (73 - 94)  BP: 104/58 (05-01-24 @ 04:45) (101/62 - 115/58)  RR: 18 (05-01-24 @ 04:45) (18 - 32)  SpO2: 94% (05-01-24 @ 04:45) (94% - 98%)  Wt(kg): --    PHYSICAL EXAM:  GENERAL: Clinically well-appearing and comfortable  HEAD:  Atraumatic, Normocephalic  EYES: EOMI, PERRL, conjunctiva and sclera clear  NECK: Supple, No JVD  CHEST/LUNG: Clear to auscultation bilaterally; No wheeze  HEART: Regular rate and rhythm; No murmurs, rubs, or gallops  ABDOMEN: Soft, Nontender, Nondistended; Bowel sounds present  EXTREMITIES:  2+ Peripheral Pulses, No clubbing, cyanosis, or edema  PSYCH: Normal mood, Normal affect  NEUROLOGY: non-focal  SKIN: No rashes or lesions T(C): 36.6 (05-01-24 @ 04:45), Max: 38.4 (04-30-24 @ 20:55)  T(F): 97.9 (05-01-24 @ 04:45), Max: 101.2 (04-30-24 @ 20:55)  HR: 82 (05-01-24 @ 04:45) (73 - 94)  BP: 104/58 (05-01-24 @ 04:45) (101/62 - 115/58)  RR: 18 (05-01-24 @ 04:45) (18 - 32)  SpO2: 94% (05-01-24 @ 04:45) (94% - 98%)  Wt(kg): --    PHYSICAL EXAM:  GENERAL: Clinically well-appearing and comfortable  HEAD:  Atraumatic, Normocephalic, NC in place  EYES: EOMI, PERRL, conjunctiva and sclera clear  NECK: Supple, No JVD  CHEST/LUNG: Clear to auscultation bilaterally; No wheeze  HEART: Regular rate and rhythm; No murmurs, rubs, or gallops  ABDOMEN: Soft, mildly TTP over right side, Mildly distended; Bowel sounds present  EXTREMITIES:  2+ Peripheral Pulses, No clubbing, cyanosis, or edema  PSYCH: Normal mood, Normal affect  NEUROLOGY: non-focal  SKIN: No rashes or lesions

## 2024-05-01 NOTE — H&P ADULT - PROBLEM SELECTOR PLAN 2
CT findings c/f cholelithiasis with perforation and ?absccess  -surg on board, appreciate recs to further characterize with MR abd/MRCP  -NPO for now, may ADAT pending imaging and IR recommendations  -ABx coverage as above CT findings c/f cholelithiasis with perforation and ?absccess  -surg on board, appreciate recs; should clarify whether findings in pelvis and abdomen could be sequela of biliary pathology  -f/u MRCP/MR abdomen  -NPO for now, may ADAT pending imaging  -IR on board, appreciate recs regarding necessity for percutaneous cholecystostomy vs abscess drainage  -ABx coverage as above CT findings c/f cholelithiasis with perforation and ?abscess of liver?  -surg on board, appreciate recs; should clarify whether findings in pelvis and abdomen could be sequela of biliary pathology  -f/u MRCP/MR abdomen  -NPO for now, may ADAT pending imaging  -IR on board, appreciate recs regarding necessity for percutaneous cholecystostomy and poss abscess drainage  -ABx coverage as above

## 2024-05-01 NOTE — PROGRESS NOTE ADULT - PROBLEM SELECTOR PLAN 7
DVT PPx: Lovenox 40 mg qd  Diet: NPO  Bowel Regimen: Miralax/Senna  Code: DNR/DNI   Dispo: Pending Hospital Course    --------------------------------------------------------------  Jamar Mora MD  PGY-2, Internal Medicine  [employer] Teams preferred  -------------------------------------------------------------- DVT PPx: Lovenox 40 mg qd  Diet: NPO  Bowel Regimen: Miralax/Senna  Code: will need to discuss w/ pt and son. pt reported DNR/DNI but would like to go over w/ son prior to filling out MOLST form.    Dispo: Pending Hospital Course

## 2024-05-01 NOTE — H&P ADULT - PROBLEM SELECTOR PLAN 3
with likely abdominal mets found on imaging  -will pursue MR pelvis  -GYN consult in AM with likely abdominal mets found on imaging  -will pursue MR pelvis  -check tumor markers  -GYN consult in AM poss abdominal mets found on imaging  -will pursue MR pelvis  -check tumor markers  -GYN consult in AM  - unclear if malignancy or sequela of perforated gallbladder

## 2024-05-01 NOTE — CONSULT NOTE ADULT - SUBJECTIVE AND OBJECTIVE BOX
Interventional Radiology    Evaluate for Procedure: Possible perforated cholecystitis/abscess on CT    HPI: 83 female history of COPD not on O2, obesity, HTN, CHF, BCC, hypothyroidism, psoriatic arthritis SUZIE BIBEMS for acute onset SOB.  Pt referred from [facility] where she has had increased work of breathing for 1 day.  Per ED report, patient was diagnosed recently with left-sided pneumonia, receiving IV antibiotics and IVF at the facility.  She reports weakness along with emesis around 5 days ago improved though she still reports poor appetite. She also reports right-sided abdominal pain and a dry cough which she suspects is related to allergies. Denies CP, sore throat, nausea, urinary/bowel changes except constipation, numbness/tingling, leg swelling.     In the ED, patient found to be febrile to 38.4, HR 94, maintained on NC, given CTX, APAP, Nebs, with urine and blood cultures sent. 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 Moderate volume ascites. Trace left pleural effusion.     General surgery consulted and recommended f/u Abd MRI/MRCP to further evaluate hepatic collection vs metastasis- which is ordered, pending.  IR consulted for possible perforated cholecystitis/abscess on CT.        Allergies: latex (Rash)  erythromycin (Rash)  phenobarbital (Rash)    Medications (Abx/Cardiac/Anticoagulation/Blood Products)  cefTRIAXone   IVPB: 100 mL/Hr IV Intermittent (04-30 @ 21:09)    Data:  111.13  T(C): 36.6  HR: 82  BP: 104/58  RR: 18  SpO2: 94%    -WBC 15.48 / HgB 9.9 / Hct 31.7 / Plt 124  -Na 135 / Cl 99 / BUN 40 / Glucose 180  -K 4.4 / CO2 23 / Cr 0.77  -ALT 56 / Alk Phos 120 / T.Bili 0.5  -INR 1.18 / PTT 23.1    Radiology:   CT Abdomen and Pelvis w/ IV Cont (05.01.24 @ 00:34)   FINDINGS:  CHEST:  LUNGS AND LARGE AIRWAYS: Patent central airways. No pneumonia or edema. Dependent atelectatic changes most prominent posterior aspect on the left.  PLEURA: Trace right and small left-sided pleural effusion.  VESSELS: No pulmonary embolus. No thoracic aortic aneurysm. Atherosclerosis including of the coronary arteries.  HEART: Mild cardiomegaly. No pericardial effusion. Mitral annular calcifications.  MEDIASTINUM AND BRANDEN: No lymphadenopathy.  CHEST WALL AND LOWER NECK: Unremarkable.    ABDOMEN AND PELVIS:  GALLBLADDER: Several gallstones in the gallbladder including extending toward the neck. Concentric edematous gallbladder wall thickening. Apparent small focal discontinuity of the fundal wall the gallbladder (304, 43 and 604, 67). The gallbladder contents appear to extend through this defect in the gallbladder fundal wall,communicating with a small amount of pericholecystic fluid, and with a 2.7 x 3.6 x 3.2 cm TRV X AP X CC irregular in contour complex low density mass in the overlying right lobe of liver.  LIVER: 1.4 cm not well-defined hypodensity in the caudate lobe. Better defined 1 cm small cystic lesion more anteriorly in the right lobe. Both visible on image 34 of series 304. A few other scattered tiny   hypodensities.  BILE DUCTS: Normal caliber.  SPLEEN: Within normal limits.  PANCREAS: Within normal limits.  ADRENALS: Within normal limits.  KIDNEYS/URETERS: No hydronephrosis or renal stone.    BLADDER: Within normal limits.  REPRODUCTIVE ORGANS: 4.6 cm in greatest dimension complex cystic mass expands the left ovary. The right ovary is not identified separate from numerous soft tissue density and smaller calcific densities in the right adnexa with surrounding ascitic fluid. Endometrium thickened for a postmenopausal patient, 1.5 cm AP.    BOWEL: No bowel obstruction. Appendix not identified, no evidence of appendicitis.  PERITONEUM: Moderate volume ascites. Soft tissue densities within the greater omentum (304:91) Mixed attenuating right paracolic densities with associated retroperitoneal fat (304:83-93). Suspect additional nodularity along the right posterior peritoneal lining (304:85), as well as similar lesions in the pelvic, difficult to separate from bowel loops.  VESSELS: Mild atherosclerosis. No abdominal aortic aneurysm.  RETROPERITONEUM/LYMPH NODES: Mild bilateral external iliac lymphadenopathy.  ABDOMINAL WALL: Small fat-containing umbilical hernia. Soft tissue edema involving the left anterolateral abdominal wall without focal collection.  BONES: Degenerative changes. No lytic or blastic lesions. Left shoulder total arthroplasty. Chronic healed fracture of the sternal body.    IMPRESSION:  Findings concerning for perforation of the fundal wall of the gallbladder, communicating with a small amount of pericholecystic fluid, and with a3.6 cm in greatest dimension complex cystic lesion in the   overlying hepatic parenchyma most likely an abscess.    The presence of gallstones and edematous wall thickening of the gallbladder suggests that cholecystitis is the most likely cause for this perforation. Neoplasm less likely but possible.    Other smaller hepatic hypodensities may represent incidental cysts although, given the probable carcinomatosis and right ovarian lesion described below, metastases are also possible.    MRI abdomen with and without gadolinium and with MRCP would more definitively image these gallbladder and hepatic abnormalities. Given the patient's body habitus, ultrasound would probably not provide much more information.    Several soft tissue density nodules in the omentum and peritoneal fat most likely represents carcinomatosis. The most likely source is right ovarian neoplasm; the right ovary is not identified separate from numerous soft tissue and smaller calcific densities in theright adnexa with surrounding ascitic fluid.    GYN protocol MRI pelvis with and without gadolinium would best evaluate for right ovarian neoplasm.    Moderate volume simple density ascites.    No pulmonary embolism.    Small left and trace rightpleural effusions.          Assessment/Plan: 83 female history of COPD not on O2, obesity, HTN, CHF, BCC, hypothyroidism, psoriatic arthritis SUZIE BIBEMS for acute onset SOB.  Pt referred from [facility] where she has had increased work of breathing for 1 day.  Per ED report, patient was diagnosed recently with left-sided pneumonia, receiving IV antibiotics and IVF at the facility.  She reports weakness along with emesis around 5 days ago improved though she still reports poor appetite. She also reports right-sided abdominal pain and a dry cough which she suspects is related to allergies. Denies CP, sore throat, nausea, urinary/bowel changes except constipation, numbness/tingling, leg swelling.     In the ED, patient found to be febrile to 38.4, HR 94, maintained on NC, given CTX, APAP, Nebs, with urine and blood cultures sent. 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 Moderate volume ascites. Trace left pleural effusion.     General surgery consulted and recommended f/u Abd MRI/MRCP to further evaluate hepatic collection vs metastasis- which is ordered, pending.  IR consulted for possible perforated cholecystitis/abscess on CT.    ***Note in progress- will discuss with IR attending at AM rounds***    Please keep pt NPO  Please hold anticoagulation and ASA  ------------------------------------------------------------------------------  - case reviewed and approved for ____  - please place IR procedure order under ______  - STAT labs in AM (cbc,coags, bmp, T&S)  - hold AC x24hrs  - NPO   - d/w primary team Interventional Radiology    Evaluate for Procedure: Possible perforated cholecystitis/abscess on CT    HPI: 83 female history of COPD not on O2, obesity, HTN, CHF, BCC, hypothyroidism, psoriatic arthritis SUZIE BIBEMS for acute onset SOB.  Pt referred from [facility] where she has had increased work of breathing for 1 day.  Per ED report, patient was diagnosed recently with left-sided pneumonia, receiving IV antibiotics and IVF at the facility.  She reports weakness along with emesis around 5 days ago improved though she still reports poor appetite. She also reports right-sided abdominal pain and a dry cough which she suspects is related to allergies. Denies CP, sore throat, nausea, urinary/bowel changes except constipation, numbness/tingling, leg swelling.     In the ED, patient found to be febrile to 38.4, HR 94, maintained on NC, given CTX, APAP, Nebs, with urine and blood cultures sent. 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 Moderate volume ascites. Trace left pleural effusion.     General surgery consulted and recommended f/u Abd MRI/MRCP to further evaluate hepatic collection vs metastasis- which is ordered, pending.  IR consulted for possible perforated cholecystitis/abscess on CT.        Allergies: latex (Rash)  erythromycin (Rash)  phenobarbital (Rash)    Medications (Abx/Cardiac/Anticoagulation/Blood Products)  cefTRIAXone   IVPB: 100 mL/Hr IV Intermittent (04-30 @ 21:09)    Data:  111.13  T(C): 36.6  HR: 82  BP: 104/58  RR: 18  SpO2: 94%    -WBC 15.48 / HgB 9.9 / Hct 31.7 / Plt 124  -Na 135 / Cl 99 / BUN 40 / Glucose 180  -K 4.4 / CO2 23 / Cr 0.77  -ALT 56 / Alk Phos 120 / T.Bili 0.5  -INR 1.18 / PTT 23.1    Radiology:   CT Abdomen and Pelvis w/ IV Cont (05.01.24 @ 00:34)   FINDINGS:  CHEST:  LUNGS AND LARGE AIRWAYS: Patent central airways. No pneumonia or edema. Dependent atelectatic changes most prominent posterior aspect on the left.  PLEURA: Trace right and small left-sided pleural effusion.  VESSELS: No pulmonary embolus. No thoracic aortic aneurysm. Atherosclerosis including of the coronary arteries.  HEART: Mild cardiomegaly. No pericardial effusion. Mitral annular calcifications.  MEDIASTINUM AND BRANDEN: No lymphadenopathy.  CHEST WALL AND LOWER NECK: Unremarkable.    ABDOMEN AND PELVIS:  GALLBLADDER: Several gallstones in the gallbladder including extending toward the neck. Concentric edematous gallbladder wall thickening. Apparent small focal discontinuity of the fundal wall the gallbladder (304, 43 and 604, 67). The gallbladder contents appear to extend through this defect in the gallbladder fundal wall,communicating with a small amount of pericholecystic fluid, and with a 2.7 x 3.6 x 3.2 cm TRV X AP X CC irregular in contour complex low density mass in the overlying right lobe of liver.  LIVER: 1.4 cm not well-defined hypodensity in the caudate lobe. Better defined 1 cm small cystic lesion more anteriorly in the right lobe. Both visible on image 34 of series 304. A few other scattered tiny   hypodensities.  BILE DUCTS: Normal caliber.  SPLEEN: Within normal limits.  PANCREAS: Within normal limits.  ADRENALS: Within normal limits.  KIDNEYS/URETERS: No hydronephrosis or renal stone.    BLADDER: Within normal limits.  REPRODUCTIVE ORGANS: 4.6 cm in greatest dimension complex cystic mass expands the left ovary. The right ovary is not identified separate from numerous soft tissue density and smaller calcific densities in the right adnexa with surrounding ascitic fluid. Endometrium thickened for a postmenopausal patient, 1.5 cm AP.    BOWEL: No bowel obstruction. Appendix not identified, no evidence of appendicitis.  PERITONEUM: Moderate volume ascites. Soft tissue densities within the greater omentum (304:91) Mixed attenuating right paracolic densities with associated retroperitoneal fat (304:83-93). Suspect additional nodularity along the right posterior peritoneal lining (304:85), as well as similar lesions in the pelvic, difficult to separate from bowel loops.  VESSELS: Mild atherosclerosis. No abdominal aortic aneurysm.  RETROPERITONEUM/LYMPH NODES: Mild bilateral external iliac lymphadenopathy.  ABDOMINAL WALL: Small fat-containing umbilical hernia. Soft tissue edema involving the left anterolateral abdominal wall without focal collection.  BONES: Degenerative changes. No lytic or blastic lesions. Left shoulder total arthroplasty. Chronic healed fracture of the sternal body.    IMPRESSION:  Findings concerning for perforation of the fundal wall of the gallbladder, communicating with a small amount of pericholecystic fluid, and with a3.6 cm in greatest dimension complex cystic lesion in the   overlying hepatic parenchyma most likely an abscess.    The presence of gallstones and edematous wall thickening of the gallbladder suggests that cholecystitis is the most likely cause for this perforation. Neoplasm less likely but possible.    Other smaller hepatic hypodensities may represent incidental cysts although, given the probable carcinomatosis and right ovarian lesion described below, metastases are also possible.    MRI abdomen with and without gadolinium and with MRCP would more definitively image these gallbladder and hepatic abnormalities. Given the patient's body habitus, ultrasound would probably not provide much more information.    Several soft tissue density nodules in the omentum and peritoneal fat most likely represents carcinomatosis. The most likely source is right ovarian neoplasm; the right ovary is not identified separate from numerous soft tissue and smaller calcific densities in theright adnexa with surrounding ascitic fluid.    GYN protocol MRI pelvis with and without gadolinium would best evaluate for right ovarian neoplasm.    Moderate volume simple density ascites.    No pulmonary embolism.    Small left and trace rightpleural effusions.          Assessment/Plan: 83 female history of COPD not on O2, obesity, HTN, CHF, BCC, hypothyroidism, psoriatic arthritis SUZIE BIBEMS for acute onset SOB.  Pt referred from [facility] where she has had increased work of breathing for 1 day.  Per ED report, patient was diagnosed recently with left-sided pneumonia, receiving IV antibiotics and IVF at the facility.  She reports weakness along with emesis around 5 days ago improved though she still reports poor appetite. She also reports right-sided abdominal pain and a dry cough which she suspects is related to allergies. Denies CP, sore throat, nausea, urinary/bowel changes except constipation, numbness/tingling, leg swelling.     In the ED, patient found to be febrile to 38.4, HR 94, maintained on NC, given CTX, APAP, Nebs, with urine and blood cultures sent. 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 Moderate volume ascites. Trace left pleural effusion.     General surgery consulted and recommended f/u Abd MRI/MRCP to further evaluate hepatic collection vs metastasis- which is ordered, pending.  Pt on zosyn.   IR consulted for possible perforated cholecystitis/abscess on CT.      Case reviewed on IR AM rounds   Please get HIDA this AM please  Please keep pt NPO  Please hold anticoagulation and ASA  IR will follow  - STAT labs in AM (cbc,coags, bmp, T&S)  - d/w primary team Interventional Radiology    Evaluate for Procedure: Possible perforated cholecystitis/abscess on CT    HPI: 83 female history of COPD not on O2, obesity, HTN, CHF, BCC, hypothyroidism, psoriatic arthritis SUZIE BIBEMS for acute onset SOB.  Pt referred from [facility] where she has had increased work of breathing for 1 day.  Per ED report, patient was diagnosed recently with left-sided pneumonia, receiving IV antibiotics and IVF at the facility.  She reports weakness along with emesis around 5 days ago improved though she still reports poor appetite. She also reports right-sided abdominal pain and a dry cough which she suspects is related to allergies. Denies CP, sore throat, nausea, urinary/bowel changes except constipation, numbness/tingling, leg swelling.     In the ED, patient found to be febrile to 38.4, HR 94, maintained on NC, given CTX, APAP, Nebs, with urine and blood cultures sent. 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 Moderate volume ascites. Trace left pleural effusion.     General surgery consulted and recommended f/u Abd MRI/MRCP to further evaluate hepatic collection vs metastasis- which is ordered, pending.  IR consulted for possible perforated cholecystitis/abscess on CT.        Allergies: latex (Rash)  erythromycin (Rash)  phenobarbital (Rash)    Medications (Abx/Cardiac/Anticoagulation/Blood Products)  cefTRIAXone   IVPB: 100 mL/Hr IV Intermittent (04-30 @ 21:09)    Data:  111.13  T(C): 36.6  HR: 82  BP: 104/58  RR: 18  SpO2: 94%    -WBC 15.48 / HgB 9.9 / Hct 31.7 / Plt 124  -Na 135 / Cl 99 / BUN 40 / Glucose 180  -K 4.4 / CO2 23 / Cr 0.77  -ALT 56 / Alk Phos 120 / T.Bili 0.5  -INR 1.18 / PTT 23.1    Radiology:   CT Abdomen and Pelvis w/ IV Cont (05.01.24 @ 00:34)   FINDINGS:  CHEST:  LUNGS AND LARGE AIRWAYS: Patent central airways. No pneumonia or edema. Dependent atelectatic changes most prominent posterior aspect on the left.  PLEURA: Trace right and small left-sided pleural effusion.  VESSELS: No pulmonary embolus. No thoracic aortic aneurysm. Atherosclerosis including of the coronary arteries.  HEART: Mild cardiomegaly. No pericardial effusion. Mitral annular calcifications.  MEDIASTINUM AND BRANDEN: No lymphadenopathy.  CHEST WALL AND LOWER NECK: Unremarkable.    ABDOMEN AND PELVIS:  GALLBLADDER: Several gallstones in the gallbladder including extending toward the neck. Concentric edematous gallbladder wall thickening. Apparent small focal discontinuity of the fundal wall the gallbladder (304, 43 and 604, 67). The gallbladder contents appear to extend through this defect in the gallbladder fundal wall,communicating with a small amount of pericholecystic fluid, and with a 2.7 x 3.6 x 3.2 cm TRV X AP X CC irregular in contour complex low density mass in the overlying right lobe of liver.  LIVER: 1.4 cm not well-defined hypodensity in the caudate lobe. Better defined 1 cm small cystic lesion more anteriorly in the right lobe. Both visible on image 34 of series 304. A few other scattered tiny   hypodensities.  BILE DUCTS: Normal caliber.  SPLEEN: Within normal limits.  PANCREAS: Within normal limits.  ADRENALS: Within normal limits.  KIDNEYS/URETERS: No hydronephrosis or renal stone.    BLADDER: Within normal limits.  REPRODUCTIVE ORGANS: 4.6 cm in greatest dimension complex cystic mass expands the left ovary. The right ovary is not identified separate from numerous soft tissue density and smaller calcific densities in the right adnexa with surrounding ascitic fluid. Endometrium thickened for a postmenopausal patient, 1.5 cm AP.    BOWEL: No bowel obstruction. Appendix not identified, no evidence of appendicitis.  PERITONEUM: Moderate volume ascites. Soft tissue densities within the greater omentum (304:91) Mixed attenuating right paracolic densities with associated retroperitoneal fat (304:83-93). Suspect additional nodularity along the right posterior peritoneal lining (304:85), as well as similar lesions in the pelvic, difficult to separate from bowel loops.  VESSELS: Mild atherosclerosis. No abdominal aortic aneurysm.  RETROPERITONEUM/LYMPH NODES: Mild bilateral external iliac lymphadenopathy.  ABDOMINAL WALL: Small fat-containing umbilical hernia. Soft tissue edema involving the left anterolateral abdominal wall without focal collection.  BONES: Degenerative changes. No lytic or blastic lesions. Left shoulder total arthroplasty. Chronic healed fracture of the sternal body.    IMPRESSION:  Findings concerning for perforation of the fundal wall of the gallbladder, communicating with a small amount of pericholecystic fluid, and with a3.6 cm in greatest dimension complex cystic lesion in the   overlying hepatic parenchyma most likely an abscess.    The presence of gallstones and edematous wall thickening of the gallbladder suggests that cholecystitis is the most likely cause for this perforation. Neoplasm less likely but possible.    Other smaller hepatic hypodensities may represent incidental cysts although, given the probable carcinomatosis and right ovarian lesion described below, metastases are also possible.    MRI abdomen with and without gadolinium and with MRCP would more definitively image these gallbladder and hepatic abnormalities. Given the patient's body habitus, ultrasound would probably not provide much more information.    Several soft tissue density nodules in the omentum and peritoneal fat most likely represents carcinomatosis. The most likely source is right ovarian neoplasm; the right ovary is not identified separate from numerous soft tissue and smaller calcific densities in theright adnexa with surrounding ascitic fluid.    GYN protocol MRI pelvis with and without gadolinium would best evaluate for right ovarian neoplasm.    Moderate volume simple density ascites.    No pulmonary embolism.    Small left and trace rightpleural effusions.          Assessment/Plan: 83 female history of COPD not on O2, obesity, HTN, CHF, BCC, hypothyroidism, psoriatic arthritis SUZIE BIBEMS for acute onset SOB.  Pt referred from [facility] where she has had increased work of breathing for 1 day.  Per ED report, patient was diagnosed recently with left-sided pneumonia, receiving IV antibiotics and IVF at the facility.  She reports weakness along with emesis around 5 days ago improved though she still reports poor appetite. She also reports right-sided abdominal pain and a dry cough which she suspects is related to allergies. Denies CP, sore throat, nausea, urinary/bowel changes except constipation, numbness/tingling, leg swelling.     In the ED, patient found to be febrile to 38.4, HR 94, maintained on NC, given CTX, APAP, Nebs, with urine and blood cultures sent. 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 Moderate volume ascites. Trace left pleural effusion.     General surgery consulted and recommended f/u Abd MRI/MRCP to further evaluate hepatic collection vs metastasis- which is ordered, pending.  Pt on zosyn. Per chart review from [facility] appears last ASA dose on 4/26, but unclear.  IR consulted for possible perforated cholecystitis/abscess on CT.      Case reviewed on IR AM rounds   Please get HIDA this AM please  Please keep pt NPO  Please hold anticoagulation and ASA  IR will follow  - STAT labs in AM (cbc,coags, bmp, T&S)  - d/w primary team

## 2024-05-01 NOTE — PROGRESS NOTE ADULT - SUBJECTIVE AND OBJECTIVE BOX
*******************************  Alejandra Mota MD (PGY-1)  Internal Medicine  Contact via [employer] TEAMS  *******************************    JUSTIN KEYES  83y  Female    Patient is a 83y old  Female who presents with a chief complaint of Sepsis, perforated gallbladder, metastatic Ovarian CA (01 May 2024 06:13)      Subjective:    Objective:  T(C): 36.6 (05-01-24 @ 04:45), Max: 38.4 (04-30-24 @ 20:55)  HR: 82 (05-01-24 @ 04:45) (73 - 94)  BP: 104/58 (05-01-24 @ 04:45) (101/62 - 115/58)  RR: 18 (05-01-24 @ 04:45) (18 - 32)  SpO2: 94% (05-01-24 @ 04:45) (94% - 98%)  I&O's Summary      PHYSICAL EXAM:  GENERAL: NAD  HEAD:  Atraumatic, Normocephalic  EYES: EOMI, PERRLA, conjunctiva and sclera clear  ENMT: Moist mucous membranes  NECK: Supple, No JVD, trachea midline   NERVOUS SYSTEM:  Alert & Oriented X3, Good concentration; Motor Strength 5/5 B/L upper and lower extremities; DTRs 2+ intact and symmetric  CHEST/LUNG: Clear to auscultation bilaterally; No rales, rhonchi, wheezing, or rubs  HEART: Regular rate and rhythm; No murmurs, rubs, or gallops  ABDOMEN: Soft, Nontender, Nondistended; Bowel sounds present  EXTREMITIES:  2+ Peripheral Pulses, No clubbing, cyanosis, or edema  LYMPH: No lymphadenopathy noted  SKIN: No rashes or lesions    MEDICATIONS  (STANDING):  gabapentin 800 milliGRAM(s) Oral every 12 hours  lactated ringers. 1000 milliLiter(s) (100 mL/Hr) IV Continuous <Continuous>  levothyroxine 50 MICROGram(s) Oral daily  loratadine 10 milliGRAM(s) Oral daily  piperacillin/tazobactam IVPB. 3.375 Gram(s) IV Intermittent once  piperacillin/tazobactam IVPB.- 3.375 Gram(s) IV Intermittent once  piperacillin/tazobactam IVPB.- 3.375 Gram(s) IV Intermittent once  piperacillin/tazobactam IVPB.. 3.375 Gram(s) IV Intermittent every 8 hours  polyethylene glycol 3350 17 Gram(s) Oral daily  senna 2 Tablet(s) Oral at bedtime    MEDICATIONS  (PRN):  acetaminophen     Tablet .. 650 milliGRAM(s) Oral every 6 hours PRN Temp greater or equal to 38C (100.4F), Mild Pain (1 - 3)  oxyCODONE    IR 5 milliGRAM(s) Oral every 4 hours PRN Moderate Pain (4 - 6)      LABS:        CAPILLARY BLOOD GLUCOSE          RADIOLOGY & ADDITIONAL TESTS:               *******************************  Alejandra Mota MD (PGY-1)  Internal Medicine  Contact via [employer] TEAMS  *******************************    JUSTIN KEYES  83y  Female    Patient is a 83y old  Female who presents with a chief complaint of Sepsis, perforated gallbladder, metastatic Ovarian CA (01 May 2024 06:13)    Subjective: No acute events overnight. Seen at bedside this morning. Patient noted improvement in breathing. Still w/ abdominal pain when she coughs. Denied any fever, chills, chest pain, n/v.     Objective:  T(C): 36.6 (05-01-24 @ 04:45), Max: 38.4 (04-30-24 @ 20:55)  HR: 82 (05-01-24 @ 04:45) (73 - 94)  BP: 104/58 (05-01-24 @ 04:45) (101/62 - 115/58)  RR: 18 (05-01-24 @ 04:45) (18 - 32)  SpO2: 94% (05-01-24 @ 04:45) (94% - 98%)  I&O's Summary    PHYSICAL EXAM:  GENERAL: NAD, obese   HEAD:  Atraumatic, Normocephalic  EYES: EOMI, PERRLA, conjunctiva and sclera clear  ENMT: Moist mucous membranes  NECK: Supple, No JVD, trachea midline   NERVOUS SYSTEM:  Alert & Oriented X3, Good concentration  CHEST/LUNG: Clear to auscultation bilaterally on anterior exam   HEART: Regular rate and rhythm; No murmurs, rubs, or gallops  ABDOMEN: Soft, mildly tender to palpation in RUQ, Nondistended; Bowel sounds present, no rebound tenderness or guarding   EXTREMITIES:  2+ Peripheral Pulses, No clubbing, cyanosis, or edema  SKIN: No rashes or lesions    MEDICATIONS  (STANDING):  gabapentin 800 milliGRAM(s) Oral every 12 hours  lactated ringers. 1000 milliLiter(s) (100 mL/Hr) IV Continuous <Continuous>  levothyroxine 50 MICROGram(s) Oral daily  loratadine 10 milliGRAM(s) Oral daily  piperacillin/tazobactam IVPB. 3.375 Gram(s) IV Intermittent once  piperacillin/tazobactam IVPB.- 3.375 Gram(s) IV Intermittent once  piperacillin/tazobactam IVPB.- 3.375 Gram(s) IV Intermittent once  piperacillin/tazobactam IVPB.. 3.375 Gram(s) IV Intermittent every 8 hours  polyethylene glycol 3350 17 Gram(s) Oral daily  senna 2 Tablet(s) Oral at bedtime    MEDICATIONS  (PRN):  acetaminophen     Tablet .. 650 milliGRAM(s) Oral every 6 hours PRN Temp greater or equal to 38C (100.4F), Mild Pain (1 - 3)  oxyCODONE    IR 5 milliGRAM(s) Oral every 4 hours PRN Moderate Pain (4 - 6)    LABS:             9.9    15.48 )-----------( 124      ( 30 Apr 2024 21:30 )             31.7     04-30    135  |  99  |  40<H>  ----------------------------<  180<H>  4.4   |  23  |  0.77    Ca    8.7      30 Apr 2024 21:30    TPro  7.2  /  Alb  3.2<L>  /  TBili  0.5  /  DBili  x   /  AST  83<H>  /  ALT  56<H>  /  AlkPhos  120  04-30     PT/INR - ( 30 Apr 2024 21:30 )   PT: 12.3 sec;   INR: 1.18 ratio   PTT - ( 30 Apr 2024 21:30 )  PTT:23.1 sec    CAPILLARY BLOOD GLUCOSE    RADIOLOGY & ADDITIONAL TESTS:

## 2024-05-01 NOTE — H&P ADULT - PROBLEM SELECTOR PLAN 4
-Patient asymptomatic  -c/w Tele monitoring Doubt true pulm pathology  -likely compensatory based on abdominal pathology  -wean oxygen as tolerated - CT neg for pna and doesn't explain pt's symptoms  -likely compensatory based on abdominal pathology  -wean oxygen as tolerated

## 2024-05-01 NOTE — CHART NOTE - NSCHARTNOTEFT_GEN_A_CORE
HIDA scan negative for acute cholecystitis or biliary obstruction. Patient remains hemodynamically stable, currently afebrile on antibiotics.     -Please obtain MRI to further evaluate for abscess vs. mass/malignancy   -if patient becomes hemodynamically stable, please contact IR  -keep NPO for now and continue to hold ASA/AC pending MRI results  -d/w MD Leon  -d/w primary team    Any questions or concerns regarding above please reach out to IR:   -During working hours (7a-5p): call [phone]  -Emergent issues after 5pm: page: [phone]  -Non-emergent consults: Please place a [place] order "IR Consult" with an appropriate callback number  -Scheduling questions: [phone]  -Clinic/Outpatient [phone]

## 2024-05-01 NOTE — H&P ADULT - NSHPREVIEWOFSYSTEMS_GEN_ALL_CORE
REVIEW OF SYSTEMS:    CONSTITUTIONAL: No weakness, fevers or chills  EYES: no blurry vision or eye pain.   ENT: No throat pain. No dysphagia.    NECK: No pain or stiffness  RESPIRATORY: No cough, wheezing, hemoptysis; No shortness of breath  CARDIOVASCULAR: No chest pain or palpitations.  GASTROINTESTINAL: No abdominal pain. No nausea or vomiting; No diarrhea or constipation. No melena or hematochezia.  GENITOURINARY: No dysuria, frequency or hematuria  NEUROLOGICAL: No numbness or weakness. No dizziness or falls.   SKIN: No itching, burning, rashes, or lesions.   LYMPHATIC: No masses or swelling.   All other review of systems is negative unless indicated above. REVIEW OF SYSTEMS:    CONSTITUTIONAL: +weakness, fevers or chills  EYES: no blurry vision or eye pain.   ENT: No throat pain. No dysphagia.    NECK: No pain or stiffness  RESPIRATORY: No cough, wheezing, hemoptysis; +shortness of breath  CARDIOVASCULAR: No chest pain or palpitations.  GASTROINTESTINAL: +abdominal pain. +n/vomiting, now improved; No diarrhea, +constipation. No melena or hematochezia.  GENITOURINARY: No dysuria, frequency or hematuria  NEUROLOGICAL: No numbness or weakness. No dizziness or falls.   SKIN: No itching, burning, rashes, or lesions.   LYMPHATIC: No masses or swelling.   All other review of systems is negative unless indicated above.

## 2024-05-01 NOTE — PHYSICAL THERAPY INITIAL EVALUATION ADULT - PERTINENT HX OF CURRENT PROBLEM, REHAB EVAL
82 yo F from [facility] where she is long term resident, and she has had increased work of breathing for 1 day.  Per ED report, patient was diagnosed recently with left-sided pneumonia, receiving IV zosyn and IVF at the facility.  She reports weakness along with emesis around 5 days ago improved though she still reports poor appetite. She also reports right-sided abdominal pain and a dry cough which she suspects is related to allergies. Denies CP, sore throat, nausea, urinary/bowel changes except constipation, numbness/tingling, leg swelling. CT Angio Chest 5/1 concerning for perforation of the fundal wall of the gallbladder, communicating with a small amount of pericholecystic fluid, and with a 3.6 cm in greatest dimension complex cystic lesion in the overlying hepatic parenchyma most likely an abscess. The presence of gallstones and edematous wall thickening of the gallbladder suggests that cholecystitis is the most likely cause for this perforation. Neoplasm less likely but possible. Other smaller hepatic hypodensities may represent incidental cysts although, given the probable carcinomatosis and right ovarian lesion described below, metastases are also possible. Several soft tissue density nodules in the omentum and peritoneal fat most likely represents carcinomatosis. Moderate volume simple density ascites. No pulmonary embolism. Small left and trace right pleural effusions. no acute surgical intervention per surgery.

## 2024-05-01 NOTE — PATIENT PROFILE ADULT - FALL HARM RISK - FALL HARM RISK
----- Message from Batsheva Hurley sent at 5/29/2019  1:32 PM CDT -----  Contact: self/ [phone]  Patient is requesting a call back regarding, can you take care of her issue she did not say what her issue is. Please advise      Other

## 2024-05-01 NOTE — H&P ADULT - PROBLEM SELECTOR PLAN 7
DVT PPx: Lovenox 40 mg qd  Diet: NPO  Bowel Regimen: Miralax/Senna  Code: DNR/DNI   Dispo: Pending Hospital Course    --------------------------------------------------------------  Jamar Mora MD  PGY-2, Internal Medicine  [employer] Teams preferred  -------------------------------------------------------------- DVT PPx: restart asa and DVT ppx after IR procedure  Diet: NPO  Bowel Regimen: Miralax/Senna  Code: DNR/DNI   Dispo: Pending Hospital Course    --------------------------------------------------------------  Jamar Mora MD  PGY-2, Internal Medicine  [employer] Teams preferred  --------------------------------------------------------------

## 2024-05-01 NOTE — H&P ADULT - REASON FOR ADMISSION
Sepsis, perforated gallbladder, metastatic Ovarian CA Sepsis, perforated gallbladder, sob, poss metastatic Ovarian CA

## 2024-05-01 NOTE — PROGRESS NOTE ADULT - PROBLEM SELECTOR PLAN 1
Meets SIRS for sepsis (leukocytosis, tachypnea, temperature, HR), unclear if true infection from GB changes vs malignancy  -s/p CTX in ED, c/w Zosyn and de-escalate as able  -f/u blood and urine Cx  -IVF with 100 cc LR x 10 hours  -trend WBC and fever curve Met sepsis criteria w/ leukocytosis, tachypnea, fever and tachycardia. Likely secondary to perforated gallbladder seen on CT scan. U/A, RVP negative. CTA neg for PE.     - continue w/ zosyn   - f/u blood and urine Cx  - continue w/ IVF with 100 cc LR x 10 hours given pt currently NPO   - trend WBC and fever curve

## 2024-05-01 NOTE — H&P ADULT - HISTORY OF PRESENT ILLNESS
83 female history of COPD not on O2, obesity, HTN, CHF presents with SOB.  Patient brought in by ambulance from [facility] where she has had increased work of breathing for 1 day.  Recently had cough and shortness of breath diagnosed with left-sided pneumonia, has been getting IV antibiotics at the facility.  Patient reports shortness of breath acutely worsening this evening.  Patient placed on O2 by EMS satting 95% on 4 LNC.  Given DuoNeb X1.  Patient also reports bilateral sharp chest pain.  Recently treated for UTI in February and March.  Denies chest pressure or radiating to back or shoulders.  Reports occasional generalized right-sided abdominal pain.  Denies fever, nausea, vomiting, diarrhea, dark or bloody stools, urinary symptoms. 83 female history of COPD not on O2, obesity, HTN, CHF, BCC, hypothyroidism, psoriatic arthritis USZIE BIBEMS for acute onset SOB.  Pt referred from [facility] where she has had increased work of breathing for 1 day.  Per ED report, patient was diagnosed recently with left-sided pneumonia, receiving IV antibiotics and IVF at the facility.  She reports weakness along with emesis around 5 days ago improved though she still reports poor appetite. She also reports right-sided abdominal pain and a dry cough which she suspects is related to allergies. Denies CP, sore throat, nausea, urinary/bowel changes except constipation, numbness/tingling, leg swelling.     In the ED, patient found to be febrile to 38.4, HR 94, maintained on NC, given CTX, APAP, Nebs, with urine and blood cultures sent.  83 female history of COPD not on O2, morbid obesity, HTN, CHF, BCC, hypothyroidism, psoriatic arthritis, SUZIE, currently on abx for presumed PNA, BIBEMS for acute onset SOB.      Pt admitted from [facility] where she is long term resident, and she has had increased work of breathing for 1 day.  Per ED report, patient was diagnosed recently with left-sided pneumonia, receiving IV zosyn and IVF at the facility.  She reports weakness along with emesis around 5 days ago improved though she still reports poor appetite. She also reports right-sided abdominal pain and a dry cough which she suspects is related to allergies. Denies CP, sore throat, nausea, urinary/bowel changes except constipation, numbness/tingling, leg swelling.     In the ED, patient found to be febrile to 38.4, HR 94, maintained on NC, given CTX, APAP, Nebs, with urine and blood cultures sent.

## 2024-05-01 NOTE — CONSULT NOTE ADULT - ATTENDING COMMENTS
ATTENDING ATTESTATION:    83F history of obesity, HTN, CAD, COPD/SUZIE, arthritis transferred from nursing home with SOB. Had been placed on zosyn at NH for presumed pneumonia. Patient denies abdominal pain, was tolerating a diet.     Febrile 101.2  on 4L O2 NC  Hemodynamically stable    Appears chronically ill  Abd obese, soft, difficult to elicit RUQ tenderness due to habitu    WBC = 15  Hb = 9.9  AST = 83  ALT = 56  ALP = 120    CTA chest - no PE, small bilateral pleural effusions with basilar atelectasis  CT A/P with IV contrast:  - gallbladder with cholelithiasis, concentric thickening, ?focal discontinuity of the fundal wall adjacent to irregular hypoechoic mass in right lobe of liver questionable abscess  - multiple other irregular small hypodensities in the caudate lobe and throughout both lobes  - complex right 4.6cm cystic ovarian mass, moderate ascites with soft tissue densities within the greater omentum and peritoneum    A/P:   #Possible perforated cholecystitis with atypical symptoms  - would pursue MRI abd/pelvis with and without contrast as unclear if the liver findings are abscesses versus metastases in the setting of an incidental complex ovarian mass  - if confirmed gallbladder perforation, recommend IR consult for percutaneous cholecystostomy tube  - if no perforation and ongoing concern for acute cholecystitis, recommend HIDA scan with possible percutaneous cholecystostomy tube if positive  - agree with empiric zosyn  - given patient's other comorbidities as well as the possibility of a new malignancy, patient is not a surgical candidate at this time    Please reconsult as needed.       Total time spent in the care of this patient today (excluding critical care, teaching & procedures): 42 minutes    Over 50% of the total time was spent in discussion and coordination of care with consulting services, dietary and rehab services.    Ashlie Simon MD  Acute Care Surgery

## 2024-05-01 NOTE — H&P ADULT - CONVERSATION DETAILS
pt's next of kin are her children.  pt has 2 sons  pt requests DNR/DNI.  pt is ok with all medical therapy as necessary and indicated

## 2024-05-01 NOTE — H&P ADULT - ASSESSMENT
83 female history of COPD not on O2, obesity, HTN, CHF presents with SOB   83 female history of COPD not on O2, obesity, HTN, CHF, BCC, hypothyroidism, psoriatic arthritis SUZIE BIBEMS for acute onset SOB, meets SIRS for sepsis, cross-sectional imaging c/f perforated gallbladder, ?abscess, and right ovarian neoplasm with carcinomatosis.  83 female history of COPD not on O2, obesity, HTN, CHF, BCC, hypothyroidism, psoriatic arthritis SUZIE BIBEMS for acute onset SOB, meets SIRS for sepsis, cross-sectional imaging c/f perforated gallbladder, ?liver abscess, and possible right ovarian neoplasm with carcinomatosis?

## 2024-05-01 NOTE — PROGRESS NOTE ADULT - PROBLEM SELECTOR PLAN 3
with likely abdominal mets found on imaging  -will pursue MR pelvis  -check tumor markers  -GYN consult in AM CT A/P suggestive of right ovarian malignancy w/ carcinomatosis.     -will pursue MR pelvis  -f/u  tumor markers  -GYN onc consult once imaging is performed

## 2024-05-01 NOTE — ED PROCEDURE NOTE - NS ED ATTENDING STATEMENT MOD
This was a shared visit with the SAMANTHA. I reviewed and verified the documentation. Purse String (Intermediate) Text: Given the location of the defect and the characteristics of the surrounding skin a purse string intermediate closure was deemed most appropriate.  Undermining was performed circumferentially around the surgical defect.  A purse string suture was then placed and tightened.

## 2024-05-01 NOTE — H&P ADULT - NSHPLABSRESULTS_GEN_ALL_CORE
9.9    15.48 )-----------( 124      ( 2024 21:30 )             31.7           135  |  99  |  40<H>  ----------------------------<  180<H>  4.4   |  23  |  0.77    Ca    8.7      2024 21:30    TPro  7.2  /  Alb  3.2<L>  /  TBili  0.5  /  DBili  x   /  AST  83<H>  /  ALT  56<H>  /  AlkPhos  120                Urinalysis Basic - ( 2024 23:24 )    Color: Yellow / Appearance: Cloudy / S.022 / pH: x  Gluc: x / Ketone: Negative mg/dL  / Bili: Negative / Urobili: 0.2 mg/dL   Blood: x / Protein: 30 mg/dL / Nitrite: Negative   Leuk Esterase: Negative / RBC: 1 /HPF / WBC 1 /HPF   Sq Epi: x / Non Sq Epi: 5 /HPF / Bacteria: Negative /HPF        PT/INR - ( 2024 21:30 )   PT: 12.3 sec;   INR: 1.18 ratio         PTT - ( 2024 21:30 )  PTT:23.1 sec    Lactate Trend            CAPILLARY BLOOD GLUCOSE

## 2024-05-01 NOTE — PROGRESS NOTE ADULT - PROBLEM SELECTOR PLAN 4
Doubt true pulm pathology  -likely compensatory based on abdominal pathology  -wean oxygen as tolerated Unclear etiology. Pt previously treated at [facility] for pneumonia. Suspect AHRF likely related to ab pathology.     -wean oxygen as tolerated

## 2024-05-01 NOTE — PROGRESS NOTE ADULT - ASSESSMENT
83 female history of COPD not on O2, obesity, HTN, CHF, BCC, hypothyroidism, psoriatic arthritis SUZIE BIBEMS for acute onset SOB, meets SIRS for sepsis, cross-sectional imaging c/f perforated gallbladder, ?abscess, and right ovarian neoplasm with carcinomatosis.  83 female history of COPD not on O2, obesity, HTN, CHF, BCC, hypothyroidism, psoriatic arthritis SUZIE BIBEMS for acute onset SOB, admitted for sepsis secondary to perforated gallbladder. Also found to have complex cystic liver lesion and right ovarian neoplasm w/ carcinomatosis. Hospital course complicated w/ AHRF, unclear etiology but likely related to ongoing abdominal pathology.

## 2024-05-02 DIAGNOSIS — J96.01 ACUTE RESPIRATORY FAILURE WITH HYPOXIA: ICD-10-CM

## 2024-05-02 LAB
ALBUMIN SERPL ELPH-MCNC: 2.9 G/DL — LOW (ref 3.3–5)
ALP SERPL-CCNC: 123 U/L — HIGH (ref 40–120)
ALT FLD-CCNC: 51 U/L — HIGH (ref 10–45)
ANION GAP SERPL CALC-SCNC: 11 MMOL/L — SIGNIFICANT CHANGE UP (ref 5–17)
ANISOCYTOSIS BLD QL: SLIGHT — SIGNIFICANT CHANGE UP
AST SERPL-CCNC: 56 U/L — HIGH (ref 10–40)
BASOPHILS # BLD AUTO: 0 K/UL — SIGNIFICANT CHANGE UP (ref 0–0.2)
BASOPHILS NFR BLD AUTO: 0 % — SIGNIFICANT CHANGE UP (ref 0–2)
BILIRUB SERPL-MCNC: 0.8 MG/DL — SIGNIFICANT CHANGE UP (ref 0.2–1.2)
BUN SERPL-MCNC: 25 MG/DL — HIGH (ref 7–23)
CALCIUM SERPL-MCNC: 8.9 MG/DL — SIGNIFICANT CHANGE UP (ref 8.4–10.5)
CHLORIDE SERPL-SCNC: 104 MMOL/L — SIGNIFICANT CHANGE UP (ref 96–108)
CO2 SERPL-SCNC: 25 MMOL/L — SIGNIFICANT CHANGE UP (ref 22–31)
CREAT SERPL-MCNC: 0.58 MG/DL — SIGNIFICANT CHANGE UP (ref 0.5–1.3)
CULTURE RESULTS: SIGNIFICANT CHANGE UP
DACRYOCYTES BLD QL SMEAR: SLIGHT — SIGNIFICANT CHANGE UP
EGFR: 90 ML/MIN/1.73M2 — SIGNIFICANT CHANGE UP
EOSINOPHIL # BLD AUTO: 0.22 K/UL — SIGNIFICANT CHANGE UP (ref 0–0.5)
EOSINOPHIL NFR BLD AUTO: 1.7 % — SIGNIFICANT CHANGE UP (ref 0–6)
GLUCOSE SERPL-MCNC: 87 MG/DL — SIGNIFICANT CHANGE UP (ref 70–99)
HCT VFR BLD CALC: 33.5 % — LOW (ref 34.5–45)
HGB BLD-MCNC: 10.6 G/DL — LOW (ref 11.5–15.5)
LYMPHOCYTES # BLD AUTO: 0.45 K/UL — LOW (ref 1–3.3)
LYMPHOCYTES # BLD AUTO: 3.4 % — LOW (ref 13–44)
MAGNESIUM SERPL-MCNC: 2.2 MG/DL — SIGNIFICANT CHANGE UP (ref 1.6–2.6)
MANUAL SMEAR VERIFICATION: SIGNIFICANT CHANGE UP
MCHC RBC-ENTMCNC: 29.3 PG — SIGNIFICANT CHANGE UP (ref 27–34)
MCHC RBC-ENTMCNC: 31.6 GM/DL — LOW (ref 32–36)
MCV RBC AUTO: 92.5 FL — SIGNIFICANT CHANGE UP (ref 80–100)
MONOCYTES # BLD AUTO: 1.35 K/UL — HIGH (ref 0–0.9)
MONOCYTES NFR BLD AUTO: 10.3 % — SIGNIFICANT CHANGE UP (ref 2–14)
NEUTROPHILS # BLD AUTO: 11.1 K/UL — HIGH (ref 1.8–7.4)
NEUTROPHILS NFR BLD AUTO: 84.6 % — HIGH (ref 43–77)
PHOSPHATE SERPL-MCNC: 1.9 MG/DL — LOW (ref 2.5–4.5)
PLAT MORPH BLD: NORMAL — SIGNIFICANT CHANGE UP
PLATELET # BLD AUTO: 128 K/UL — LOW (ref 150–400)
POIKILOCYTOSIS BLD QL AUTO: SLIGHT — SIGNIFICANT CHANGE UP
POLYCHROMASIA BLD QL SMEAR: SLIGHT — SIGNIFICANT CHANGE UP
POTASSIUM SERPL-MCNC: 4 MMOL/L — SIGNIFICANT CHANGE UP (ref 3.5–5.3)
POTASSIUM SERPL-SCNC: 4 MMOL/L — SIGNIFICANT CHANGE UP (ref 3.5–5.3)
PROT SERPL-MCNC: 6.4 G/DL — SIGNIFICANT CHANGE UP (ref 6–8.3)
RBC # BLD: 3.62 M/UL — LOW (ref 3.8–5.2)
RBC # FLD: 15 % — HIGH (ref 10.3–14.5)
RBC BLD AUTO: ABNORMAL
SODIUM SERPL-SCNC: 140 MMOL/L — SIGNIFICANT CHANGE UP (ref 135–145)
SPECIMEN SOURCE: SIGNIFICANT CHANGE UP
TARGETS BLD QL SMEAR: SLIGHT — SIGNIFICANT CHANGE UP
WBC # BLD: 13.12 K/UL — HIGH (ref 3.8–10.5)
WBC # FLD AUTO: 13.12 K/UL — HIGH (ref 3.8–10.5)

## 2024-05-02 PROCEDURE — 99233 SBSQ HOSP IP/OBS HIGH 50: CPT | Mod: GC

## 2024-05-02 RX ORDER — BUDESONIDE, MICRONIZED 100 %
0.25 POWDER (GRAM) MISCELLANEOUS
Refills: 0 | Status: DISCONTINUED | OUTPATIENT
Start: 2024-05-02 | End: 2024-05-10

## 2024-05-02 RX ORDER — IPRATROPIUM/ALBUTEROL SULFATE 18-103MCG
3 AEROSOL WITH ADAPTER (GRAM) INHALATION EVERY 6 HOURS
Refills: 0 | Status: DISCONTINUED | OUTPATIENT
Start: 2024-05-02 | End: 2024-05-10

## 2024-05-02 RX ORDER — CHLORHEXIDINE GLUCONATE 213 G/1000ML
1 SOLUTION TOPICAL DAILY
Refills: 0 | Status: DISCONTINUED | OUTPATIENT
Start: 2024-05-02 | End: 2024-05-10

## 2024-05-02 RX ADMIN — Medication 50 MICROGRAM(S): at 06:11

## 2024-05-02 RX ADMIN — Medication 3 MILLILITER(S): at 09:00

## 2024-05-02 RX ADMIN — LORATADINE 10 MILLIGRAM(S): 10 TABLET ORAL at 11:09

## 2024-05-02 RX ADMIN — GABAPENTIN 800 MILLIGRAM(S): 400 CAPSULE ORAL at 06:11

## 2024-05-02 RX ADMIN — GABAPENTIN 800 MILLIGRAM(S): 400 CAPSULE ORAL at 17:18

## 2024-05-02 RX ADMIN — PIPERACILLIN AND TAZOBACTAM 25 GRAM(S): 4; .5 INJECTION, POWDER, LYOPHILIZED, FOR SOLUTION INTRAVENOUS at 06:12

## 2024-05-02 RX ADMIN — Medication 85 MILLIMOLE(S): at 10:49

## 2024-05-02 RX ADMIN — PIPERACILLIN AND TAZOBACTAM 25 GRAM(S): 4; .5 INJECTION, POWDER, LYOPHILIZED, FOR SOLUTION INTRAVENOUS at 21:09

## 2024-05-02 RX ADMIN — CHLORHEXIDINE GLUCONATE 1 APPLICATION(S): 213 SOLUTION TOPICAL at 16:34

## 2024-05-02 RX ADMIN — Medication 3 MILLILITER(S): at 21:09

## 2024-05-02 RX ADMIN — Medication 3 MILLILITER(S): at 11:09

## 2024-05-02 RX ADMIN — POLYETHYLENE GLYCOL 3350 17 GRAM(S): 17 POWDER, FOR SOLUTION ORAL at 11:09

## 2024-05-02 RX ADMIN — Medication 3 MILLILITER(S): at 17:18

## 2024-05-02 RX ADMIN — SENNA PLUS 2 TABLET(S): 8.6 TABLET ORAL at 21:09

## 2024-05-02 RX ADMIN — Medication 0.25 MILLIGRAM(S): at 16:34

## 2024-05-02 NOTE — CHART NOTE - NSCHARTNOTEFT_GEN_A_CORE
Given multiple comorbidities and likely new diagnosis of gyn malignancy w/ metastases seen on imaging, discussed goals of care w/ patient and son at bedside. In the event of an emergency where patient undergoes cardiac and/or respiratory arrest, patient would not want chest compressions and intubation. Filled out MOLST at bedside w/ patient and placed in chart. Given multiple comorbidities and likely new diagnosis of gyn malignancy w/ metastases seen on imaging, discussed goals of care w/ patient and son at bedside. In the event of an emergency where patient undergoes cardiac and/or respiratory arrest, patient would not want chest compressions and intubation. Patient DNR/DNI w/ trial of NIV. Filled out MOLST at bedside w/ patient and placed in chart.

## 2024-05-02 NOTE — DIETITIAN INITIAL EVALUATION ADULT - ADD RECOMMEND
1) When feasible, Recommend advance to Low Fat, DASH diet as tolerated.   2) Consider adding Multivitamin and vitamin C daily to promote wound healing pending no medical contraindications.  3) Continue to monitor PO intake, weight, labs, skin, GI status, and diet.  4) BMI sticker placed in chart.

## 2024-05-02 NOTE — PROGRESS NOTE ADULT - PROBLEM SELECTOR PLAN 7
DVT PPx: Lovenox 40 mg qd  Diet: NPO  Bowel Regimen: Miralax/Senna  Code: will need to discuss w/ pt and son. pt reported DNR/DNI but would like to go over w/ son prior to filling out MOLST form.    Dispo: Pending Hospital Course DVT PPx: Lovenox 40 mg qd  Diet: NPO for now pending further IR recs   Bowel Regimen: Miralax/Senna  Code: will need to discuss w/ pt and son. pt reported DNR/DNI but would like to go over w/ son prior to filling out MOLST form.    Dispo: Pending Hospital Course

## 2024-05-02 NOTE — DIETITIAN INITIAL EVALUATION ADULT - NSPROEDAABILITYLEARN_GEN_A_NUR
Diet education not appropriate at this time; pt NPO x admission. Addressed all questions asked by pt and son. Pt made aware RD remains available PRN./none

## 2024-05-02 NOTE — PROGRESS NOTE ADULT - PROBLEM SELECTOR PLAN 4
Unclear etiology. Pt previously treated at [facility] for pneumonia. Suspect AHRF likely related to ab pathology.     -wean oxygen as tolerated Unclear etiology. Pt previously treated at [facility] for pneumonia. Also w/ hx of COPD not on any medications or home medications. Suspect AHRF 2/2 to COPD exacerbation vs ab pathology.     - wean oxygen as tolerated  - CPAP at night for SUZIE

## 2024-05-02 NOTE — DIETITIAN INITIAL EVALUATION ADULT - PROBLEM SELECTOR PLAN 2
CT findings c/f cholelithiasis with perforation and ?abscess of liver?  -surg on board, appreciate recs; should clarify whether findings in pelvis and abdomen could be sequela of biliary pathology  -f/u MRCP/MR abdomen  -NPO for now, may ADAT pending imaging  -IR on board, appreciate recs regarding necessity for percutaneous cholecystostomy and poss abscess drainage  -ABx coverage as above

## 2024-05-02 NOTE — PROGRESS NOTE ADULT - SUBJECTIVE AND OBJECTIVE BOX
*******************************  Alejandra Mota MD (PGY-1)  Internal Medicine  Contact via [employer] TEAMS  *******************************    JUSTIN KEYES  83y  Female    Patient is a 83y old  Female who presents with a chief complaint of Sepsis, perforated gallbladder, metastatic Ovarian CA (01 May 2024 07:00)      Subjective:    Objective:  T(C): 36.7 (05-02-24 @ 04:00), Max: 37.1 (05-01-24 @ 16:10)  HR: 89 (05-02-24 @ 04:00) (67 - 89)  BP: 129/76 (05-02-24 @ 04:00) (119/76 - 137/60)  RR: 18 (05-02-24 @ 04:00) (18 - 20)  SpO2: 99% (05-02-24 @ 04:00) (96% - 100%)  I&O's Summary      PHYSICAL EXAM:  GENERAL: NAD  HEAD:  Atraumatic, Normocephalic  EYES: EOMI, PERRLA, conjunctiva and sclera clear  ENMT: Moist mucous membranes  NECK: Supple, No JVD, trachea midline   NERVOUS SYSTEM:  Alert & Oriented X3, Good concentration; Motor Strength 5/5 B/L upper and lower extremities; DTRs 2+ intact and symmetric  CHEST/LUNG: Clear to auscultation bilaterally; No rales, rhonchi, wheezing, or rubs  HEART: Regular rate and rhythm; No murmurs, rubs, or gallops  ABDOMEN: Soft, Nontender, Nondistended; Bowel sounds present  EXTREMITIES:  2+ Peripheral Pulses, No clubbing, cyanosis, or edema  LYMPH: No lymphadenopathy noted  SKIN: No rashes or lesions    MEDICATIONS  (STANDING):  gabapentin 800 milliGRAM(s) Oral every 12 hours  lactated ringers. 1000 milliLiter(s) (100 mL/Hr) IV Continuous <Continuous>  levothyroxine 50 MICROGram(s) Oral daily  loratadine 10 milliGRAM(s) Oral daily  piperacillin/tazobactam IVPB.. 3.375 Gram(s) IV Intermittent every 8 hours  polyethylene glycol 3350 17 Gram(s) Oral daily  senna 2 Tablet(s) Oral at bedtime    MEDICATIONS  (PRN):  acetaminophen     Tablet .. 650 milliGRAM(s) Oral every 6 hours PRN Temp greater or equal to 38C (100.4F), Mild Pain (1 - 3)  oxyCODONE    IR 5 milliGRAM(s) Oral every 4 hours PRN Moderate Pain (4 - 6)      LABS:        CAPILLARY BLOOD GLUCOSE          RADIOLOGY & ADDITIONAL TESTS:               *******************************  Alejandra Mota MD (PGY-1)  Internal Medicine  Contact via [employer] TEAMS  *******************************    JUSTIN KEYES  83y  Female    Patient is a 83y old  Female who presents with a chief complaint of Sepsis, perforated gallbladder, metastatic Ovarian CA (01 May 2024 07:00)    Subjective: No acute events overnight. Patient seen at bedside this morning. Noted continued SOB as well as ab pain when she coughs. Denied any fever, chills, chest pain, n/v.     Objective:  T(C): 36.7 (05-02-24 @ 04:00), Max: 37.1 (05-01-24 @ 16:10)  HR: 89 (05-02-24 @ 04:00) (67 - 89)  BP: 129/76 (05-02-24 @ 04:00) (119/76 - 137/60)  RR: 18 (05-02-24 @ 04:00) (18 - 20)  SpO2: 99% (05-02-24 @ 04:00) (96% - 100%)  I&O's Summary    PHYSICAL EXAM:  GENERAL: NAD, obese  HEAD:  Atraumatic, Normocephalic  EYES: EOMI, PERRLA, conjunctiva and sclera clear  ENMT: Moist mucous membranes  NECK: Supple, No JVD, trachea midline   NERVOUS SYSTEM:  Alert & Oriented X3, Good concentration  CHEST/LUNG: mild wheezing; No rales, rhonchi, wheezing, or rubs  HEART: Regular rate and rhythm; No murmurs, rubs, or gallops  ABDOMEN: Soft, Nontender, Nondistended; Bowel sounds present  EXTREMITIES:  2+ Peripheral Pulses, No clubbing, cyanosis, or edema  SKIN: No rashes or lesions    MEDICATIONS  (STANDING):  gabapentin 800 milliGRAM(s) Oral every 12 hours  lactated ringers. 1000 milliLiter(s) (100 mL/Hr) IV Continuous <Continuous>  levothyroxine 50 MICROGram(s) Oral daily  loratadine 10 milliGRAM(s) Oral daily  piperacillin/tazobactam IVPB.. 3.375 Gram(s) IV Intermittent every 8 hours  polyethylene glycol 3350 17 Gram(s) Oral daily  senna 2 Tablet(s) Oral at bedtime    MEDICATIONS  (PRN):  acetaminophen     Tablet .. 650 milliGRAM(s) Oral every 6 hours PRN Temp greater or equal to 38C (100.4F), Mild Pain (1 - 3)  oxyCODONE    IR 5 milliGRAM(s) Oral every 4 hours PRN Moderate Pain (4 - 6)    LABS:                        10.6   13.12 )-----------( 128      ( 02 May 2024 07:03 )             33.5     05-02    140  |  104  |  25<H>  ----------------------------<  87  4.0   |  25  |  0.58    Ca    8.9      02 May 2024 07:03  Phos  1.9     05-02  Mg     2.2     05-02    TPro  6.4  /  Alb  2.9<L>  /  TBili  0.8  /  DBili  x   /  AST  56<H>  /  ALT  51<H>  /  AlkPhos  123<H>  05-02        PT/INR - ( 30 Apr 2024 21:30 )   PT: 12.3 sec;   INR: 1.18 ratio    PTT - ( 30 Apr 2024 21:30 )  PTT:23.1 sec    CAPILLARY BLOOD GLUCOSE    Culture Results:   <10,000 CFU/mL Normal Urogenital Ijeoma (04-30 @ 23:24)  Culture Results:   No growth at 24 hours (04-30 @ 21:17)  Culture Results:   No growth at 24 hours (04-30 @ 21:00)    RADIOLOGY & ADDITIONAL TESTS:  < from: MR MRCP w/wo IV Cont (05.01.24 @ 22:29) >  A 5.3 cm LEFT ovarian/adnexal mass with multiple omental and peritoneal   deposits throughout the abdomen and pelvis, highly suspicious for primary   gynecologic malignancy with peritoneal and omental carcinomatosis.   Recommend gynecologic/oncologic consult and consider tissue sampling for   confirmation.    Cholelithiasis with discontinuity of the inflamed gallbladder wall   contiguous with a 4.3 cm multiloculated collection in the adjacent liver   in segment 5, most compatible with perforated cholecystitis with   intrahepatic abscess. Given additional MR findings highly suspicious for   malignancy, an underlying malignant/metastatic liver lesion cannot be   entirely excluded. Multiple serosal implants/tumor deposits along the   surface of the liver.    Choledocholithiasis resulting in biliary ductal dilatation.    Endometrium is distended to 1.4 cm with nonenhancing T1 hyperintense   material, favoring hemorrhagic debris versus less likely underlying   lesion.    < end of copied text >

## 2024-05-02 NOTE — DIETITIAN INITIAL EVALUATION ADULT - PERTINENT MEDS FT
MEDICATIONS  (STANDING):  albuterol/ipratropium for Nebulization 3 milliLiter(s) Nebulizer every 6 hours  gabapentin 800 milliGRAM(s) Oral every 12 hours  levothyroxine 50 MICROGram(s) Oral daily  loratadine 10 milliGRAM(s) Oral daily  piperacillin/tazobactam IVPB.. 3.375 Gram(s) IV Intermittent every 8 hours  polyethylene glycol 3350 17 Gram(s) Oral daily  senna 2 Tablet(s) Oral at bedtime    MEDICATIONS  (PRN):  acetaminophen     Tablet .. 650 milliGRAM(s) Oral every 6 hours PRN Temp greater or equal to 38C (100.4F), Mild Pain (1 - 3)  oxyCODONE    IR 5 milliGRAM(s) Oral every 4 hours PRN Moderate Pain (4 - 6)

## 2024-05-02 NOTE — PROGRESS NOTE ADULT - PROBLEM SELECTOR PLAN 2
CT findings c/f cholelithiasis with perforation and ?absccess    - F/u HIDA scan  - pt tentatively scheduled for percutaneous cholecystostomy tube w/ IR if HIDA scan positive  - gen surg consulted -> recommend further MRI abd/pelvis for eval. not a surgical candidate at this time   -f/u MRCP/MR abdomen  -ABx coverage as above CT findings c/f cholelithiasis with perforation and possible abscess. HIDA scan negative. MRCP shows   Choledocholithiasis resulting in biliary ductal dilatation. Also w/ cholelithiasis with discontinuity of the inflamed gallbladder wall contiguous with a 4.3 cm multiloculated collection that is most compatible with perforated cholecystitis with intrahepatic abscess.     - gen surg consulted -> recommend further MRI abd/pelvis for eval. not a surgical candidate at this time   - f/u IR recs   -ABx coverage as above

## 2024-05-02 NOTE — PROGRESS NOTE ADULT - PROBLEM SELECTOR PLAN 3
CT A/P suggestive of right ovarian malignancy w/ carcinomatosis.     -will pursue MR pelvis  -f/u  tumor markers  -GYN onc consult once imaging is performed CT A/P suggestive of ovarian malignancy w/ carcinomatosis. 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     -f/u tumor markers  -GYN onc consulted, appreciate recs

## 2024-05-02 NOTE — DIETITIAN INITIAL EVALUATION ADULT - PROBLEM SELECTOR PLAN 3
poss abdominal mets found on imaging  -will pursue MR pelvis  -check tumor markers  -GYN consult in AM  - unclear if malignancy or sequela of perforated gallbladder

## 2024-05-02 NOTE — PROGRESS NOTE ADULT - ASSESSMENT
83 female history of COPD not on O2, obesity, HTN, CHF, BCC, hypothyroidism, psoriatic arthritis SUZIE BIBEMS for acute onset SOB, admitted for sepsis secondary to perforated gallbladder. Also found to have complex cystic liver lesion and right ovarian neoplasm w/ carcinomatosis. Hospital course complicated w/ AHRF, unclear etiology but likely related to ongoing abdominal pathology.  83 female history of COPD not on O2, obesity, HTN, CHF, BCC, hypothyroidism, psoriatic arthritis SUZIE BIBEMS for acute onset SOB, admitted for sepsis secondary to perforated gallbladder. Also found to have complex cystic liver lesion and right ovarian neoplasm w/ carcinomatosis. Hospital course complicated w/ AHRF, unclear etiology but likely related to ongoing abdominal pathology vs COPD exacerbation.

## 2024-05-02 NOTE — DIETITIAN INITIAL EVALUATION ADULT - PROBLEM SELECTOR PLAN 7
DVT PPx: restart asa and DVT ppx after IR procedure  Diet: NPO  Bowel Regimen: Miralax/Senna  Code: DNR/DNI   Dispo: Pending Hospital Course    --------------------------------------------------------------  Jamar Mroa MD  PGY-2, Internal Medicine  [employer] Teams preferred  --------------------------------------------------------------

## 2024-05-02 NOTE — DIETITIAN INITIAL EVALUATION ADULT - REASON INDICATOR FOR ASSESSMENT
Nutrition Consult for pressure injury stage 2 or > & MST score 2 or > (decreased appetite, unintentional weight loss).   Source: Pt, pt's son at bedside, [facility] paper chart, Electronic Medical Record.   Chart reviewed, events noted.

## 2024-05-02 NOTE — DIETITIAN INITIAL EVALUATION ADULT - PROBLEM SELECTOR PLAN 1
Meets SIRS for sepsis (leukocytosis, tachypnea, temperature, HR) 2/2 acute dimple c/b perforation  - c/w Zosyn   -f/u blood and urine Cx  -IVF with 100 cc LR x 10 hours for now. reevaluated vital signs after 1L.  -trend WBC and fever curve

## 2024-05-02 NOTE — DIETITIAN INITIAL EVALUATION ADULT - OTHER INFO
- UBW: 246 pounds (per [facility] chart); pt denies any recent weight changes.   - Dosing wt: 245.5 pounds (5/01)  - No weight hx available per chart review at this time.   - RD to continue to monitor weight trends as able.   - Nutritionally Pertinent Meds in-house: synthroid.  - Nutritionally Pertinent Labs: Low Phos - ordered for sodium phosphate.   - Cardio: hx HTN, CHF.  - Pulm: Acute hypoxic respiratory failure; hx COPD.   - GI: Perforated gallbladder; ordered for Abx.   - Heme/Onc: Malignant ovarian neoplasm - pending GYN onc recommendations.

## 2024-05-02 NOTE — DIETITIAN INITIAL EVALUATION ADULT - PERTINENT LABORATORY DATA
05-02    140  |  104  |  25<H>  ----------------------------<  87  4.0   |  25  |  0.58    Ca    8.9      02 May 2024 07:03  Phos  1.9     05-02  Mg     2.2     05-02    TPro  6.4  /  Alb  2.9<L>  /  TBili  0.8  /  DBili  x   /  AST  56<H>  /  ALT  51<H>  /  AlkPhos  123<H>  05-02   39

## 2024-05-02 NOTE — PROGRESS NOTE ADULT - PROBLEM SELECTOR PLAN 1
Met sepsis criteria w/ leukocytosis, tachypnea, fever and tachycardia. Likely secondary to perforated gallbladder seen on CT scan. U/A, RVP negative. CTA neg for PE.     - continue w/ zosyn   - f/u blood and urine Cx  - continue w/ IVF with 100 cc LR x 10 hours given pt currently NPO   - trend WBC and fever curve Met sepsis criteria w/ leukocytosis, tachypnea, fever and tachycardia. Likely secondary to perforated gallbladder seen on CT scan. MRCP findings suggestive of perforated cholecystitis with intrahepatic abscess. U/A, RVP negative. CTA neg for PE. Blood and urine cultures negative.     - continue w/ zosyn   - continue NPO pending further recs from IR if drain placement is needed   - trend WBC and fever curve

## 2024-05-02 NOTE — DIETITIAN INITIAL EVALUATION ADULT - ORAL INTAKE PTA/DIET HISTORY
- Pt reports having a good appetite and PO intake at baseline PTA; decreased appetite x few days PTA in setting of N/V reported. Per [facility] paper chart, pt on Low fat/cholesterol, No salt added, No concentrated sweets diet.   - Pt confirms intolerance to onions, milk (uses soy milk).   - No micronutrient supplementation at home.   - No difficulty chewing/swallowing at this time.

## 2024-05-02 NOTE — DIETITIAN INITIAL EVALUATION ADULT - PROBLEM SELECTOR PLAN 4
- CT neg for pna and doesn't explain pt's symptoms  -likely compensatory based on abdominal pathology  -wean oxygen as tolerated

## 2024-05-03 DIAGNOSIS — C56.2 MALIGNANT NEOPLASM OF LEFT OVARY: ICD-10-CM

## 2024-05-03 LAB
ALBUMIN SERPL ELPH-MCNC: 3.1 G/DL — LOW (ref 3.3–5)
ALP SERPL-CCNC: 109 U/L — SIGNIFICANT CHANGE UP (ref 40–120)
ALT FLD-CCNC: 33 U/L — SIGNIFICANT CHANGE UP (ref 10–45)
ANION GAP SERPL CALC-SCNC: 12 MMOL/L — SIGNIFICANT CHANGE UP (ref 5–17)
APTT BLD: 23.1 SEC — LOW (ref 24.5–35.6)
AST SERPL-CCNC: 25 U/L — SIGNIFICANT CHANGE UP (ref 10–40)
BASOPHILS # BLD AUTO: 0.02 K/UL — SIGNIFICANT CHANGE UP (ref 0–0.2)
BASOPHILS NFR BLD AUTO: 0.2 % — SIGNIFICANT CHANGE UP (ref 0–2)
BILIRUB SERPL-MCNC: 0.7 MG/DL — SIGNIFICANT CHANGE UP (ref 0.2–1.2)
BLD GP AB SCN SERPL QL: NEGATIVE — SIGNIFICANT CHANGE UP
BUN SERPL-MCNC: 16 MG/DL — SIGNIFICANT CHANGE UP (ref 7–23)
CALCIUM SERPL-MCNC: 8.8 MG/DL — SIGNIFICANT CHANGE UP (ref 8.4–10.5)
CANCER AG125 SERPL-ACNC: 801 U/ML — HIGH
CANCER AG19-9 SERPL-ACNC: 110 U/ML — HIGH
CEA SERPL-MCNC: 4.2 NG/ML — HIGH (ref 0–3.8)
CHLORIDE SERPL-SCNC: 101 MMOL/L — SIGNIFICANT CHANGE UP (ref 96–108)
CO2 SERPL-SCNC: 26 MMOL/L — SIGNIFICANT CHANGE UP (ref 22–31)
CREAT SERPL-MCNC: 0.55 MG/DL — SIGNIFICANT CHANGE UP (ref 0.5–1.3)
EGFR: 91 ML/MIN/1.73M2 — SIGNIFICANT CHANGE UP
EOSINOPHIL # BLD AUTO: 0.36 K/UL — SIGNIFICANT CHANGE UP (ref 0–0.5)
EOSINOPHIL NFR BLD AUTO: 2.7 % — SIGNIFICANT CHANGE UP (ref 0–6)
GLUCOSE SERPL-MCNC: 113 MG/DL — HIGH (ref 70–99)
HCT VFR BLD CALC: 31.7 % — LOW (ref 34.5–45)
HGB BLD-MCNC: 10 G/DL — LOW (ref 11.5–15.5)
IMM GRANULOCYTES NFR BLD AUTO: 1.7 % — HIGH (ref 0–0.9)
INR BLD: 1.28 RATIO — HIGH (ref 0.85–1.18)
LYMPHOCYTES # BLD AUTO: 1.05 K/UL — SIGNIFICANT CHANGE UP (ref 1–3.3)
LYMPHOCYTES # BLD AUTO: 7.9 % — LOW (ref 13–44)
MAGNESIUM SERPL-MCNC: 2.1 MG/DL — SIGNIFICANT CHANGE UP (ref 1.6–2.6)
MCHC RBC-ENTMCNC: 28.5 PG — SIGNIFICANT CHANGE UP (ref 27–34)
MCHC RBC-ENTMCNC: 31.5 GM/DL — LOW (ref 32–36)
MCV RBC AUTO: 90.3 FL — SIGNIFICANT CHANGE UP (ref 80–100)
MONOCYTES # BLD AUTO: 1.56 K/UL — HIGH (ref 0–0.9)
MONOCYTES NFR BLD AUTO: 11.8 % — SIGNIFICANT CHANGE UP (ref 2–14)
MRSA PCR RESULT.: DETECTED
NEUTROPHILS # BLD AUTO: 10.04 K/UL — HIGH (ref 1.8–7.4)
NEUTROPHILS NFR BLD AUTO: 75.7 % — SIGNIFICANT CHANGE UP (ref 43–77)
NRBC # BLD: 0 /100 WBCS — SIGNIFICANT CHANGE UP (ref 0–0)
PHOSPHATE SERPL-MCNC: 3.8 MG/DL — SIGNIFICANT CHANGE UP (ref 2.5–4.5)
PLATELET # BLD AUTO: 131 K/UL — LOW (ref 150–400)
POTASSIUM SERPL-MCNC: 3.5 MMOL/L — SIGNIFICANT CHANGE UP (ref 3.5–5.3)
POTASSIUM SERPL-SCNC: 3.5 MMOL/L — SIGNIFICANT CHANGE UP (ref 3.5–5.3)
PROT SERPL-MCNC: 6.3 G/DL — SIGNIFICANT CHANGE UP (ref 6–8.3)
PROTHROM AB SERPL-ACNC: 13.3 SEC — HIGH (ref 9.5–13)
RBC # BLD: 3.51 M/UL — LOW (ref 3.8–5.2)
RBC # FLD: 15.1 % — HIGH (ref 10.3–14.5)
RH IG SCN BLD-IMP: POSITIVE — SIGNIFICANT CHANGE UP
S AUREUS DNA NOSE QL NAA+PROBE: DETECTED
SODIUM SERPL-SCNC: 139 MMOL/L — SIGNIFICANT CHANGE UP (ref 135–145)
WBC # BLD: 13.25 K/UL — HIGH (ref 3.8–10.5)
WBC # FLD AUTO: 13.25 K/UL — HIGH (ref 3.8–10.5)

## 2024-05-03 PROCEDURE — 47490 INCISION OF GALLBLADDER: CPT

## 2024-05-03 PROCEDURE — 99221 1ST HOSP IP/OBS SF/LOW 40: CPT | Mod: GC

## 2024-05-03 PROCEDURE — 99233 SBSQ HOSP IP/OBS HIGH 50: CPT | Mod: GC

## 2024-05-03 RX ORDER — POTASSIUM CHLORIDE 20 MEQ
10 PACKET (EA) ORAL
Refills: 0 | Status: COMPLETED | OUTPATIENT
Start: 2024-05-03 | End: 2024-05-03

## 2024-05-03 RX ORDER — MUPIROCIN 20 MG/G
1 OINTMENT TOPICAL
Refills: 0 | Status: COMPLETED | OUTPATIENT
Start: 2024-05-03 | End: 2024-05-08

## 2024-05-03 RX ADMIN — Medication 3 MILLILITER(S): at 11:27

## 2024-05-03 RX ADMIN — PIPERACILLIN AND TAZOBACTAM 25 GRAM(S): 4; .5 INJECTION, POWDER, LYOPHILIZED, FOR SOLUTION INTRAVENOUS at 21:58

## 2024-05-03 RX ADMIN — Medication 0.25 MILLIGRAM(S): at 05:13

## 2024-05-03 RX ADMIN — PIPERACILLIN AND TAZOBACTAM 25 GRAM(S): 4; .5 INJECTION, POWDER, LYOPHILIZED, FOR SOLUTION INTRAVENOUS at 13:45

## 2024-05-03 RX ADMIN — Medication 100 MILLIEQUIVALENT(S): at 08:02

## 2024-05-03 RX ADMIN — Medication 650 MILLIGRAM(S): at 00:59

## 2024-05-03 RX ADMIN — Medication 0.25 MILLIGRAM(S): at 17:03

## 2024-05-03 RX ADMIN — Medication 3 MILLILITER(S): at 05:14

## 2024-05-03 RX ADMIN — GABAPENTIN 800 MILLIGRAM(S): 400 CAPSULE ORAL at 17:03

## 2024-05-03 RX ADMIN — CHLORHEXIDINE GLUCONATE 1 APPLICATION(S): 213 SOLUTION TOPICAL at 11:25

## 2024-05-03 RX ADMIN — Medication 3 MILLILITER(S): at 17:04

## 2024-05-03 RX ADMIN — OXYCODONE HYDROCHLORIDE 5 MILLIGRAM(S): 5 TABLET ORAL at 18:00

## 2024-05-03 RX ADMIN — Medication 50 MICROGRAM(S): at 05:13

## 2024-05-03 RX ADMIN — SENNA PLUS 2 TABLET(S): 8.6 TABLET ORAL at 21:57

## 2024-05-03 RX ADMIN — Medication 100 MILLIEQUIVALENT(S): at 10:20

## 2024-05-03 RX ADMIN — OXYCODONE HYDROCHLORIDE 5 MILLIGRAM(S): 5 TABLET ORAL at 17:04

## 2024-05-03 RX ADMIN — Medication 650 MILLIGRAM(S): at 23:00

## 2024-05-03 RX ADMIN — PIPERACILLIN AND TAZOBACTAM 25 GRAM(S): 4; .5 INJECTION, POWDER, LYOPHILIZED, FOR SOLUTION INTRAVENOUS at 05:13

## 2024-05-03 RX ADMIN — GABAPENTIN 800 MILLIGRAM(S): 400 CAPSULE ORAL at 05:13

## 2024-05-03 RX ADMIN — MUPIROCIN 1 APPLICATION(S): 20 OINTMENT TOPICAL at 17:04

## 2024-05-03 RX ADMIN — POLYETHYLENE GLYCOL 3350 17 GRAM(S): 17 POWDER, FOR SOLUTION ORAL at 11:26

## 2024-05-03 RX ADMIN — Medication 650 MILLIGRAM(S): at 21:58

## 2024-05-03 RX ADMIN — LORATADINE 10 MILLIGRAM(S): 10 TABLET ORAL at 11:25

## 2024-05-03 NOTE — PROGRESS NOTE ADULT - PROBLEM SELECTOR PLAN 7
DVT PPx: Lovenox 40 mg qd  Diet: NPO for now pending further IR recs   Bowel Regimen: Miralax/Senna  Code: will need to discuss w/ pt and son. pt reported DNR/DNI but would like to go over w/ son prior to filling out MOLST form.    Dispo: Pending Hospital Course DVT PPx: Lovenox 40 mg qd  Diet: NPO for perc dimple  Bowel Regimen: Miralax/Senna  Code: DNR/DNI w/ trial of intubation   Dispo: Pending Hospital Course

## 2024-05-03 NOTE — PROGRESS NOTE ADULT - PROBLEM SELECTOR PLAN 1
Met sepsis criteria w/ leukocytosis, tachypnea, fever and tachycardia. Likely secondary to perforated gallbladder seen on CT scan. MRCP findings suggestive of perforated cholecystitis with intrahepatic abscess. U/A, RVP negative. CTA neg for PE. Blood and urine cultures negative.     - continue w/ zosyn   - continue NPO pending further recs from IR if drain placement is needed   - trend WBC and fever curve Met sepsis criteria w/ leukocytosis, tachypnea, fever and tachycardia. Likely secondary to perforated gallbladder seen on CT scan. MRCP findings suggestive of perforated cholecystitis with intrahepatic abscess. U/A, RVP negative. CTA neg for PE. Blood and urine cultures negative.     - continue w/ zosyn   - scheduled for perc dimple w/ drain today   - trend WBC and fever curve

## 2024-05-03 NOTE — CONSULT NOTE ADULT - SUBJECTIVE AND OBJECTIVE BOX
HPI:  83 female history of COPD not on O2, obesity, HTN, CHF, BCC, hypothyroidism, psoriatic arthritis SUZIE BIBEMS for acute onset SOB and found to have sepsis secondary to perforated gallbladder with MRI/MRCP noting a 5.3 cm LEFT ovarian/adnexal mass with multiple omental and peritoneal deposits throughout the abdomen and pelvis, highly suspicious for primary   gynecologic malignancy with peritoneal and omental carcinomatosis; cholelithiasis with discontinuity of the inflamed gallbladder wall contiguous with a 4.3 cm multiloculated collection in the adjacent liver in segment 5, most compatible with perforated cholecystitis with intrahepatic abscess and Choledocholithiasis resulting in biliary ductal dilatation prompting GI consult.       Allergies:  Milk (Rash)  latex (Rash)  erythromycin (Rash)  phenobarbital (Rash)        Hospital Medications:  acetaminophen     Tablet .. 650 milliGRAM(s) Oral every 6 hours PRN  albuterol/ipratropium for Nebulization 3 milliLiter(s) Nebulizer every 6 hours  buDESOnide    Inhalation Suspension 0.25 milliGRAM(s) Inhalation two times a day  chlorhexidine 4% Liquid 1 Application(s) Topical daily  gabapentin 800 milliGRAM(s) Oral every 12 hours  levothyroxine 50 MICROGram(s) Oral daily  loratadine 10 milliGRAM(s) Oral daily  mupirocin 2% Nasal 1 Application(s) Both Nostrils two times a day  oxyCODONE    IR 5 milliGRAM(s) Oral every 4 hours PRN  piperacillin/tazobactam IVPB.. 3.375 Gram(s) IV Intermittent every 8 hours  polyethylene glycol 3350 17 Gram(s) Oral daily  senna 2 Tablet(s) Oral at bedtime      PMHX/PSHX:  Hypothyroid    Chronic bronchitis    Psoriatic arthritis    Osteoarthritis    Overactive bladder    Basal cell carcinoma    Squamous cell carcinoma    History of carpal tunnel release    H/O total shoulder replacement, left    H/O:         Family history:  Family history of CHF (congestive heart failure) (Father)    Family history of hypertension in mother (Mother)        Social History: no smoking    ROS:   General:  No fevers, chills or night sweats  ENT:  No sore throat or dysphagia  CV:  No pain or palpitations  Resp:  No dyspnea, cough or  wheezing  GI:  as above  Skin:  No rash or edema  Neuro: no weakness   Hematologic: no bleeding  Musculoskeletal: no muscle pain or join pain  Psych: no agitation     : no dysuria      PHYSICAL EXAM:   GENERAL:  NAD, ill appearing  HEENT:  NC/AT,  conjunctivae clear and pink, sclera -anicteric  CHEST:  increase work of breathing  HEART:  RRR S1/S2,  ABDOMEN:  Soft, mild TTP  EXTREMITIES:  No cyanosis or Edema  SKIN:  Warm & Dry. No rash or erythema  NEURO:  Alert, oriented, no focal deficit    Vital Signs:  Vital Signs Last 24 Hrs  T(C): 36.6 (03 May 2024 09:05), Max: 38.1 (03 May 2024 00:09)  T(F): 97.9 (03 May 2024 09:05), Max: 100.5 (03 May 2024 00:09)  HR: 86 (03 May 2024 10:30) (85 - 98)  BP: 135/61 (03 May 2024 09:05) (128/61 - 142/83)  BP(mean): --  RR: 18 (03 May 2024 10:30) (18 - 20)  SpO2: 98% (03 May 2024 10:30) (96% - 98%)    Parameters below as of 03 May 2024 10:30  Patient On (Oxygen Delivery Method): nasal cannula, 2LPM      Daily Height in cm: 149.9 (03 May 2024 09:05)    Daily     LABS:                        10.0   13.25 )-----------( 131      ( 03 May 2024 04:21 )             31.7     Mean Cell Volume: 90.3 fl (24 @ 04:21)    -    139  |  101  |  16  ----------------------------<  113<H>  3.5   |  26  |  0.55    Ca    8.8      03 May 2024 04:21  Phos  3.8     05-  Mg     2.1     -    TPro  6.3  /  Alb  3.1<L>  /  TBili  0.7  /  DBili  x   /  AST  25  /  ALT  33  /  AlkPhos  109  05-03    LIVER FUNCTIONS - ( 03 May 2024 04:21 )  Alb: 3.1 g/dL / Pro: 6.3 g/dL / ALK PHOS: 109 U/L / ALT: 33 U/L / AST: 25 U/L / GGT: x           PT/INR - ( 03 May 2024 04:21 )   PT: 13.3 sec;   INR: 1.28 ratio         PTT - ( 03 May 2024 04:21 )  PTT:23.1 sec  Urinalysis Basic - ( 03 May 2024 04:21 )    Color: x / Appearance: x / SG: x / pH: x  Gluc: 113 mg/dL / Ketone: x  / Bili: x / Urobili: x   Blood: x / Protein: x / Nitrite: x   Leuk Esterase: x / RBC: x / WBC x   Sq Epi: x / Non Sq Epi: x / Bacteria: x                              10.0   13.25 )-----------( 131      ( 03 May 2024 04:21 )             31.7                         10.6   13.12 )-----------( 128      ( 02 May 2024 07:03 )             33.5                         9.9    15.48 )-----------( 124      ( 2024 21:30 )             31.7     Imaging:  < from: MR MRCP w/wo IV Cont (24 @ 22:29) >  IMPRESSION:  A 5.3 cm LEFT ovarian/adnexal mass with multiple omental and peritoneal   deposits throughout the abdomen and pelvis, highly suspicious for primary   gynecologic malignancy with peritoneal and omental carcinomatosis.   Recommend gynecologic/oncologic consult and consider tissue sampling for   confirmation.    Cholelithiasis with discontinuity of the inflamed gallbladder wall   contiguous with a 4.3 cm multiloculated collection in the adjacent liver   in segment 5, most compatible with perforated cholecystitis with   intrahepatic abscess. Given additional MR findings highly suspicious for   malignancy, an underlying malignant/metastatic liver lesion cannot be   entirely excluded. Multiple serosal implants/tumor deposits along the   surface of the liver.    Choledocholithiasis resulting in biliary ductal dilatation.    Endometrium is distended to 1.4 cm with nonenhancing T1 hyperintense   material, favoring hemorrhagic debris versus less likely underlying   lesion.    < end of copied text >

## 2024-05-03 NOTE — CONSULT NOTE ADULT - ATTENDING COMMENTS
As above  Patient seen and examined on 5/3/2024 in the early evening.    Impression:    #1.  Perforated cholecystitis resulting in hepatic abscess, status post interventional radiology drainage on 5/3/2024    #2.  Consulted for incidental finding of asymptomatic choledocholithiasis on CT imaging.    Recommendation:    #1.  Follow CBC/LFTs    #2.  IV antibiotics for problem #1    #3.  Eventual nonurgent ERCP to prevent complications of choledocholithiasis.  Timing will be likely near the end of this hospitalization.    Discussed with patient and son at bedside.

## 2024-05-03 NOTE — CONSULT NOTE ADULT - SUBJECTIVE AND OBJECTIVE BOX
Interventional Radiology    Evaluate for Procedure: Omental/Peritoneal Tissue Bx    HPI: 83 female history of COPD not on O2, obesity, HTN, CHF, BCC, hypothyroidism, psoriatic arthritis SUZIE BIBEMS for acute onset SOB.  Pt referred from [facility] where she has had increased work of breathing for 1 day.  Per ED report, patient was diagnosed recently with left-sided pneumonia, receiving IV antibiotics and IVF at the facility.  She reports weakness along with emesis around 5 days ago improved though she still reports poor appetite. She also reports right-sided abdominal pain and a dry cough which she suspects is related to allergies. Denies CP, sore throat, nausea, urinary/bowel changes except constipation, numbness/tingling, leg swelling.     In the ED, patient found to be febrile to 38.4, HR 94, maintained on NC, given CTX, APAP, Nebs, with urine and blood cultures sent. 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 Moderate volume ascites. Trace left pleural effusion.     Pt planned for perc dimple today with IR. IR reconsulted for omental/peritoneal bx iso possible GYN malignancy.    Allergies: latex (Rash)  erythromycin (Rash)  phenobarbital (Rash)    Medications (Abx/Cardiac/Anticoagulation/Blood Products)    piperacillin/tazobactam IVPB.-: 25 mL/Hr IV Intermittent (05-01 @ 16:17)  piperacillin/tazobactam IVPB..: 25 mL/Hr IV Intermittent (05-03 @ 05:13)    Data:  149.9  111.4  T(C): 36.6  HR: 86  BP: 135/61  RR: 18  SpO2: 98%    -WBC 13.25 / HgB 10.0 / Hct 31.7 / Plt 131  -Na 139 / Cl 101 / BUN 16 / Glucose 113  -K 3.5 / CO2 26 / Cr 0.55  -ALT 33 / Alk Phos 109 / T.Bili 0.7  -INR 1.28 / PTT 23.1    Assessment/Plan: 83 female history of COPD not on O2, obesity, HTN, CHF, BCC, hypothyroidism, psoriatic arthritis SUZIE BIBEMS for acute onset SOB.  Pt referred from [facility] where she has had increased work of breathing for 1 day.  Per ED report, patient was diagnosed recently with left-sided pneumonia, receiving IV antibiotics and IVF at the facility.  She reports weakness along with emesis around 5 days ago improved though she still reports poor appetite. She also reports right-sided abdominal pain and a dry cough which she suspects is related to allergies. Denies CP, sore throat, nausea, urinary/bowel changes except constipation, numbness/tingling, leg swelling.     In the ED, patient found to be febrile to 38.4, HR 94, maintained on NC, given CTX, APAP, Nebs, with urine and blood cultures sent. 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 Moderate volume ascites. Trace left pleural effusion.     Pt planned for perc dimple today with IR. IR reconsulted for omental/peritoneal bx iso possible GYN malignancy.      - Imaging and case discussed with GYN, who are amenable for possible bx to be done in an outpatient setting. Please have the pt call IR booking office at  to schedule outpatient telehealth visit to further discuss possible bx.   - Discussed with Dr. Jose M Leon.  - d/w primary team

## 2024-05-03 NOTE — PRE-ANESTHESIA EVALUATION ADULT - NSANTHADDINFOFT_GEN_ALL_CORE
Patient and family member agree with using Resuscitation methods except for Chest compressions during the IR case

## 2024-05-03 NOTE — CONSULT NOTE ADULT - ASSESSMENT
83 female history of COPD not on O2, obesity, HTN, CHF, BCC, hypothyroidism, psoriatic arthritis SUZIE BIBEMS for acute onset SOB and found to have sepsis secondary to perforated gallbladder with MRI/MRCP noting a 5.3 cm LEFT ovarian/adnexal mass with multiple omental and peritoneal deposits throughout the abdomen and pelvis, highly suspicious for primary   gynecologic malignancy with peritoneal and omental carcinomatosis; cholelithiasis with discontinuity of the inflamed gallbladder wall contiguous with a 4.3 cm multiloculated collection in the adjacent liver in segment 5, most compatible with perforated cholecystitis with intrahepatic abscess and Choledocholithiasis resulting in biliary ductal dilatation prompting GI consult.     #Perforated gallbladder with 4.3 cm multiloculated collection in the adjacent liver in segment 5  -Plan for drainage by IR    #CBD stone with CBD dilation  -normal bilirubin  #5.3 cm LEFT ovarian/adnexal mass with multiple omental and peritoneal deposits throughout the abdomen and pelvis, highly suspicious for primary   gynecologic malignancy with peritoneal and omental carcinomatosis    Recommendations:  -Agree with IV abx  -Can consider non-urgent ERCP pending medical optimization and further management of perforated gallbladder by surgery/IR team  -Trend liver enzymes  -Rest per primary team    Recommendations preliminary until signed by attending.     Kevin Martinez MD  Gastroenterology/Hepatology Fellow    [facility] Routine Consult 24/7: [email or web address] Consult 24/7: [email or web address]  For urgent consult during the weekends (all day) and Weeknights (5PM to 7 AM) please:  1. Contact on call GI team via page followed by TEAMS Call if no response  2. If no response, call the answering service ([phone])

## 2024-05-03 NOTE — PROCEDURE NOTE - PROCEDURE FINDINGS AND DETAILS
8.5 fr cholecystostomy tube placed. thick brown bile aspirated and sent for cx. drain connected to bag.

## 2024-05-03 NOTE — CONSULT NOTE ADULT - SUBJECTIVE AND OBJECTIVE BOX
Gyn Consult Note  JUSTIN KEYES  83y  Female [id number]    HPI:  83y F postmenopausal patient admitted from nursing home for increased work of breathing while on Tx for pneumonia, found to have perforated gallbladder. Incidental finding concerning for malignancy of gyn origin as detailed below. Patient reports not having need for GYN care for nearly 30 years, had uneventful menopause and never noticed any postmenopausal bleeding. She denies any history of early satiety or significant bloating. Does report 15 lb weight loss earlier this year, coinciding with multiple hospitalizations for UTI. Reports intermittent acid reflux with eating. Denies nausea, vomiting, constipation or diarrhea.     Of note patient admitted to [facility] last year (2023) relating to spinal stenosis. Had CT Pelvis at that time demonstrating unremarkable uterus and adnexa without any lymphadenopathy or peritoneal nodularity     Name of GYN Physician: None    OBHx:  P2  -  x1  - CS x1 (c/b placenta previa)   GYNHx: Last saw gyn in early . Normal menses, LMP approx 30y ago. No postmenopausal bleeding or abdominal pain. Unsure of pap screening hx.   PMH: COPD not on O2, morbid obesity, HTN, CHF, Basal Cell Carcinoma, hypothyroidism, psoriatic arthritis, SUZIE  PSH: CS x1  Meds:  Advair Diskus 250 mcg-50 mcg inhalation powder: 1 puff(s) inhaled 2 times a day (01 May 2024 06:02)  alendronate 70 mg oral tablet: 1 tab(s) orally once a week on Sundays (01 May 2024 06:02)  amLODIPine 5 mg oral tablet: 1 tab(s) orally once a day (01 May 2024 05:51)  Aspirin Enteric Coated 81 mg oral delayed release tablet: 1 tab(s) orally once a day (01 May 2024 06:02)  baclofen 10 mg oral tablet: 1 tab(s) orally 3 times a day, As Needed (01 May 2024 06:02)  Calcium 500+D oral tablet, chewable: 1 tab(s) orally 2 times a day (01 May 2024 06:02)  capsaicin 0.075% topical cream: 1 application topically 2 times a day (01 May 2024 06:02)  famotidine 20 mg oral tablet: 1 tab(s) orally 3 times a day (01 May 2024 06:02)  Flonase 50 mcg/inh nasal spray: 2 spray(s) nasal once a day (01 May 2024 06:02)  furosemide 20 mg oral tablet: 1 tab(s) orally once a day (01 May 2024 05:55)  gabapentin 800 mg oral tablet: 1 tab(s) orally 3 times a day (01 May 2024 08:50)  levothyroxine 50 mcg (0.05 mg) oral tablet: 1 tab(s) orally once a day (01 May 2024 06:02)  loratadine 10 mg oral tablet: 1 tab(s) orally once a day (01 May 2024 06:02)  metoprolol tartrate 25 mg oral tablet: 0.5 tab(s) orally every 12 hours (01 May 2024 05:53)  oxyBUTYnin 10 mg/24 hr oral tablet, extended release: 1 tab(s) orally once a day (01 May 2024 05:56)  potassium chloride 20 mEq oral tablet, extended release: 2 tab(s) orally once a day (01 May 2024 05:57)  pravastatin 10 mg oral tablet: 1 tab(s) orally once a day (at bedtime) (01 May 2024 08:49)  Singulair 10 mg oral tablet: 1 tab(s) orally once a day (01 May 2024 06:02)  sulfaSALAzine 500 mg oral delayed release tablet: 2 tab(s) orally every 12 hours (01 May 2024 05:58)  Synthroid 50 mcg (0.05 mg) oral tablet: 1 tab(s) orally once a day (01 May 2024 05:53)  All: Erythromycin, Phenobarbital   Fam: Paternal aunts with breast cancer, pancreatic cancer, Maternal aunt with breast cancer, 2 nieces with breast cancer   Soc: [facility], at baseline walks short distances with walker     accepts blood    Physical Exam:   ICU Vital Signs Last 24 Hrs  T(C): 36.6 (03 May 2024 09:05), Max: 38.1 (03 May 2024 00:09)  T(F): 97.9 (03 May 2024 09:05), Max: 100.5 (03 May 2024 00:09)  HR: 85 (03 May 2024 09:05) (85 - 98)  BP: 135/61 (03 May 2024 09:05) (128/61 - 142/83)  BP(mean): --  ABP: --  ABP(mean): --  RR: 19 (03 May 2024 09:05) (18 - 20)  SpO2: 97% (03 May 2024 04:16) (96% - 98%)    O2 Parameters below as of 03 May 2024 04:16  Patient On (Oxygen Delivery Method): BiPAP/CPAP      General: Lying in bed, on 2L NC  Abd: Soft, obese, mild tenderness in RUQ, no rebound, guarding or distension.   : Primafit in place, no bleeding on pads, patient declines internal pelvic exam at the time of evaluation     LABS:               10.0   13.25 )-----------( 131      ( 03 May 2024 04:21 )             31.7     05-03    139  |  101  |  16  ----------------------------<  113<H>  3.5   |  26  |  0.55    Ca    8.8      03 May 2024 04:21  Phos  3.8     05-03  Mg     2.1     05-03    TPro  6.3  /  Alb  3.1<L>  /  TBili  0.7  /  DBili  x   /  AST  25  /  ALT  33  /  AlkPhos  109  05-03    I&O's Detail    02 May 2024 07:01  -  03 May 2024 07:00  --------------------------------------------------------  IN:    IV PiggyBack: 200 mL  Total IN: 200 mL    OUT:    Voided (mL): 1150 mL  Total OUT: 1150 mL    Total NET: -950 mL        PT/INR - ( 03 May 2024 04:21 )   PT: 13.3 sec;   INR: 1.28 ratio         PTT - ( 03 May 2024 04:21 )  PTT:23.1 sec  Urinalysis Basic - ( 03 May 2024 04:21 )    Color: x / Appearance: x / SG: x / pH: x  Gluc: 113 mg/dL / Ketone: x  / Bili: x / Urobili: x   Blood: x / Protein: x / Nitrite: x   Leuk Esterase: x / RBC: x / WBC x   Sq Epi: x / Non Sq Epi: x / Bacteria: x        RADIOLOGY & ADDITIONAL STUDIES:  < from: MR Pelvis w/ IV Cont (24 @ 22:28) >  MR PELVIS IC   ORDERED BY:  SHARLENE CLANCY     ACC: [id number] EXAM:  MR MRCP WAW IC   ORDERED BY:  SHARLENE CLANCY     PROCEDURE DATE:  2024          INTERPRETATION:  CLINICAL INFORMATION: Right ovarian neoplasm.   Gallbladder perforation with questionable abscess.    COMPARISON: CTA chest, abdomen and pelvis earlier same day.    CONTRAST/COMPLICATIONS:  IV Contrast: Gadavist  10 cc administered   0 cc discarded  Oral Contrast: NONE  Complications: None reported at time of study completion    PROCEDURE:  MRI of the abdomen and pelvis was performed.  MRCP was obtained.  -    FINDINGS:  LOWER CHEST: Small left and trace right pleural effusions with adjacent   passive atelectasis in the left lower lobe, as on earlier same day CT.    LIVER/GALLBLADDER: Cholelithiasis in a nondistended, but inflamed   gallbladder, with gallbladder wall thickening and irregularity, most   compatible with gangrenous/perforated cholecystitis. Evidence of   perforated cholecystitis with discontinuity of the gallbladder wall at   the liver surface contiguous with a 4.3 x 2.7 x 4.2 cm multiloculated   fluid collection/abscess in segment 5 (abdomen 26:21). Remainder of the   liver demonstrates a couple thinly septated cysts including a 1.1 cm cyst   in the caudate and a 1.2 cm cyst in the lateral left lobe. Additionally,   multiple serosal tumor deposits along the liver surface, further   described below.    BILE DUCTS: Mild intra and extra hepatic biliary ductal dilatation with   sludge and multiple filling defects in the extrahepatic common bile duct,   compatible with stones.    SPLEEN: Within normal limits.  PANCREAS: Within normal limits.  ADRENALS: Within normal limits.  KIDNEYS/URETERS: A small left renal cyst.    Evaluation of the pelvic organs is limited by artifact.  BLADDER: Within normal limits.  REPRODUCTIVE ORGANS:  Uterus: Anteverted anteflexed measuring 10.8 x 3.7 x 5.7 cm.  Endometrium: Endometrium is distended to 1.4 cm at the fundus with T1   hyperintense signal without discrete enhancement, favoring hemorrhagic   debris versus less likely underlying lesion.  Right ovary: Not discretely visualized, possibly obscured by peritoneal   carcinomatosis.  Left ovary/adnexa: Replaced by a T2 heterogeneous ovoid mass measuring   4.2 x 3.8 x 5.3 cm, highly suspicious for neoplasm.    BOWEL: No bowel obstruction.  PERITONEUM: Small volume ascites, including small perihepatic, bilateral   paracolic gutters and in the deep pelvis. Diffuse peritoneal and omental   nodularity, compatible with carcinomatosis with references as follows:  In left upper quadrant inferior to the spleen measuring 7.4 x 2.1 cm   (abdomen 4:21).  Multiple tiny implants along the serosal surface of the liver with a   reference measuring1 cm (abdomen 4:27) at the inferior right lobe.  A horseshoe shaped deposit in the cul-de-sac measures 6.8 x 1.7 x 4 cm   (pelvis 5:16).  Multiple deposits in the bilateral adnexa.  Thick omental caking deep to the umbilicus.    VESSELS: Patent portal and hepatic veins.  RETROPERITONEUM/LYMPH NODES: No lymphadenopathy.  ABDOMINAL WALL: A small fat-containing right inguinal hernia.  BONES: Degenerative changes. A T10 vertebral body hemangioma.    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   Recommend gynecologic/oncologic consult and consider tissue sampling for   confirmation.    Cholelithiasis with discontinuity of the inflamed gallbladder wall   contiguous with a 4.3 cm multiloculated collection in the adjacent liver   in segment 5, most compatible with perforated cholecystitis with   intrahepatic abscess. Given additional MR findings highly suspicious for malignancy, an underlying malignant/metastatic liver lesion cannot be   entirely excluded. Multiple serosal implants/tumor deposits along the   surface of the liver.    Choledocholithiasis resulting in biliary ductal dilatation.    Endometrium is distended to 1.4 cm with nonenhancing T1 hyperintense   material, favoring hemorrhagic debris versus less likely underlying   lesion.    --- End of Report ---            MARCO ANGLIN MD; Attending Radiologist  This document has been electronically signed. May  2 2024 11:54AM    < end of copied text >    < from: CT Abdomen and Pelvis w/ IV Cont (24 @ 00:34) >    CT ABDOMEN AND PELVIS IC   ORDERED BY: CHINO MCGRATH     ACC: [id number] EXAM:  CT ANGIO CHEST PULM ART WAWIC   ORDERED BY: CHINO MCGRATH     PROCEDURE DATE:  2024          INTERPRETATION:  CLINICAL INFORMATION: Shortness of breath, hypoxia,   evaluate for PE, abdominal pains and fever, evaluate abdomen for pathology    COMPARISON: Renal ultrasound 2011.    CONTRAST/COMPLICATIONS:  IV Contrast: Omnipaque 350 (accession [id number]), IV contrast documented  in unlinked concurrent exam (accession [id number])  90 cc administered   10   cc discarded  Oral Contrast: NONE  Complications: None reported at time of study completion    PROCEDURE:  CT Angiography of the Chest was performed followed by portal venous phase   imaging of the Abdomen and Pelvis.  Sagittal and coronal reformats were performed as well as 3D (MIP)   reconstructions.    FINDINGS:  CHEST:  LUNGS AND LARGE AIRWAYS: Patent central airways. No pneumonia or edema.   Dependent atelectatic changes most prominent posterior aspect on the left.  PLEURA: Trace right and small left-sided pleural effusion.  VESSELS: No pulmonary embolus. No thoracic aortic aneurysm.   Atherosclerosis including of the coronary arteries.  HEART: Mild cardiomegaly. No pericardial effusion. Mitral annular   calcifications.  MEDIASTINUM AND BRANDEN: No lymphadenopathy.  CHEST WALL AND LOWER NECK: Unremarkable.    ABDOMEN AND PELVIS:  GALLBLADDER: Several gallstones in the gallbladder including extending   toward the neck. Concentric edematous gallbladder wall thickening.   Apparent small focal discontinuity of the fundal wall the gallbladder   (304, 43 and 604, 67). The gallbladder contents appear to extend through   this defect in the gallbladder fundal wall,communicating with a small   amount of pericholecystic fluid, and with a 2.7 x 3.6 x 3.2 cm TRV X AP X   CC irregular in contour complex low density mass in the overlying right   lobe of liver.  LIVER: 1.4 cm not well-defined hypodensity in the caudate lobe. Better   defined 1 cm small cystic lesion more anteriorly in the right lobe. Both   visible on image 34 of series 304. A few other scattered tiny   hypodensities.  BILE DUCTS: Normal caliber.  SPLEEN: Within normal limits.  PANCREAS: Within normal limits.  ADRENALS: Within normal limits.  KIDNEYS/URETERS: No hydronephrosis or renal stone.    BLADDER: Within normal limits.  REPRODUCTIVE ORGANS: 4.6 cm in greatest dimension complex cystic mass   expands the left ovary. The right ovary is not identified separate from   numerous soft tissue density and smaller calcific densities in the right   adnexa with surrounding ascitic fluid. Endometrium thickened for a   postmenopausal patient, 1.5 cm AP.    BOWEL: No bowel obstruction. Appendix not identified, no evidence of   appendicitis.  PERITONEUM: Moderate volume ascites. Soft tissue densities within the   greater omentum (304:91) Mixed attenuating right paracolic densities with   associated retroperitoneal fat (304:83-93). Suspect additional nodularity   along the right posterior peritoneal lining (304:85), as well as similar   lesions in the pelvic, difficult to separate from bowel loops.  VESSELS: Mild atherosclerosis. No abdominal aortic aneurysm.  RETROPERITONEUM/LYMPH NODES: Mild bilateral external iliac   lymphadenopathy.  ABDOMINAL WALL: Small fat-containing umbilical hernia. Soft tissue edema   involving the left anterolateral abdominal wall without focal collection.  BONES: Degenerative changes. No lytic or blastic lesions. Left shoulder   total arthroplasty. Chronic healed fracture of the sternal body.    IMPRESSION:  Findings concerning for perforation of the fundal wall of the   gallbladder, communicating with a small amount of pericholecystic fluid,   and with a3.6 cm in greatest dimension complex cystic lesion in the   overlying hepatic parenchyma most likely an abscess.    The presence of gallstones and edematous wall thickening of the   gallbladder suggests that cholecystitis is the most likely cause forthis   perforation. Neoplasm less likely but possible.    Other smaller hepatic hypodensities may represent incidental cysts   although, given the probable carcinomatosis and right ovarian lesion   described below, metastases are also possible.    MRI abdomen with and without gadolinium and with MRCP would more   definitively image these gallbladder and hepatic abnormalities. Given the   patient's body habitus, ultrasound would probably not provide much more   information.    Several soft tissue density nodules in the omentum and peritoneal fat   most likely represents carcinomatosis. The most likely source is right   ovarian neoplasm; the right ovary is not identified separate from   numerous soft tissue and smaller calcific densities in theright adnexa   with surrounding ascitic fluid.    GYN protocol MRI pelvis with and without gadolinium would best evaluate   for right ovarian neoplasm.    Moderate volume simple density ascites.    No pulmonary embolism.    Small left and trace rightpleural effusions.            --- End of Report ---          BERHANE CAMP MD; Resident Radiologist  This document has been electronically signed.   EDITH WASHINGTON MD; Attending Radiologist  This document has been electronically signed. May  1 2024  2:03AM    < end of copied text >

## 2024-05-03 NOTE — PROGRESS NOTE ADULT - PROBLEM SELECTOR PLAN 4
Unclear etiology. Pt previously treated at [facility] for pneumonia. Also w/ hx of COPD not on any medications or home medications. Suspect AHRF 2/2 to COPD exacerbation vs ab pathology.     - wean oxygen as tolerated  - CPAP at night for SUZIE Unclear etiology. Pt previously treated at [facility] for pneumonia. Also w/ hx of COPD not on any medications or home medications. Suspect AHRF 2/2 to COPD exacerbation. Improving    - duonebs  - budesonide  - wean oxygen as tolerated  - CPAP at night for SUZIE

## 2024-05-03 NOTE — CHART NOTE - NSCHARTNOTEFT_GEN_A_CORE
Interventional Radiology    Pt seen at bedside today, states that she experiences 5-6/10 pain in RUQ when she cough and 9/10 on deep palpation.  WBC persists at 13, pt febrile to 100.5 at midnight.  Plan for perc dimple drain placement in IR today.    - please place IR procedure order under Jose M Leon MD  - STAT labs in AM (cbc,coags, bmp, T&S)  - pt not currently on any AC, last ASA taken on 4/25/24  - pt has been NPO since last night  - d/w primary team    Andrea Cade PA-C  IR call back ext. [phone]  Also available on Teams    - Non-emergent consults: Place IR consult order in [place]  - Emergent issues (pager): [facility] [phone]; [facility] [phone]; [phone]  - Scheduling questions: [facility] [phone]; [facility] [phone]  - Clinic/outpatient booking: [facility] [phone]; [facility] [phone]

## 2024-05-03 NOTE — PRE-ANESTHESIA EVALUATION ADULT - MALLAMPATI CLASS
Discharge Planning Assessment  [facility]     Patient Name: Dionne Chavez  MRN: [id number]  Today's Date: 10/18/2021    Admit Date: 10/15/2021     Discharge Needs Assessment     Row Name 10/18/21 0842       Living Environment    Lives With alone    Current Living Arrangements home/apartment/condo    Primary Care Provided by self; child(ángel)    Provides Primary Care For no one, unable/limited ability to care for self    Family Caregiver if Needed child(ángel), adult    Quality of Family Relationships supportive; helpful       Resource/Environmental Concerns    Resource/Environmental Concerns none       Transition Planning    Patient/Family Anticipates Transition to inpatient rehabilitation facility    Patient/Family Anticipated Services at Transition skilled nursing; rehabilitation services    Transportation Anticipated family or friend will provide       Discharge Needs Assessment    Readmission Within the Last 30 Days no previous admission in last 30 days    Current Outpatient/Agency/Support Group homecare agency    Equipment Currently Used at Home walker, rolling; wheelchair    Concerns to be Addressed discharge planning    Provided Post Acute Provider List? Yes    Post Acute Provider List Nursing Home    Provided Post Acute Provider Quality & Resource List? Yes    Post Acute Provider Quality and Resource List Nursing Home    Delivered To Patient    Method of Delivery In person               Discharge Plan     Row Name 10/18/21 0844       Plan    Plan Referrals to [facility].    Plan Comments S/W pt at bedside and (per pt request) her dtr Dariela by phone ([phone]).  Facesheet info confirmed.  Pt lives alone in a single story house - she was home for 1 week after a 22 month pvt pay stay at [facility].  Home DME includes a walker and w/c.  [facility] .  Pt has been to [facility] rehab in the past and would like to return.  Backup choice would be [facility] or return to  [facility].  Referrals called to Katie/ [facility], [facility] and N[facility]/ [facility].  Jose pending. ......sk              Continued Care and Services - Admitted Since 10/15/2021     Destination     Service Provider Request Status Selected Services Address Phone Fax Patient Preferred    [facility] [place]  Pending - Request Sent N/A [address] [id number] [phone] -- --    [facility]  Pending - Request Sent N/A [address] [facility] [address] [phone] --          Home Medical Care     Service Provider Request Status Selected Services Address Phone Fax Patient Preferred    [facility]  Pending - Request Sent N/A [address] [facility] [id number] [address] [phone] --                 Demographic Summary     Row Name 10/18/21 0840       General Information    Admission Type inpatient    Arrived From home    Referral Source admission list    Reason for Consult discharge planning    Preferred Language English               Functional Status     Row Name 10/18/21 0840       Functional Status    Usual Activity Tolerance moderate    Current Activity Tolerance fair       Functional Status, IADL    Medications assistive person    Meal Preparation assistive person    Housekeeping assistive person    Laundry assistive person    Shopping assistive person       Mental Status    General Appearance WDL WDL       Employment/    Employment Status retired                         Lindsay Duran RN     Class II - visualization of the soft palate, fauces, and uvula

## 2024-05-03 NOTE — PROGRESS NOTE ADULT - PROBLEM SELECTOR PLAN 2
CT findings c/f cholelithiasis with perforation and possible abscess. HIDA scan negative. MRCP shows   Choledocholithiasis resulting in biliary ductal dilatation. Also w/ cholelithiasis with discontinuity of the inflamed gallbladder wall contiguous with a 4.3 cm multiloculated collection that is most compatible with perforated cholecystitis with intrahepatic abscess.     - gen surg consulted -> recommend further MRI abd/pelvis for eval. not a surgical candidate at this time   - f/u IR recs   -ABx coverage as above

## 2024-05-03 NOTE — CONSULT NOTE ADULT - ASSESSMENT
A/P: 82yo F, P2 LMP 30y ago, with PMH of COPD not on O2, morbid obesity, HTN, CHF, Basal Cell Carcinoma, hypothyroidism, psoriatic arthritis, SUZIE, admitted with perforated gallbladder on Zosyn (5/1-) with plan for percutaneous cholecystostomy tube placement today (5/3). Gyn consulted for incidental imaging findings concerning for malignancy, MR Pelvis (5/1): 5.3 cm left adnexal mass with multiple omental and peritoneal deposits throughout the abdomen and pelvis. At the time of evaluation, patient is clinically and hemodynamically stable.  - Recommendations to be finalized after d/w attending GYN   GAYLE Collier, PGY3  A/P: 84yo F, P2 LMP 30y ago, with PMH of COPD not on O2, morbid obesity, HTN, CHF, Basal Cell Carcinoma, hypothyroidism, psoriatic arthritis, SUZIE, admitted with perforated gallbladder on Zosyn (5/1-) with plan for percutaneous cholecystostomy tube placement today (5/3). Gyn consulted for incidental imaging findings concerning for malignancy, MR Pelvis (5/1): 5.3 cm left adnexal mass with multiple omental and peritoneal deposits throughout the abdomen and pelvis. At the time of evaluation, patient is clinically and hemodynamically stable.  - Recommend IR biopsy for pathological diagnosis of malignancy   - Will reach out to IR for possible coordination with perc dimple tube placement today  - Recommendations to be discussed with patient and son (HCP Zhao Whittaker)    D/w Dr. Christensen (Gyn Service Attending)   GAYLE Collier, PGY3  A/P: 84yo F, P2 LMP 30y ago, with PMH of COPD not on O2, morbid obesity, HTN, CHF, Basal Cell Carcinoma, hypothyroidism, psoriatic arthritis, SUZIE, admitted with perforated gallbladder on Zosyn (5/1-) with plan for percutaneous cholecystostomy tube placement today (5/3). Gyn consulted for incidental imaging findings concerning for malignancy, MR Pelvis (5/1): 5.3 cm left adnexal mass with multiple omental and peritoneal deposits throughout the abdomen and pelvis. At the time of evaluation, patient is clinically and hemodynamically stable.  - Recommend IR biopsy for pathological diagnosis of malignancy   - Will reach out to IR for possible coordination with perc dimple tube placement today  - Recommendations to be discussed with patient and son (HCP Zhao Whittaker)    D/w Dr. Christensen (Gyn Service Attending)   GAYLE Collier, PGY3     Addendum  Case was discussed with IR who recommended outpatient biopsy. This was discussed with patient's son and HCP Zhao Whittaker. Phone number for IR scheduling provided.     MIS Collier PGY3

## 2024-05-03 NOTE — PROVIDER CONTACT NOTE (CHANGE IN STATUS NOTIFICATION) - ACTION/TREATMENT ORDERED:
MD made aware, blood cultures X2 sets done, medicated with Tylenol 650 mg PO X1 as ordered, will closely monitor temp.

## 2024-05-03 NOTE — PROGRESS NOTE ADULT - ASSESSMENT
83 female history of COPD not on O2, obesity, HTN, CHF, BCC, hypothyroidism, psoriatic arthritis SUZIE BIBEMS for acute onset SOB, admitted for sepsis secondary to perforated gallbladder. Also found to have complex cystic liver lesion and right ovarian neoplasm w/ carcinomatosis. Hospital course complicated w/ AHRF, unclear etiology but likely related to ongoing abdominal pathology vs COPD exacerbation.  83 female history of COPD not on O2, obesity, HTN, CHF, BCC, hypothyroidism, psoriatic arthritis SUZIE BIBEMS for acute onset SOB, admitted for sepsis secondary to perforated gallbladder. Also found to have complex cystic liver lesion and new left ovarian neoplasm w/ carcinomatosis. Hospital course complicated w/ AHRF likely 2/2 to COPD exacerbation.

## 2024-05-03 NOTE — PROGRESS NOTE ADULT - PROBLEM SELECTOR PLAN 3
CT A/P suggestive of ovarian malignancy w/ carcinomatosis. 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     -f/u tumor markers  -GYN onc consulted, appreciate recs CT A/P suggestive of ovarian malignancy w/ carcinomatosis. 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   Elevated tumor marker: CEA 4.2, Ca 19-9 110, CA-125 801.     - f/u gyn onc recs   - IR consult for tissue biopsy

## 2024-05-03 NOTE — PRE-ANESTHESIA EVALUATION ADULT - NSANTHPMHFT_GEN_ALL_CORE
3y F postmenopausal patient admitted from nursing home for increased work of breathing while on Tx for pneumonia, found to have perforated gallbladder
Sepsis  Perforated gallbladder  Metastatic ovarian cancer  COPD not on O2  CHF

## 2024-05-03 NOTE — PROGRESS NOTE ADULT - SUBJECTIVE AND OBJECTIVE BOX
*******************************  Alejandra Mota MD (PGY-1)  Internal Medicine  Contact via [employer] TEAMS  *******************************    JUSTIN KEYES  83y  Female    Patient is a 83y old  Female who presents with a chief complaint of Sepsis     (02 May 2024 13:30)      Subjective:    Objective:  T(C): 36.6 (05-03-24 @ 04:16), Max: 38.1 (05-03-24 @ 00:09)  HR: 85 (05-03-24 @ 04:16) (83 - 98)  BP: 135/61 (05-03-24 @ 04:16) (128/61 - 142/83)  RR: 19 (05-03-24 @ 04:16) (18 - 20)  SpO2: 97% (05-03-24 @ 04:16) (96% - 98%)  I&O's Summary    02 May 2024 07:01  -  03 May 2024 07:00  --------------------------------------------------------  IN: 200 mL / OUT: 1150 mL / NET: -950 mL        PHYSICAL EXAM:  GENERAL: NAD  HEAD:  Atraumatic, Normocephalic  EYES: EOMI, PERRLA, conjunctiva and sclera clear  ENMT: Moist mucous membranes  NECK: Supple, No JVD, trachea midline   NERVOUS SYSTEM:  Alert & Oriented X3, Good concentration; Motor Strength 5/5 B/L upper and lower extremities; DTRs 2+ intact and symmetric  CHEST/LUNG: Clear to auscultation bilaterally; No rales, rhonchi, wheezing, or rubs  HEART: Regular rate and rhythm; No murmurs, rubs, or gallops  ABDOMEN: Soft, Nontender, Nondistended; Bowel sounds present  EXTREMITIES:  2+ Peripheral Pulses, No clubbing, cyanosis, or edema  LYMPH: No lymphadenopathy noted  SKIN: No rashes or lesions    MEDICATIONS  (STANDING):  albuterol/ipratropium for Nebulization 3 milliLiter(s) Nebulizer every 6 hours  buDESOnide    Inhalation Suspension 0.25 milliGRAM(s) Inhalation two times a day  chlorhexidine 4% Liquid 1 Application(s) Topical daily  gabapentin 800 milliGRAM(s) Oral every 12 hours  levothyroxine 50 MICROGram(s) Oral daily  loratadine 10 milliGRAM(s) Oral daily  piperacillin/tazobactam IVPB.. 3.375 Gram(s) IV Intermittent every 8 hours  polyethylene glycol 3350 17 Gram(s) Oral daily  senna 2 Tablet(s) Oral at bedtime    MEDICATIONS  (PRN):  acetaminophen     Tablet .. 650 milliGRAM(s) Oral every 6 hours PRN Temp greater or equal to 38C (100.4F), Mild Pain (1 - 3)  oxyCODONE    IR 5 milliGRAM(s) Oral every 4 hours PRN Moderate Pain (4 - 6)      LABS:        CAPILLARY BLOOD GLUCOSE          RADIOLOGY & ADDITIONAL TESTS:               *******************************  Alejandra Mota MD (PGY-1)  Internal Medicine  Contact via [employer] TEAMS  *******************************    JUSTIN KEYES  83y  Female    Patient is a 83y old  Female who presents with a chief complaint of Sepsis(02 May 2024 13:30)    Subjective: Overnight, patient w/ T 100.5, improved w/ tylenol. Seen at bedside this morning. Noted SOB improved. Still endorsed ab pain w/ cough. Denied any chest pain, n/v.     Objective:  T(C): 36.6 (05-03-24 @ 04:16), Max: 38.1 (05-03-24 @ 00:09)  HR: 85 (05-03-24 @ 04:16) (83 - 98)  BP: 135/61 (05-03-24 @ 04:16) (128/61 - 142/83)  RR: 19 (05-03-24 @ 04:16) (18 - 20)  SpO2: 97% (05-03-24 @ 04:16) (96% - 98%)  I&O's Summary    02 May 2024 07:01  -  03 May 2024 07:00  --------------------------------------------------------  IN: 200 mL / OUT: 1150 mL / NET: -950 mL    PHYSICAL EXAM:  GENERAL: NAD, obese   HEAD:  Atraumatic, Normocephalic  EYES: EOMI, PERRLA, conjunctiva and sclera clear  ENMT: Moist mucous membranes  NECK: Supple, No JVD, trachea midline   NERVOUS SYSTEM:  Alert & Oriented X3, Good concentration  CHEST/LUNG: Clear to auscultation bilaterally; No rales, rhonchi, wheezing, or rubs  HEART: Regular rate and rhythm; No murmurs, rubs, or gallops  ABDOMEN: Soft, Nontender, Nondistended; Bowel sounds present  EXTREMITIES:  2+ Peripheral Pulses, No clubbing, cyanosis, or edema  SKIN: No rashes or lesions    MEDICATIONS  (STANDING):  albuterol/ipratropium for Nebulization 3 milliLiter(s) Nebulizer every 6 hours  buDESOnide    Inhalation Suspension 0.25 milliGRAM(s) Inhalation two times a day  chlorhexidine 4% Liquid 1 Application(s) Topical daily  gabapentin 800 milliGRAM(s) Oral every 12 hours  levothyroxine 50 MICROGram(s) Oral daily  loratadine 10 milliGRAM(s) Oral daily  piperacillin/tazobactam IVPB.. 3.375 Gram(s) IV Intermittent every 8 hours  polyethylene glycol 3350 17 Gram(s) Oral daily  senna 2 Tablet(s) Oral at bedtime    MEDICATIONS  (PRN):  acetaminophen     Tablet .. 650 milliGRAM(s) Oral every 6 hours PRN Temp greater or equal to 38C (100.4F), Mild Pain (1 - 3)  oxyCODONE    IR 5 milliGRAM(s) Oral every 4 hours PRN Moderate Pain (4 - 6)    LABS:                   10.0   13.25 )-----------( 131      ( 03 May 2024 04:21 )             31.7     05-03    139  |  101  |  16  ----------------------------<  113<H>  3.5   |  26  |  0.55    Ca    8.8      03 May 2024 04:21  Phos  3.8     05-03  Mg     2.1     05-03    TPro  6.3  /  Alb  3.1<L>  /  TBili  0.7  /  DBili  x   /  AST  25  /  ALT  33  /  AlkPhos  109  05-03    PT/INR - ( 03 May 2024 04:21 )   PT: 13.3 sec;   INR: 1.28 ratio    PTT - ( 03 May 2024 04:21 )  PTT:23.1 sec    Culture Results:   <10,000 CFU/mL Normal Urogenital Ijeoma (04-30 @ 23:24)  Culture Results:   No growth at 48 Hours (04-30 @ 21:17)  Culture Results:   No growth at 48 Hours (04-30 @ 21:00)    CAPILLARY BLOOD GLUCOSE    RADIOLOGY & ADDITIONAL TESTS:  < from: MR MRCP w/wo IV Cont (05.01.24 @ 22:29) >  A 5.3 cm LEFT ovarian/adnexal mass with multiple omental and peritoneal   deposits throughout the abdomen and pelvis, highly suspicious for primary   gynecologic malignancy with peritoneal and omental carcinomatosis.   Recommend gynecologic/oncologic consult and consider tissue sampling for   confirmation.    Cholelithiasis with discontinuity of the inflamed gallbladder wall   contiguous with a 4.3 cm multiloculated collection in the adjacent liver   in segment 5, most compatible with perforated cholecystitis with   intrahepatic abscess. Given additional MR findings highly suspicious for   malignancy, an underlying malignant/metastatic liver lesion cannot be   entirely excluded. Multiple serosal implants/tumor deposits along the   surface of the liver.    Choledocholithiasis resulting in biliary ductal dilatation.    Endometrium is distended to 1.4 cm with nonenhancing T1 hyperintense   material, favoring hemorrhagic debris versus less likely underlying   lesion.    < end of copied text >

## 2024-05-04 LAB
ALBUMIN SERPL ELPH-MCNC: 2.9 G/DL — LOW (ref 3.3–5)
ALP SERPL-CCNC: 102 U/L — SIGNIFICANT CHANGE UP (ref 40–120)
ALT FLD-CCNC: 24 U/L — SIGNIFICANT CHANGE UP (ref 10–45)
ANION GAP SERPL CALC-SCNC: 14 MMOL/L — SIGNIFICANT CHANGE UP (ref 5–17)
AST SERPL-CCNC: 18 U/L — SIGNIFICANT CHANGE UP (ref 10–40)
BILIRUB SERPL-MCNC: 0.6 MG/DL — SIGNIFICANT CHANGE UP (ref 0.2–1.2)
BUN SERPL-MCNC: 11 MG/DL — SIGNIFICANT CHANGE UP (ref 7–23)
CALCIUM SERPL-MCNC: 8.8 MG/DL — SIGNIFICANT CHANGE UP (ref 8.4–10.5)
CHLORIDE SERPL-SCNC: 100 MMOL/L — SIGNIFICANT CHANGE UP (ref 96–108)
CO2 SERPL-SCNC: 24 MMOL/L — SIGNIFICANT CHANGE UP (ref 22–31)
CREAT SERPL-MCNC: 0.44 MG/DL — LOW (ref 0.5–1.3)
EGFR: 97 ML/MIN/1.73M2 — SIGNIFICANT CHANGE UP
GLUCOSE SERPL-MCNC: 128 MG/DL — HIGH (ref 70–99)
GRAM STN FLD: ABNORMAL
HCT VFR BLD CALC: 31.8 % — LOW (ref 34.5–45)
HGB BLD-MCNC: 10 G/DL — LOW (ref 11.5–15.5)
MAGNESIUM SERPL-MCNC: 2 MG/DL — SIGNIFICANT CHANGE UP (ref 1.6–2.6)
MCHC RBC-ENTMCNC: 28.6 PG — SIGNIFICANT CHANGE UP (ref 27–34)
MCHC RBC-ENTMCNC: 31.4 GM/DL — LOW (ref 32–36)
MCV RBC AUTO: 90.9 FL — SIGNIFICANT CHANGE UP (ref 80–100)
NRBC # BLD: 0 /100 WBCS — SIGNIFICANT CHANGE UP (ref 0–0)
PHOSPHATE SERPL-MCNC: 2.5 MG/DL — SIGNIFICANT CHANGE UP (ref 2.5–4.5)
PLATELET # BLD AUTO: 194 K/UL — SIGNIFICANT CHANGE UP (ref 150–400)
POTASSIUM SERPL-MCNC: 4 MMOL/L — SIGNIFICANT CHANGE UP (ref 3.5–5.3)
POTASSIUM SERPL-SCNC: 4 MMOL/L — SIGNIFICANT CHANGE UP (ref 3.5–5.3)
PROT SERPL-MCNC: 6.3 G/DL — SIGNIFICANT CHANGE UP (ref 6–8.3)
RBC # BLD: 3.5 M/UL — LOW (ref 3.8–5.2)
RBC # FLD: 15.3 % — HIGH (ref 10.3–14.5)
SODIUM SERPL-SCNC: 138 MMOL/L — SIGNIFICANT CHANGE UP (ref 135–145)
SPECIMEN SOURCE: SIGNIFICANT CHANGE UP
WBC # BLD: 11.8 K/UL — HIGH (ref 3.8–10.5)
WBC # FLD AUTO: 11.8 K/UL — HIGH (ref 3.8–10.5)

## 2024-05-04 PROCEDURE — 99233 SBSQ HOSP IP/OBS HIGH 50: CPT | Mod: GC

## 2024-05-04 RX ORDER — AZITHROMYCIN 500 MG/1
500 TABLET, FILM COATED ORAL EVERY 24 HOURS
Refills: 0 | Status: DISCONTINUED | OUTPATIENT
Start: 2024-05-04 | End: 2024-05-06

## 2024-05-04 RX ORDER — NYSTATIN CREAM 100000 [USP'U]/G
1 CREAM TOPICAL THREE TIMES A DAY
Refills: 0 | Status: DISCONTINUED | OUTPATIENT
Start: 2024-05-04 | End: 2024-05-10

## 2024-05-04 RX ADMIN — Medication 50 MICROGRAM(S): at 05:48

## 2024-05-04 RX ADMIN — Medication 3 MILLILITER(S): at 23:13

## 2024-05-04 RX ADMIN — MUPIROCIN 1 APPLICATION(S): 20 OINTMENT TOPICAL at 17:09

## 2024-05-04 RX ADMIN — PIPERACILLIN AND TAZOBACTAM 25 GRAM(S): 4; .5 INJECTION, POWDER, LYOPHILIZED, FOR SOLUTION INTRAVENOUS at 14:26

## 2024-05-04 RX ADMIN — LORATADINE 10 MILLIGRAM(S): 10 TABLET ORAL at 11:24

## 2024-05-04 RX ADMIN — GABAPENTIN 800 MILLIGRAM(S): 400 CAPSULE ORAL at 05:48

## 2024-05-04 RX ADMIN — MUPIROCIN 1 APPLICATION(S): 20 OINTMENT TOPICAL at 05:48

## 2024-05-04 RX ADMIN — PIPERACILLIN AND TAZOBACTAM 25 GRAM(S): 4; .5 INJECTION, POWDER, LYOPHILIZED, FOR SOLUTION INTRAVENOUS at 23:03

## 2024-05-04 RX ADMIN — GABAPENTIN 800 MILLIGRAM(S): 400 CAPSULE ORAL at 17:09

## 2024-05-04 RX ADMIN — NYSTATIN CREAM 1 APPLICATION(S): 100000 CREAM TOPICAL at 14:26

## 2024-05-04 RX ADMIN — Medication 3 MILLILITER(S): at 17:09

## 2024-05-04 RX ADMIN — NYSTATIN CREAM 1 APPLICATION(S): 100000 CREAM TOPICAL at 23:04

## 2024-05-04 RX ADMIN — Medication 0.25 MILLIGRAM(S): at 06:14

## 2024-05-04 RX ADMIN — SENNA PLUS 2 TABLET(S): 8.6 TABLET ORAL at 23:03

## 2024-05-04 RX ADMIN — POLYETHYLENE GLYCOL 3350 17 GRAM(S): 17 POWDER, FOR SOLUTION ORAL at 11:24

## 2024-05-04 RX ADMIN — Medication 3 MILLILITER(S): at 11:24

## 2024-05-04 RX ADMIN — Medication 3 MILLILITER(S): at 01:16

## 2024-05-04 RX ADMIN — Medication 3 MILLILITER(S): at 05:48

## 2024-05-04 RX ADMIN — PIPERACILLIN AND TAZOBACTAM 25 GRAM(S): 4; .5 INJECTION, POWDER, LYOPHILIZED, FOR SOLUTION INTRAVENOUS at 05:48

## 2024-05-04 RX ADMIN — Medication 0.25 MILLIGRAM(S): at 17:10

## 2024-05-04 NOTE — PROGRESS NOTE ADULT - ASSESSMENT
83 female history of COPD not on O2, obesity, HTN, CHF, BCC, hypothyroidism, psoriatic arthritis SUZIE BIBEMS for acute onset SOB, admitted for sepsis secondary to perforated gallbladder. Also found to have complex cystic liver lesion and new left ovarian neoplasm w/ carcinomatosis. Hospital course complicated w/ AHRF likely 2/2 to COPD exacerbation.

## 2024-05-04 NOTE — PROGRESS NOTE ADULT - PROBLEM SELECTOR PLAN 3
CT A/P suggestive of ovarian malignancy w/ carcinomatosis. 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   Elevated tumor marker: CEA 4.2, Ca 19-9 110, CA-125 801.     - f/u gyn onc recs   - IR consult for tissue biopsy

## 2024-05-04 NOTE — PROGRESS NOTE ADULT - PROBLEM SELECTOR PLAN 7
DVT PPx: Lovenox 40 mg qd  Diet: NPO for perc dimple  Bowel Regimen: Miralax/Senna  Code: DNR/DNI w/ trial of intubation   Dispo: Pending Hospital Course

## 2024-05-04 NOTE — PROGRESS NOTE ADULT - PROBLEM SELECTOR PLAN 4
Unclear etiology. Pt previously treated at [facility] for pneumonia. Also w/ hx of COPD not on any medications or home medications. Suspect AHRF 2/2 to COPD exacerbation. Improving    - duonebs  - budesonide  - wean oxygen as tolerated  - CPAP at night for SUZIE

## 2024-05-04 NOTE — PROGRESS NOTE ADULT - SUBJECTIVE AND OBJECTIVE BOX
Jean-Claude Fischer | PGY2| [employer] TEAMS ONLY  Interval Events: No acute events overnight. Pt seen and examined at bedside.  Patient denies any complaints overnight. The patient denies any fevers, chills, nausea, vomiting, or increased pain.  Continues to endorse wheezing      REVIEW OF SYSTEMS:  CONSTITUTIONAL: No weakness, fevers or chills  EYES/ENT: No visual changes;  No vertigo or throat pain   NECK: No pain or stiffness  RESPIRATORY: No cough, wheezing, hemoptysis; No shortness of breath  CARDIOVASCULAR: No chest pain or palpitations  GASTROINTESTINAL: No abdominal or epigastric pain. No nausea, vomiting, or hematemesis; No diarrhea or constipation. No melena or hematochezia.  GENITOURINARY: No dysuria, frequency or hematuria  NEUROLOGICAL: No numbness or weakness  SKIN: No itching, burning, rashes, or lesions   All other review of systems is negative unless indicated above.    OBJECTIVE:  ICU Vital Signs Last 24 Hrs  T(C): 36.9 (04 May 2024 04:05), Max: 37.2 (03 May 2024 21:11)  T(F): 98.4 (04 May 2024 04:05), Max: 98.9 (03 May 2024 21:11)  HR: 80 (04 May 2024 09:15) (76 - 97)  BP: 128/66 (04 May 2024 04:05) (106/49 - 166/74)  BP(mean): 65 (03 May 2024 15:10) (64 - 76)  ABP: --  ABP(mean): --  RR: 20 (04 May 2024 09:15) (15 - 20)  SpO2: 98% (04 May 2024 09:15) (96% - 100%)    O2 Parameters below as of 04 May 2024 09:15  Patient On (Oxygen Delivery Method): nasal cannula  O2 Flow (L/min): 2            05-03 @ 07:01  -  05-04 @ 07:00  --------------------------------------------------------  IN: 200 mL / OUT: 710 mL / NET: -510 mL    05-04 @ 07:01  -  05-04 @ 09:51  --------------------------------------------------------  IN: 240 mL / OUT: 0 mL / NET: 240 mL      CAPILLARY BLOOD GLUCOSE          PHYSICAL EXAM:  General: WN/WD NAD  Neurology: A&Ox3, nonfocal, LOVE x 4  Eyes: PERRLA/ EOMI, Gross vision intact  ENT/Neck: Neck supple, trachea midline, No JVD, Gross hearing intact  Respiratory: CTA B/L, No wheezing, rales, rhonchi  CV: RRR, +S1/S2, -S3/S4, no murmurs, rubs or gallops  Abdominal: Soft, NT, ND +BS, No HSM  MSK: 5/5 strength UE/LE bilaterally  Extremities: No edema, 2+ peripheral pulses  Skin: No Rashes, Hematoma, Ecchymosis  Incisions:   Tubes:    HOSPITAL MEDICATIONS:  MEDICATIONS  (STANDING):  albuterol/ipratropium for Nebulization 3 milliLiter(s) Nebulizer every 6 hours  buDESOnide    Inhalation Suspension 0.25 milliGRAM(s) Inhalation two times a day  chlorhexidine 4% Liquid 1 Application(s) Topical daily  gabapentin 800 milliGRAM(s) Oral every 12 hours  levothyroxine 50 MICROGram(s) Oral daily  loratadine 10 milliGRAM(s) Oral daily  mupirocin 2% Nasal 1 Application(s) Both Nostrils two times a day  piperacillin/tazobactam IVPB.. 3.375 Gram(s) IV Intermittent every 8 hours  polyethylene glycol 3350 17 Gram(s) Oral daily  senna 2 Tablet(s) Oral at bedtime    MEDICATIONS  (PRN):  acetaminophen     Tablet .. 650 milliGRAM(s) Oral every 6 hours PRN Temp greater or equal to 38C (100.4F), Mild Pain (1 - 3)  oxyCODONE    IR 5 milliGRAM(s) Oral every 4 hours PRN Moderate Pain (4 - 6)      LABS:                        10.0   11.80 )-----------( 194      ( 04 May 2024 06:51 )             31.8     Hgb Trend: 10.0<--, 10.0<--, 10.6<--, 9.9<--  05-04    138  |  100  |  11  ----------------------------<  128<H>  4.0   |  24  |  0.44<L>    Ca    8.8      04 May 2024 06:51  Phos  2.5     05-04  Mg     2.0     05-04    TPro  6.3  /  Alb  2.9<L>  /  TBili  0.6  /  DBili  x   /  AST  18  /  ALT  24  /  AlkPhos  102  05-04    Creatinine Trend: 0.44<--, 0.55<--, 0.58<--, 0.77<--  PT/INR - ( 03 May 2024 04:21 )   PT: 13.3 sec;   INR: 1.28 ratio         PTT - ( 03 May 2024 04:21 )  PTT:23.1 sec  Urinalysis Basic - ( 04 May 2024 06:51 )    Color: x / Appearance: x / SG: x / pH: x  Gluc: 128 mg/dL / Ketone: x  / Bili: x / Urobili: x   Blood: x / Protein: x / Nitrite: x   Leuk Esterase: x / RBC: x / WBC x   Sq Epi: x / Non Sq Epi: x / Bacteria: x            MICROBIOLOGY:     Culture - Body Fluid with Gram Stain (collected 03 May 2024 17:11)  Source: Bile Bile Fluid  Gram Stain (04 May 2024 06:18):    polymorphonuclear leukocytes seen    Gram Negative Rods seen    by cytocentrifuge    Culture - Blood (collected 03 May 2024 01:08)  Source: .Blood Blood-Peripheral  Preliminary Report (04 May 2024 05:01):    No growth at 24 hours    Culture - Blood (collected 03 May 2024 01:08)  Source: .Blood Blood-Peripheral  Preliminary Report (04 May 2024 05:01):    No growth at 24 hours

## 2024-05-04 NOTE — PROGRESS NOTE ADULT - PROBLEM SELECTOR PLAN 1
Met sepsis criteria w/ leukocytosis, tachypnea, fever and tachycardia. Likely secondary to perforated gallbladder seen on CT scan. MRCP findings suggestive of perforated cholecystitis with intrahepatic abscess. U/A, RVP negative. CTA neg for PE. Blood and urine cultures negative.     - continue w/ zosyn   - scheduled for perc dimple w/ drain today   - trend WBC and fever curve

## 2024-05-05 LAB
-  AMOXICILLIN/CLAVULANIC ACID: SIGNIFICANT CHANGE UP
-  AMPICILLIN/SULBACTAM: SIGNIFICANT CHANGE UP
-  AMPICILLIN: SIGNIFICANT CHANGE UP
-  AMPICILLIN: SIGNIFICANT CHANGE UP
-  AZTREONAM: SIGNIFICANT CHANGE UP
-  CEFAZOLIN: SIGNIFICANT CHANGE UP
-  CEFEPIME: SIGNIFICANT CHANGE UP
-  CEFTRIAXONE: SIGNIFICANT CHANGE UP
-  CIPROFLOXACIN: SIGNIFICANT CHANGE UP
-  ERTAPENEM: SIGNIFICANT CHANGE UP
-  GENTAMICIN: SIGNIFICANT CHANGE UP
-  IMIPENEM: SIGNIFICANT CHANGE UP
-  LEVOFLOXACIN: SIGNIFICANT CHANGE UP
-  MEROPENEM: SIGNIFICANT CHANGE UP
-  PIPERACILLIN/TAZOBACTAM: SIGNIFICANT CHANGE UP
-  TOBRAMYCIN: SIGNIFICANT CHANGE UP
-  TRIMETHOPRIM/SULFAMETHOXAZOLE: SIGNIFICANT CHANGE UP
-  VANCOMYCIN: SIGNIFICANT CHANGE UP
ALBUMIN SERPL ELPH-MCNC: 2.6 G/DL — LOW (ref 3.3–5)
ALP SERPL-CCNC: 85 U/L — SIGNIFICANT CHANGE UP (ref 40–120)
ALT FLD-CCNC: 15 U/L — SIGNIFICANT CHANGE UP (ref 10–45)
ANION GAP SERPL CALC-SCNC: 11 MMOL/L — SIGNIFICANT CHANGE UP (ref 5–17)
AST SERPL-CCNC: 15 U/L — SIGNIFICANT CHANGE UP (ref 10–40)
BILIRUB SERPL-MCNC: 0.5 MG/DL — SIGNIFICANT CHANGE UP (ref 0.2–1.2)
BUN SERPL-MCNC: 10 MG/DL — SIGNIFICANT CHANGE UP (ref 7–23)
CALCIUM SERPL-MCNC: 8.5 MG/DL — SIGNIFICANT CHANGE UP (ref 8.4–10.5)
CHLORIDE SERPL-SCNC: 99 MMOL/L — SIGNIFICANT CHANGE UP (ref 96–108)
CO2 SERPL-SCNC: 26 MMOL/L — SIGNIFICANT CHANGE UP (ref 22–31)
CREAT SERPL-MCNC: 0.52 MG/DL — SIGNIFICANT CHANGE UP (ref 0.5–1.3)
EGFR: 93 ML/MIN/1.73M2 — SIGNIFICANT CHANGE UP
GLUCOSE SERPL-MCNC: 159 MG/DL — HIGH (ref 70–99)
HCT VFR BLD CALC: 29 % — LOW (ref 34.5–45)
HGB BLD-MCNC: 9.3 G/DL — LOW (ref 11.5–15.5)
MAGNESIUM SERPL-MCNC: 1.8 MG/DL — SIGNIFICANT CHANGE UP (ref 1.6–2.6)
MCHC RBC-ENTMCNC: 28.9 PG — SIGNIFICANT CHANGE UP (ref 27–34)
MCHC RBC-ENTMCNC: 32.1 GM/DL — SIGNIFICANT CHANGE UP (ref 32–36)
MCV RBC AUTO: 90.1 FL — SIGNIFICANT CHANGE UP (ref 80–100)
METHOD TYPE: SIGNIFICANT CHANGE UP
METHOD TYPE: SIGNIFICANT CHANGE UP
NRBC # BLD: 0 /100 WBCS — SIGNIFICANT CHANGE UP (ref 0–0)
PHOSPHATE SERPL-MCNC: 2.9 MG/DL — SIGNIFICANT CHANGE UP (ref 2.5–4.5)
PLATELET # BLD AUTO: 226 K/UL — SIGNIFICANT CHANGE UP (ref 150–400)
POTASSIUM SERPL-MCNC: 3.9 MMOL/L — SIGNIFICANT CHANGE UP (ref 3.5–5.3)
POTASSIUM SERPL-SCNC: 3.9 MMOL/L — SIGNIFICANT CHANGE UP (ref 3.5–5.3)
PROT SERPL-MCNC: 5.9 G/DL — LOW (ref 6–8.3)
RBC # BLD: 3.22 M/UL — LOW (ref 3.8–5.2)
RBC # FLD: 15.3 % — HIGH (ref 10.3–14.5)
SODIUM SERPL-SCNC: 136 MMOL/L — SIGNIFICANT CHANGE UP (ref 135–145)
WBC # BLD: 10.45 K/UL — SIGNIFICANT CHANGE UP (ref 3.8–10.5)
WBC # FLD AUTO: 10.45 K/UL — SIGNIFICANT CHANGE UP (ref 3.8–10.5)

## 2024-05-05 PROCEDURE — 99232 SBSQ HOSP IP/OBS MODERATE 35: CPT | Mod: GC

## 2024-05-05 RX ORDER — MEROPENEM 1 G/30ML
1000 INJECTION INTRAVENOUS EVERY 8 HOURS
Refills: 0 | Status: DISCONTINUED | OUTPATIENT
Start: 2024-05-05 | End: 2024-05-06

## 2024-05-05 RX ADMIN — Medication 3 MILLILITER(S): at 11:31

## 2024-05-05 RX ADMIN — Medication 0.25 MILLIGRAM(S): at 17:16

## 2024-05-05 RX ADMIN — Medication 3 MILLILITER(S): at 17:16

## 2024-05-05 RX ADMIN — Medication 50 MICROGRAM(S): at 06:05

## 2024-05-05 RX ADMIN — PIPERACILLIN AND TAZOBACTAM 25 GRAM(S): 4; .5 INJECTION, POWDER, LYOPHILIZED, FOR SOLUTION INTRAVENOUS at 06:06

## 2024-05-05 RX ADMIN — Medication 3 MILLILITER(S): at 06:05

## 2024-05-05 RX ADMIN — LORATADINE 10 MILLIGRAM(S): 10 TABLET ORAL at 11:32

## 2024-05-05 RX ADMIN — Medication 40 MILLIGRAM(S): at 06:05

## 2024-05-05 RX ADMIN — MUPIROCIN 1 APPLICATION(S): 20 OINTMENT TOPICAL at 17:16

## 2024-05-05 RX ADMIN — NYSTATIN CREAM 1 APPLICATION(S): 100000 CREAM TOPICAL at 06:07

## 2024-05-05 RX ADMIN — MEROPENEM 100 MILLIGRAM(S): 1 INJECTION INTRAVENOUS at 21:04

## 2024-05-05 RX ADMIN — GABAPENTIN 800 MILLIGRAM(S): 400 CAPSULE ORAL at 06:05

## 2024-05-05 RX ADMIN — NYSTATIN CREAM 1 APPLICATION(S): 100000 CREAM TOPICAL at 15:01

## 2024-05-05 RX ADMIN — PIPERACILLIN AND TAZOBACTAM 25 GRAM(S): 4; .5 INJECTION, POWDER, LYOPHILIZED, FOR SOLUTION INTRAVENOUS at 13:28

## 2024-05-05 RX ADMIN — Medication 3 MILLILITER(S): at 23:19

## 2024-05-05 RX ADMIN — AZITHROMYCIN 255 MILLIGRAM(S): 500 TABLET, FILM COATED ORAL at 04:32

## 2024-05-05 RX ADMIN — CHLORHEXIDINE GLUCONATE 1 APPLICATION(S): 213 SOLUTION TOPICAL at 11:31

## 2024-05-05 RX ADMIN — POLYETHYLENE GLYCOL 3350 17 GRAM(S): 17 POWDER, FOR SOLUTION ORAL at 11:31

## 2024-05-05 RX ADMIN — Medication 0.25 MILLIGRAM(S): at 06:55

## 2024-05-05 RX ADMIN — SENNA PLUS 2 TABLET(S): 8.6 TABLET ORAL at 22:26

## 2024-05-05 RX ADMIN — GABAPENTIN 800 MILLIGRAM(S): 400 CAPSULE ORAL at 17:16

## 2024-05-05 RX ADMIN — MUPIROCIN 1 APPLICATION(S): 20 OINTMENT TOPICAL at 06:07

## 2024-05-05 RX ADMIN — NYSTATIN CREAM 1 APPLICATION(S): 100000 CREAM TOPICAL at 22:27

## 2024-05-05 NOTE — PROGRESS NOTE ADULT - PROBLEM SELECTOR PLAN 7
DVT PPx: Lovenox 40 mg qd  Diet: NPO for perc dimple  Bowel Regimen: Miralax/Senna  Code: DNR/DNI w/ trial of intubation   Dispo: Pending Hospital Course DVT PPx: Lovenox 40 mg qd  Diet: regular  Bowel Regimen: Miralax/Senna  Code: DNR/DNI w/ trial of intubation   Dispo: Pending Hospital Course

## 2024-05-05 NOTE — PROGRESS NOTE ADULT - PROBLEM SELECTOR PLAN 4
Unclear etiology. Pt previously treated at [facility] for pneumonia. Also w/ hx of COPD not on any medications or home medications. Suspect AHRF 2/2 to COPD exacerbation. Improving    - duonebs  - budesonide  - wean oxygen as tolerated  - CPAP at night for SUZIE Met sepsis criteria w/ leukocytosis, tachypnea, fever and tachycardia. Likely secondary to perforated gallbladder seen on CT scan. MRCP findings suggestive of perforated cholecystitis with intrahepatic abscess. U/A, RVP negative. CTA neg for PE. Blood and urine cultures negative.   Improved.     - continue w/ zosyn   - scheduled for perc dimple w/ drain today   - trend WBC and fever curve

## 2024-05-05 NOTE — PROGRESS NOTE ADULT - PROBLEM SELECTOR PLAN 2
CT findings c/f cholelithiasis with perforation and possible abscess. HIDA scan negative. MRCP shows   Choledocholithiasis resulting in biliary ductal dilatation. Also w/ cholelithiasis with discontinuity of the inflamed gallbladder wall contiguous with a 4.3 cm multiloculated collection that is most compatible with perforated cholecystitis with intrahepatic abscess.     - gen surg consulted -> recommend further MRI abd/pelvis for eval. not a surgical candidate at this time   - f/u IR recs   -ABx coverage as above Pt previously treated at [facility] for pneumonia. Also w/ hx of COPD not on any medications or home medications. Suspect AHRF 2/2 to COPD exacerbation.  Improving    - duonebs  - budesonide  - started prednisone 40mg qd (5/4- )  - continue azithromycin (5/4- )   - wean oxygen as tolerated  - CPAP at night for SUZIE

## 2024-05-05 NOTE — PROGRESS NOTE ADULT - PROBLEM SELECTOR PLAN 3
CT A/P suggestive of ovarian malignancy w/ carcinomatosis. 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   Elevated tumor marker: CEA 4.2, Ca 19-9 110, CA-125 801.     - f/u gyn onc recs   - IR consult for tissue biopsy CT A/P suggestive of ovarian malignancy w/ carcinomatosis. 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   Elevated tumor marker: CEA 4.2, Ca 19-9 110, CA-125 801.     - f/u gyn onc recs -> outpatient biopsy

## 2024-05-05 NOTE — PROGRESS NOTE ADULT - PROBLEM SELECTOR PLAN 1
Met sepsis criteria w/ leukocytosis, tachypnea, fever and tachycardia. Likely secondary to perforated gallbladder seen on CT scan. MRCP findings suggestive of perforated cholecystitis with intrahepatic abscess. U/A, RVP negative. CTA neg for PE. Blood and urine cultures negative.     - continue w/ zosyn   - scheduled for perc dimple w/ drain today   - trend WBC and fever curve CT findings c/f cholelithiasis with perforation and possible abscess. HIDA scan negative. MRCP shows   Choledocholithiasis resulting in biliary ductal dilatation. Also w/ cholelithiasis with discontinuity of the inflamed gallbladder wall contiguous with a 4.3 cm multiloculated collection that is most compatible with perforated cholecystitis with intrahepatic abscess.   s/p perc dimple w/ IR 5/3. culture w/ moderate E coli and few enterococcus faecium    - monitor drain output   - continue zosyn CT findings c/f cholelithiasis with perforation and possible abscess. HIDA scan negative. MRCP shows   Choledocholithiasis resulting in biliary ductal dilatation. Also w/ cholelithiasis with discontinuity of the inflamed gallbladder wall contiguous with a 4.3 cm multiloculated collection that is most compatible with perforated cholecystitis with intrahepatic abscess.   s/p perc dimple w/ IR 5/3. culture w/ moderate E coli and few enterococcus faecium    - monitor drain output   - continue zosyn (5/1-)

## 2024-05-05 NOTE — PROGRESS NOTE ADULT - SUBJECTIVE AND OBJECTIVE BOX
*******************************  Alejandra Mota MD (PGY-1)  Internal Medicine  Contact via [employer] TEAMS  *******************************    JUSTIN KEYES  83y  Female    Patient is a 83y old  Female who presents with a chief complaint of Sepsis, perforated gallbladder, sob, poss metastatic Ovarian CA (04 May 2024 09:50)      Subjective:    Objective:  T(C): 37.1 (05-05-24 @ 05:05), Max: 37.1 (05-04-24 @ 20:42)  HR: 83 (05-05-24 @ 05:05) (80 - 88)  BP: 122/60 (05-05-24 @ 05:05) (122/60 - 139/92)  RR: 20 (05-05-24 @ 05:05) (20 - 21)  SpO2: 94% (05-05-24 @ 05:05) (94% - 99%)  I&O's Summary    04 May 2024 07:01  -  05 May 2024 07:00  --------------------------------------------------------  IN: 240 mL / OUT: 450 mL / NET: -210 mL        PHYSICAL EXAM:  GENERAL: NAD  HEAD:  Atraumatic, Normocephalic  EYES: EOMI, PERRLA, conjunctiva and sclera clear  ENMT: Moist mucous membranes  NECK: Supple, No JVD, trachea midline   NERVOUS SYSTEM:  Alert & Oriented X3, Good concentration; Motor Strength 5/5 B/L upper and lower extremities; DTRs 2+ intact and symmetric  CHEST/LUNG: Clear to auscultation bilaterally; No rales, rhonchi, wheezing, or rubs  HEART: Regular rate and rhythm; No murmurs, rubs, or gallops  ABDOMEN: Soft, Nontender, Nondistended; Bowel sounds present  EXTREMITIES:  2+ Peripheral Pulses, No clubbing, cyanosis, or edema  LYMPH: No lymphadenopathy noted  SKIN: No rashes or lesions    MEDICATIONS  (STANDING):  albuterol/ipratropium for Nebulization 3 milliLiter(s) Nebulizer every 6 hours  azithromycin  IVPB 500 milliGRAM(s) IV Intermittent every 24 hours  buDESOnide    Inhalation Suspension 0.25 milliGRAM(s) Inhalation two times a day  chlorhexidine 4% Liquid 1 Application(s) Topical daily  gabapentin 800 milliGRAM(s) Oral every 12 hours  levothyroxine 50 MICROGram(s) Oral daily  loratadine 10 milliGRAM(s) Oral daily  mupirocin 2% Nasal 1 Application(s) Both Nostrils two times a day  nystatin Powder 1 Application(s) Topical three times a day  piperacillin/tazobactam IVPB.. 3.375 Gram(s) IV Intermittent every 8 hours  polyethylene glycol 3350 17 Gram(s) Oral daily  predniSONE   Tablet 40 milliGRAM(s) Oral daily  senna 2 Tablet(s) Oral at bedtime    MEDICATIONS  (PRN):  acetaminophen     Tablet .. 650 milliGRAM(s) Oral every 6 hours PRN Temp greater or equal to 38C (100.4F), Mild Pain (1 - 3)  oxyCODONE    IR 5 milliGRAM(s) Oral every 4 hours PRN Moderate Pain (4 - 6)      LABS:        CAPILLARY BLOOD GLUCOSE          RADIOLOGY & ADDITIONAL TESTS:               *******************************  Alejandra Mota MD (PGY-1)  Internal Medicine  Contact via [employer] TEAMS  *******************************    JUSTIN KEYES  83y  Female    Patient is a 83y old  Female who presents with a chief complaint of Sepsis, perforated gallbladder, sob, poss metastatic Ovarian CA (04 May 2024 09:50)    Subjective: No acute events overnight. Patient seen at bedside this morning. Noted improvement in SOB. Denied any fever, chills, chest pain, palpitations, ab pain, n/v.     Objective:  T(C): 37.1 (05-05-24 @ 05:05), Max: 37.1 (05-04-24 @ 20:42)  HR: 83 (05-05-24 @ 05:05) (80 - 88)  BP: 122/60 (05-05-24 @ 05:05) (122/60 - 139/92)  RR: 20 (05-05-24 @ 05:05) (20 - 21)  SpO2: 94% (05-05-24 @ 05:05) (94% - 99%)  I&O's Summary    04 May 2024 07:01  -  05 May 2024 07:00  --------------------------------------------------------  IN: 240 mL / OUT: 450 mL / NET: -210 mL    PHYSICAL EXAM:  GENERAL: NAD, obese  HEAD:  Atraumatic, Normocephalic  EYES: EOMI, PERRLA, conjunctiva and sclera clear  ENMT: Moist mucous membranes  NECK: Supple, No JVD, trachea midline   NERVOUS SYSTEM:  Alert & Oriented X3, Good concentration  CHEST/LUNG: Clear to auscultation bilaterally on anterior exam; No rales, rhonchi, wheezing, or rubs  HEART: Regular rate and rhythm; No murmurs, rubs, or gallops  ABDOMEN: Soft, Nontender, Nondistended; Bowel sounds present  EXTREMITIES:  2+ Peripheral Pulses, No clubbing, cyanosis, or edema  SKIN: No rashes or lesions    MEDICATIONS  (STANDING):  albuterol/ipratropium for Nebulization 3 milliLiter(s) Nebulizer every 6 hours  azithromycin  IVPB 500 milliGRAM(s) IV Intermittent every 24 hours  buDESOnide    Inhalation Suspension 0.25 milliGRAM(s) Inhalation two times a day  chlorhexidine 4% Liquid 1 Application(s) Topical daily  gabapentin 800 milliGRAM(s) Oral every 12 hours  levothyroxine 50 MICROGram(s) Oral daily  loratadine 10 milliGRAM(s) Oral daily  mupirocin 2% Nasal 1 Application(s) Both Nostrils two times a day  nystatin Powder 1 Application(s) Topical three times a day  piperacillin/tazobactam IVPB.. 3.375 Gram(s) IV Intermittent every 8 hours  polyethylene glycol 3350 17 Gram(s) Oral daily  predniSONE   Tablet 40 milliGRAM(s) Oral daily  senna 2 Tablet(s) Oral at bedtime    MEDICATIONS  (PRN):  acetaminophen     Tablet .. 650 milliGRAM(s) Oral every 6 hours PRN Temp greater or equal to 38C (100.4F), Mild Pain (1 - 3)  oxyCODONE    IR 5 milliGRAM(s) Oral every 4 hours PRN Moderate Pain (4 - 6)    LABS:                      9.3    10.45 )-----------( 226      ( 05 May 2024 06:57 )             29.0     05-05    136  |  99  |  10  ----------------------------<  159<H>  3.9   |  26  |  0.52    Ca    8.5      05 May 2024 07:02  Phos  2.9     05-05  Mg     1.8     05-05    TPro  5.9<L>  /  Alb  2.6<L>  /  TBili  0.5  /  DBili  x   /  AST  15  /  ALT  15  /  AlkPhos  85  05-05    Culture Results:   Moderate Escherichia coli  Few Enterococcus faecium (05-03 @ 17:11)  Culture Results:   No growth at 48 Hours (05-03 @ 01:08)  Culture Results:   No growth at 48 Hours (05-03 @ 01:08)  Culture Results:   <10,000 CFU/mL Normal Urogenital Ijeoma (04-30 @ 23:24)  Culture Results:   No growth at 4 days (04-30 @ 21:17)  Culture Results:   No growth at 4 days (04-30 @ 21:00)    CAPILLARY BLOOD GLUCOSE          RADIOLOGY & ADDITIONAL TESTS:               *******************************  Alejandra Mota MD (PGY-1)  Internal Medicine  Contact via [employer] TEAMS  *******************************    JUSTIN KEYES  83y  Female    Patient is a 83y old  Female who presents with a chief complaint of Sepsis, perforated gallbladder, sob, poss metastatic Ovarian CA (04 May 2024 09:50)    Subjective: No acute events overnight. Patient seen at bedside this morning. Noted improvement in SOB. Denied any fever, chills, chest pain, palpitations, ab pain, n/v.     Objective:  T(C): 37.1 (05-05-24 @ 05:05), Max: 37.1 (05-04-24 @ 20:42)  HR: 83 (05-05-24 @ 05:05) (80 - 88)  BP: 122/60 (05-05-24 @ 05:05) (122/60 - 139/92)  RR: 20 (05-05-24 @ 05:05) (20 - 21)  SpO2: 94% (05-05-24 @ 05:05) (94% - 99%)  I&O's Summary    04 May 2024 07:01  -  05 May 2024 07:00  --------------------------------------------------------  IN: 240 mL / OUT: 450 mL / NET: -210 mL    PHYSICAL EXAM:  GENERAL: NAD, obese  HEAD:  Atraumatic, Normocephalic  EYES: EOMI, PERRLA, conjunctiva and sclera clear  ENMT: Moist mucous membranes  NECK: Supple, No JVD, trachea midline   NERVOUS SYSTEM:  Alert & Oriented X3, Good concentration  CHEST/LUNG: Clear to auscultation bilaterally on anterior exam; No rales, rhonchi, wheezing, or rubs  HEART: Regular rate and rhythm; No murmurs, rubs, or gallops  ABDOMEN: Soft, Nontender, Nondistended; Bowel sounds present  EXTREMITIES:  2+ Peripheral Pulses, No clubbing, cyanosis, or edema  SKIN: No rashes or lesions    MEDICATIONS  (STANDING):  albuterol/ipratropium for Nebulization 3 milliLiter(s) Nebulizer every 6 hours  azithromycin  IVPB 500 milliGRAM(s) IV Intermittent every 24 hours  buDESOnide    Inhalation Suspension 0.25 milliGRAM(s) Inhalation two times a day  chlorhexidine 4% Liquid 1 Application(s) Topical daily  gabapentin 800 milliGRAM(s) Oral every 12 hours  levothyroxine 50 MICROGram(s) Oral daily  loratadine 10 milliGRAM(s) Oral daily  mupirocin 2% Nasal 1 Application(s) Both Nostrils two times a day  nystatin Powder 1 Application(s) Topical three times a day  piperacillin/tazobactam IVPB.. 3.375 Gram(s) IV Intermittent every 8 hours  polyethylene glycol 3350 17 Gram(s) Oral daily  predniSONE   Tablet 40 milliGRAM(s) Oral daily  senna 2 Tablet(s) Oral at bedtime    MEDICATIONS  (PRN):  acetaminophen     Tablet .. 650 milliGRAM(s) Oral every 6 hours PRN Temp greater or equal to 38C (100.4F), Mild Pain (1 - 3)  oxyCODONE    IR 5 milliGRAM(s) Oral every 4 hours PRN Moderate Pain (4 - 6)    LABS:                      9.3    10.45 )-----------( 226      ( 05 May 2024 06:57 )             29.0     05-05    136  |  99  |  10  ----------------------------<  159<H>  3.9   |  26  |  0.52    Ca    8.5      05 May 2024 07:02  Phos  2.9     05-05  Mg     1.8     05-05    TPro  5.9<L>  /  Alb  2.6<L>  /  TBili  0.5  /  DBili  x   /  AST  15  /  ALT  15  /  AlkPhos  85  05-05    Culture Results:   Moderate Escherichia coli  Few Enterococcus faecium (05-03 @ 17:11)  Culture Results:   No growth at 48 Hours (05-03 @ 01:08)  Culture Results:   No growth at 48 Hours (05-03 @ 01:08)  Culture Results:   <10,000 CFU/mL Normal Urogenital Ijeoma (04-30 @ 23:24)  Culture Results:   No growth at 4 days (04-30 @ 21:17)  Culture Results:   No growth at 4 days (04-30 @ 21:00)    CAPILLARY BLOOD GLUCOSE      RADIOLOGY & ADDITIONAL TESTS:

## 2024-05-06 ENCOUNTER — TRANSCRIPTION ENCOUNTER (OUTPATIENT)
Age: 84
End: 2024-05-06

## 2024-05-06 DIAGNOSIS — K75.0 ABSCESS OF LIVER: ICD-10-CM

## 2024-05-06 DIAGNOSIS — K80.50 CALCULUS OF BILE DUCT WITHOUT CHOLANGITIS OR CHOLECYSTITIS WITHOUT OBSTRUCTION: ICD-10-CM

## 2024-05-06 LAB
-  IMIPENEM: 8 — SIGNIFICANT CHANGE UP
-  PENICILLIN: SIGNIFICANT CHANGE UP
ALBUMIN SERPL ELPH-MCNC: 2.9 G/DL — LOW (ref 3.3–5)
ALP SERPL-CCNC: 87 U/L — SIGNIFICANT CHANGE UP (ref 40–120)
ALT FLD-CCNC: 19 U/L — SIGNIFICANT CHANGE UP (ref 10–45)
ANION GAP SERPL CALC-SCNC: 11 MMOL/L — SIGNIFICANT CHANGE UP (ref 5–17)
AST SERPL-CCNC: 18 U/L — SIGNIFICANT CHANGE UP (ref 10–40)
BILIRUB SERPL-MCNC: 0.3 MG/DL — SIGNIFICANT CHANGE UP (ref 0.2–1.2)
BUN SERPL-MCNC: 15 MG/DL — SIGNIFICANT CHANGE UP (ref 7–23)
CALCIUM SERPL-MCNC: 8.7 MG/DL — SIGNIFICANT CHANGE UP (ref 8.4–10.5)
CHLORIDE SERPL-SCNC: 97 MMOL/L — SIGNIFICANT CHANGE UP (ref 96–108)
CO2 SERPL-SCNC: 28 MMOL/L — SIGNIFICANT CHANGE UP (ref 22–31)
CREAT SERPL-MCNC: 0.56 MG/DL — SIGNIFICANT CHANGE UP (ref 0.5–1.3)
CULTURE RESULTS: SIGNIFICANT CHANGE UP
CULTURE RESULTS: SIGNIFICANT CHANGE UP
EGFR: 91 ML/MIN/1.73M2 — SIGNIFICANT CHANGE UP
GLUCOSE SERPL-MCNC: 158 MG/DL — HIGH (ref 70–99)
HCT VFR BLD CALC: 30.7 % — LOW (ref 34.5–45)
HGB BLD-MCNC: 9.7 G/DL — LOW (ref 11.5–15.5)
MAGNESIUM SERPL-MCNC: 2 MG/DL — SIGNIFICANT CHANGE UP (ref 1.6–2.6)
MCHC RBC-ENTMCNC: 28.5 PG — SIGNIFICANT CHANGE UP (ref 27–34)
MCHC RBC-ENTMCNC: 31.6 GM/DL — LOW (ref 32–36)
MCV RBC AUTO: 90.3 FL — SIGNIFICANT CHANGE UP (ref 80–100)
NRBC # BLD: 0 /100 WBCS — SIGNIFICANT CHANGE UP (ref 0–0)
PHOSPHATE SERPL-MCNC: 2.8 MG/DL — SIGNIFICANT CHANGE UP (ref 2.5–4.5)
PLATELET # BLD AUTO: 266 K/UL — SIGNIFICANT CHANGE UP (ref 150–400)
POTASSIUM SERPL-MCNC: 4.1 MMOL/L — SIGNIFICANT CHANGE UP (ref 3.5–5.3)
POTASSIUM SERPL-SCNC: 4.1 MMOL/L — SIGNIFICANT CHANGE UP (ref 3.5–5.3)
PROT SERPL-MCNC: 6.4 G/DL — SIGNIFICANT CHANGE UP (ref 6–8.3)
RBC # BLD: 3.4 M/UL — LOW (ref 3.8–5.2)
RBC # FLD: 15.1 % — HIGH (ref 10.3–14.5)
SODIUM SERPL-SCNC: 136 MMOL/L — SIGNIFICANT CHANGE UP (ref 135–145)
SPECIMEN SOURCE: SIGNIFICANT CHANGE UP
SPECIMEN SOURCE: SIGNIFICANT CHANGE UP
WBC # BLD: 11.26 K/UL — HIGH (ref 3.8–10.5)
WBC # FLD AUTO: 11.26 K/UL — HIGH (ref 3.8–10.5)

## 2024-05-06 PROCEDURE — 99232 SBSQ HOSP IP/OBS MODERATE 35: CPT | Mod: GC

## 2024-05-06 PROCEDURE — 99231 SBSQ HOSP IP/OBS SF/LOW 25: CPT

## 2024-05-06 PROCEDURE — 99233 SBSQ HOSP IP/OBS HIGH 50: CPT | Mod: GC

## 2024-05-06 PROCEDURE — 99223 1ST HOSP IP/OBS HIGH 75: CPT

## 2024-05-06 RX ORDER — AMPICILLIN TRIHYDRATE 250 MG
2 CAPSULE ORAL EVERY 6 HOURS
Refills: 0 | Status: DISCONTINUED | OUTPATIENT
Start: 2024-05-06 | End: 2024-05-10

## 2024-05-06 RX ORDER — ERTAPENEM SODIUM 1 G/1
1000 INJECTION, POWDER, LYOPHILIZED, FOR SOLUTION INTRAMUSCULAR; INTRAVENOUS EVERY 24 HOURS
Refills: 0 | Status: DISCONTINUED | OUTPATIENT
Start: 2024-05-06 | End: 2024-05-10

## 2024-05-06 RX ADMIN — GABAPENTIN 800 MILLIGRAM(S): 400 CAPSULE ORAL at 17:13

## 2024-05-06 RX ADMIN — Medication 40 MILLIGRAM(S): at 06:19

## 2024-05-06 RX ADMIN — Medication 3 MILLILITER(S): at 06:20

## 2024-05-06 RX ADMIN — Medication 3 MILLILITER(S): at 17:12

## 2024-05-06 RX ADMIN — Medication 50 MICROGRAM(S): at 06:19

## 2024-05-06 RX ADMIN — NYSTATIN CREAM 1 APPLICATION(S): 100000 CREAM TOPICAL at 21:34

## 2024-05-06 RX ADMIN — Medication 200 GRAM(S): at 17:11

## 2024-05-06 RX ADMIN — MEROPENEM 100 MILLIGRAM(S): 1 INJECTION INTRAVENOUS at 05:31

## 2024-05-06 RX ADMIN — ERTAPENEM SODIUM 120 MILLIGRAM(S): 1 INJECTION, POWDER, LYOPHILIZED, FOR SOLUTION INTRAMUSCULAR; INTRAVENOUS at 17:11

## 2024-05-06 RX ADMIN — POLYETHYLENE GLYCOL 3350 17 GRAM(S): 17 POWDER, FOR SOLUTION ORAL at 12:59

## 2024-05-06 RX ADMIN — Medication 0.25 MILLIGRAM(S): at 06:31

## 2024-05-06 RX ADMIN — Medication 3 MILLILITER(S): at 23:52

## 2024-05-06 RX ADMIN — NYSTATIN CREAM 1 APPLICATION(S): 100000 CREAM TOPICAL at 06:20

## 2024-05-06 RX ADMIN — Medication 0.25 MILLIGRAM(S): at 17:11

## 2024-05-06 RX ADMIN — LORATADINE 10 MILLIGRAM(S): 10 TABLET ORAL at 11:19

## 2024-05-06 RX ADMIN — MEROPENEM 100 MILLIGRAM(S): 1 INJECTION INTRAVENOUS at 12:59

## 2024-05-06 RX ADMIN — CHLORHEXIDINE GLUCONATE 1 APPLICATION(S): 213 SOLUTION TOPICAL at 11:19

## 2024-05-06 RX ADMIN — AZITHROMYCIN 255 MILLIGRAM(S): 500 TABLET, FILM COATED ORAL at 05:31

## 2024-05-06 RX ADMIN — GABAPENTIN 800 MILLIGRAM(S): 400 CAPSULE ORAL at 06:19

## 2024-05-06 RX ADMIN — Medication 5 MILLIGRAM(S): at 22:33

## 2024-05-06 RX ADMIN — SENNA PLUS 2 TABLET(S): 8.6 TABLET ORAL at 21:34

## 2024-05-06 RX ADMIN — MUPIROCIN 1 APPLICATION(S): 20 OINTMENT TOPICAL at 06:20

## 2024-05-06 RX ADMIN — NYSTATIN CREAM 1 APPLICATION(S): 100000 CREAM TOPICAL at 13:03

## 2024-05-06 RX ADMIN — MUPIROCIN 1 APPLICATION(S): 20 OINTMENT TOPICAL at 17:13

## 2024-05-06 RX ADMIN — Medication 3 MILLILITER(S): at 11:18

## 2024-05-06 RX ADMIN — Medication 200 GRAM(S): at 23:52

## 2024-05-06 NOTE — PROGRESS NOTE ADULT - ASSESSMENT
Perforated gallbladder  Liver lesion, probably abscess related to adjacent perforated GB  Absence of tracer on the NM SPECT suggests lack of active connection between liver lesion and GB.  Choledocholithiasis with CBD dilatation, ERCP planned  Would question whether this process actually began back in February  Peritoneal studding worrisome for carcinomatosis in the setting of ovarian mass, bx deferred  ?COPD. Lung fields quiet at present. Steroids make her abdominal exam and leukocytosis harder to interpret, and would hope to limit their use in the setting of active infection.     Suggestions--  Will see if we're able to alter antibiotics for monotherapy to include the Enterococcus- not great options, potentially Imipenem but not ideal  For now continue meropenem  Plan for prolonged course of antibiotics  Await ERCP  Decrease steroids as able  Stop Azithromycin  Eventual interval imaging to assess impact of antibiotics and GB drainage on lesion- if not improving may need dedicated drainage  Trend CBC, CMP  Serial abdominal exams  Monitor drain output    D/W patient and her son  D/W Dr. Mota    Thank you for the courtesy of this referral.  Vincent Skinner MD  Attending Physician  [facility]  Division of Infectious Diseases  [phone]

## 2024-05-06 NOTE — PROGRESS NOTE ADULT - PROBLEM SELECTOR PLAN 5
-Defer given possible infection, restart home meds as tolerated Met sepsis criteria w/ leukocytosis, tachypnea, fever and tachycardia. Likely secondary to perforated gallbladder seen on CT scan. MRCP findings suggestive of perforated cholecystitis with intrahepatic abscess. U/A, RVP negative. CTA neg for PE. Blood and urine cultures negative.   Improved.     - continue w/ abx   - trend WBC and fever curve

## 2024-05-06 NOTE — DISCHARGE NOTE PROVIDER - NSDCMRMEDTOKEN_GEN_ALL_CORE_FT
Advair Diskus 250 mcg-50 mcg inhalation powder: 1 puff(s) inhaled 2 times a day  alendronate 70 mg oral tablet: 1 tab(s) orally once a week on Sundays  amLODIPine 5 mg oral tablet: 1 tab(s) orally once a day  Aspirin Enteric Coated 81 mg oral delayed release tablet: 1 tab(s) orally once a day  baclofen 10 mg oral tablet: 1 tab(s) orally 3 times a day, As Needed  Calcium 500+D oral tablet, chewable: 1 tab(s) orally 2 times a day  capsaicin 0.075% topical cream: 1 application topically 2 times a day  famotidine 20 mg oral tablet: 1 tab(s) orally 3 times a day  Flonase 50 mcg/inh nasal spray: 2 spray(s) nasal once a day  furosemide 20 mg oral tablet: 1 tab(s) orally once a day  gabapentin 800 mg oral tablet: 1 tab(s) orally 3 times a day  levothyroxine 50 mcg (0.05 mg) oral tablet: 1 tab(s) orally once a day  loratadine 10 mg oral tablet: 1 tab(s) orally once a day  metoprolol tartrate 25 mg oral tablet: 0.5 tab(s) orally every 12 hours  oxyBUTYnin 10 mg/24 hr oral tablet, extended release: 1 tab(s) orally once a day  potassium chloride 20 mEq oral tablet, extended release: 2 tab(s) orally once a day  pravastatin 10 mg oral tablet: 1 tab(s) orally once a day (at bedtime)  Singulair 10 mg oral tablet: 1 tab(s) orally once a day  sulfaSALAzine 500 mg oral delayed release tablet: 2 tab(s) orally every 12 hours  Synthroid 50 mcg (0.05 mg) oral tablet: 1 tab(s) orally once a day   Advair Diskus 250 mcg-50 mcg inhalation powder: 1 puff(s) inhaled 2 times a day  alendronate 70 mg oral tablet: 1 tab(s) orally once a week on Sundays  ampicillin: 2 gram(s) intravenous every 6 hours until 6/17  Aspirin Enteric Coated 81 mg oral delayed release tablet: 1 tab(s) orally once a day  baclofen 10 mg oral tablet: 1 tab(s) orally 3 times a day, As Needed  Calcium 500+D oral tablet, chewable: 1 tab(s) orally 2 times a day  ertapenem 1 g injection: 1 gram(s) injectable once a day ertapenem 1g qd until 6/17  Flonase 50 mcg/inh nasal spray: 2 spray(s) nasal once a day  furosemide 20 mg oral tablet: 1 tab(s) orally once a day  gabapentin 800 mg oral tablet: 1 tab(s) orally 3 times a day  loratadine 10 mg oral tablet: 1 tab(s) orally once a day  metoprolol tartrate 25 mg oral tablet: 0.5 tab(s) orally every 12 hours  oxyBUTYnin 10 mg/24 hr oral tablet, extended release: 1 tab(s) orally once a day  polyethylene glycol 3350 oral powder for reconstitution: 17 gram(s) orally 2 times a day  pravastatin 10 mg oral tablet: 1 tab(s) orally once a day (at bedtime)  senna leaf extract oral tablet: 2 tab(s) orally once a day (at bedtime)  Singulair 10 mg oral tablet: 1 tab(s) orally once a day  sulfaSALAzine 500 mg oral delayed release tablet: 2 tab(s) orally every 12 hours  Synthroid 50 mcg (0.05 mg) oral tablet: 1 tab(s) orally once a day

## 2024-05-06 NOTE — PROGRESS NOTE ADULT - SUBJECTIVE AND OBJECTIVE BOX
[facility]  Division of Infectious Diseases  [phone]    JUSTIN KEYES  83y, Female  [id number]    HPI--  83F resident of [facility], presented with abdominal pain, "agita"/GERD-like symptoms, fever, leukocytosis, abdominal pain, nausea, vomiting, confusion and subsequently note to have perforated cholecystitis and adjactent liver lesion concerning for abscess. Patient overall improved with antibiotics and percutaneous cholecystotomy.     MRI concerning for peritoneal carcinomatosis. Bx deferred to outpatient setting.    Patient also given azithro/steroids for ?COPD.     Patient thinks she was acutely ill for about 1 week prior to admission.   Of note, she was treated for a "UTI" in February. Patient states had N/V abdominal pain. U/A had pyurian  and urine cx grew E. coli ESBL. Patients states she was treated with 2 weeks of IV antibiotics. However patient also states she had no urinary symptoms (.e.g frequency, dysuria) at that time. She also states had elevated LFT at that time, perhaps thought to be related to medications (many of which were stopped).       PMH/PSH--  Hypothyroid    Chronic bronchitis    Psoriatic arthritis    Osteoarthritis    Overactive bladder    Basal cell carcinoma    Squamous cell carcinoma    History of carpal tunnel release    H/O total shoulder replacement, left    H/O:         Allergies--  Milk (Rash)  latex (Rash)  erythromycin (Rash)  phenobarbital (Rash)      Medications--  Antibiotics: azithromycin  IVPB 500 milliGRAM(s) IV Intermittent every 24 hours  meropenem  IVPB 1000 milliGRAM(s) IV Intermittent every 8 hours    Immunologic:   Other: acetaminophen     Tablet .. PRN  albuterol/ipratropium for Nebulization  buDESOnide    Inhalation Suspension  chlorhexidine 4% Liquid  gabapentin  levothyroxine  loratadine  mupirocin 2% Nasal  nystatin Powder  oxyCODONE    IR PRN  polyethylene glycol 3350  predniSONE   Tablet  senna    Antimicrobials last 90 days per EMR: MEDICATIONS  (STANDING):  azithromycin  IVPB   255 mL/Hr IV Intermittent (24 @ 05:31)   255 mL/Hr IV Intermittent (24 @ 04:32)    cefTRIAXone   IVPB   100 mL/Hr IV Intermittent (24 @ 21:09)    meropenem  IVPB   100 mL/Hr IV Intermittent (24 @ 12:59)   100 mL/Hr IV Intermittent (24 @ 05:31)   100 mL/Hr IV Intermittent (24 @ 21:04)    piperacillin/tazobactam IVPB.   200 mL/Hr IV Intermittent (24 @ 07:24)    piperacillin/tazobactam IVPB.-   25 mL/Hr IV Intermittent (24 @ 10:28)    piperacillin/tazobactam IVPB.-   25 mL/Hr IV Intermittent (24 @ 16:17)    piperacillin/tazobactam IVPB..   25 mL/Hr IV Intermittent (24 @ 13:28)   25 mL/Hr IV Intermittent (24 @ 06:06)   25 mL/Hr IV Intermittent (24 @ 23:03)   25 mL/Hr IV Intermittent (24 @ 14:26)   25 mL/Hr IV Intermittent (24 @ 05:48)   25 mL/Hr IV Intermittent (24 @ 21:58)   25 mL/Hr IV Intermittent (24 @ 13:45)   25 mL/Hr IV Intermittent (24 @ 05:13)   25 mL/Hr IV Intermittent (24 @ 21:09)   25 mL/Hr IV Intermittent (24 @ 13:08)   25 mL/Hr IV Intermittent (24 @ 06:12)   25 mL/Hr IV Intermittent (24 @ 22:54)        Social History--  EtOH: denies active  Tobacco: denies active  Drug Use: denies active    Family/Marital History--  Family history of CHF (congestive heart failure) (Father)    Family history of hypertension in mother (Mother)        Review of Systems:  A >=10-point review of systems was obtained.   Review of systems otherwise negative except as previously noted.    Physical Exam--  Vital Signs: T(F): 97.9 (24 @ 12:00), Max: 98.1 (24 @ 20:45)  HR: 71 (24 @ 12:00)  BP: 121/64 (24 @ 12:00)  RR: 20 (24 @ 12:00)  SpO2: 98% (24 @ 12:00)  Wt(kg): --  General: Nontoxic-appearing Female in no acute distress.  HEENT: AT/NC. Anicteric. Conjunctiva pink and moist. Oropharynx clear.  Neck: Not rigid. No sense of mass.  Nodes: None palpable.  Lungs: Diminished BS B no RWR  Heart: Regular rate and rhythm.   Abdomen: Bowel sounds present and normoactive. Soft. Nondistended. Mildy tender RUQ no G/R. Drain- yellow-sonja slightly cloudy fluid.   Extremities: No cyanosis or clubbing. No edema. Venous insuffciency changes LE B.  Skin: Warm. Dry. Good turgor. No vasculitic stigmata.  Psychiatric: Appropriate affect and mood for situation.         Laboratory & Imaging Data--  CBC                        9.7    11.26 )-----------( 266      ( 06 May 2024 06:37 )             30.7     WBC trend  WBC Count: 11.26 K/uL (24 @ 06:37)  WBC Count: 10.45 K/uL (24 @ 06:57)  WBC Count: 11.80 K/uL (24 @ 06:51)  WBC Count: 13.25 K/uL (24 @ 04:21)  WBC Count: 13.12 K/uL (24 @ 07:03)      Chemistries      136  |  97  |  15  ----------------------------<  158<H>  4.1   |  28  |  0.56    Ca    8.7      06 May 2024 06:37  Phos  2.8       Mg     2.0           LFT Trend  Total Bilirubin  0.3 mg/dL (24 @ 06:37)  0.5 mg/dL (24 @ 07:02)  0.6 mg/dL (24 @ 06:51)  0.7 mg/dL (24 @ 04:21)  0.8 mg/dL (24 @ 07:03)  0.5 mg/dL (24 @ 21:30)    Direct Bilirubin    Indirect Bilirubin    AKP  87 U/L (24 @ 06:37)  85 U/L (24 @ 07:02)  102 U/L (24 @ 06:51)  109 U/L (24 @ 04:21)  123 U/L (24 @ 07:03)  120 U/L (24 @ 21:30)    AST  18 U/L (24 @ 06:37)  15 U/L (24 @ 07:02)  18 U/L (24 @ 06:51)  25 U/L (24 @ 04:21)  56 U/L (24 @ 07:03)  83 U/L (24 @ 21:30)    ALT  19 U/L (24 @ :37)  15 U/L (24 @ 07:02)  24 U/L (24 @ 06:51)  33 U/L (24 @ 04:21)  51 U/L (24 @ 07:03)  56 U/L (24 @ 21:30)      Other 2024 data: via HIE  Acute hep panel c/w prior HBV exposure  HAV IgG+/IgM neg  Blood cx neg x1 set  RVP neg    : AST 1233, , total bilirubin 2.0, alkaline phosphatase 264  : , , total bilirubin 0.8, alkaline phosphatase 171  : AST 32, ALT 99, total bilirubin 0.7, alkaline phosphatase 144  : LFTs all normal      Culture Data  Culture - Body Fluid with Gram Stain (collected 03 May 2024 17:11)  Source: Bile Bile Fluid  Gram Stain (04 May 2024 06:18):    polymorphonuclear leukocytes seen    Gram Negative Rods seen    by cytocentrifuge  Preliminary Report (05 May 2024 17:20):    Moderate Escherichia coli ESBL    Few Enterococcus faecium  Organism: Escherichia coli ESBL  Enterococcus faecium (05 May 2024 17:20)  Organism: Escherichia coli ESBL (05 May 2024 17:20)  Organism: Enterococcus faecium (05 May 2024 17:20)    Culture - Blood (collected 03 May 2024 01:08)  Source: .Blood Blood-Peripheral  Preliminary Report (06 May 2024 05:01):    No growth at 72 Hours    Culture - Blood (collected 03 May 2024 01:08)  Source: .Blood Blood-Peripheral  Preliminary Report (06 May 2024 05:01):    No growth at 72 Hours    Culture - Urine (collected 2024 23:24)  Source: Clean Catch Clean Catch (Midstream)  Final Report (02 May 2024 00:54):    <10,000 CFU/mL Normal Urogenital Ijeoma    Culture - Blood (collected 2024 21:17)  Source: .Blood Blood-Peripheral  Final Report (06 May 2024 01:01):    No growth at 5 days    Culture - Blood (collected 2024 21:00)  Source: .Blood Blood-Peripheral  Final Report (06 May 2024 01:01):    No growth at 5 days      Imaging- all of below personally reviewed  < from: MR MRCP w/wo IV Cont (24 @ 22:29) >  FINDINGS:  LOWER CHEST: Small left and trace right pleural effusions with adjacent   passive atelectasis in the left lower lobe, as on earlier same day CT.  LIVER/GALLBLADDER: Cholelithiasis in a nondistended, but inflamed   gallbladder, with gallbladder wall thickening and irregularity, most   compatible with gangrenous/perforated cholecystitis. Evidence of   perforated cholecystitis with discontinuity of the gallbladder wall at   the liver surface contiguous with a 4.3 x 2.7 x 4.2 cm multiloculated   fluid collection/abscess in segment 5 (abdomen 26:21). Remainder of the   liver demonstrates a couple thinly septated cysts including a 1.1 cm cyst   in the caudate and a 1.2 cm cyst in the lateral left lobe. Additionally,   multiple serosal tumor deposits along the liver surface, further   described below.  BILE DUCTS: Mild intra and extra hepatic biliary ductal dilatation with   sludge and multiple filling defects in the extrahepatic common bile duct,   compatible with stones.  SPLEEN: Within normal limits.  PANCREAS: Within normal limits.  ADRENALS: Within normal limits.  KIDNEYS/URETERS: A small left renal cyst.  Evaluation of the pelvic organs is limited by artifact.  BLADDER: Within normal limits.  REPRODUCTIVE ORGANS:  Uterus: Anteverted anteflexed measuring 10.8 x 3.7 x 5.7 cm.  Endometrium: Endometrium is distended to 1.4 cm at the fundus with T1   hyperintense signal without discrete enhancement, favoring hemorrhagic   debris versus less likely underlying lesion.  Right ovary: Not discretely visualized, possibly obscured by peritoneal   carcinomatosis.  Left ovary/adnexa: Replaced by a T2 heterogeneous ovoid mass measuring   4.2 x 3.8 x 5.3 cm, highly suspicious for neoplasm.  BOWEL: No bowel obstruction.  PERITONEUM: Small volume ascites, including small perihepatic, bilateral   paracolic gutters and in the deep pelvis. Diffuse peritoneal and omental   nodularity, compatible with carcinomatosis with references as follows:  In left upper quadrant inferior to the spleen measuring 7.4 x 2.1 cm   (abdomen 4:21).  Multiple tiny implants along the serosal surface of the liver with a   reference measuring1 cm (abdomen 4:27) at the inferior right lobe.  A horseshoe shaped deposit in the cul-de-sac measures 6.8 x 1.7 x 4 cm   (pelvis 5:16).  Multiple deposits in the bilateral adnexa.  Thick omental caking deep to the umbilicus.    VESSELS: Patent portal and hepatic veins.  RETROPERITONEUM/LYMPH NODES: No lymphadenopathy.  ABDOMINAL WALL: A small fat-containing right inguinal hernia.  BONES: Degenerative changes. A T10 vertebral body hemangioma.    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   Recommend gynecologic/oncologic consult and consider tissue sampling for   confirmation.    Cholelithiasis with discontinuity of the inflamed gallbladder wall   contiguous with a 4.3 cm multiloculated collection in the adjacent liver   in segment 5, most compatible with perforated cholecystitis with   intrahepatic abscess. Given additional MR findings highly suspicious for   malignancy, an underlying malignant/metastatic liver lesion cannot be   entirely excluded. Multiple serosal implants/tumor deposits along the   surface of the liver.    Choledocholithiasis resulting in biliary ductal dilatation.    Endometrium is distended to 1.4 cm with nonenhancing T1 hyperintense   material, favoring hemorrhagic debris versus less likely underlying   lesion.    --- End of Report ---  MARCO ANGLIN MD; Attending Radiologist  This document has been electronically signed. May  2 2024 11:54AM    < end of copied text >    < from: NM SPECT/CT Liver Scan, Single Area Single Day (24 @ 14:44) >  OTHER STUDIES USED FOR CORRELATION: CT chest abdomen pelvis 2024    FINDINGS: There is prompt, homogeneous uptake of radiopharmaceutical by   the hepatocytes. Activity is first seen in the bowel at about 10 minutes.   The gallbladder is not visualized during the first 60 minutes of imaging,   however appears 30 minutes after morphine administration. Gallbladder   filling is confirmed on correlative SPECT-CT images. There is no   radiotracer filling in the cystic liver collection adjacent to the   gallbladder as described on most recent CT.    There is good clearance of activity from the liver at the end of the   study.    IMPRESSION: Normal morphine-augmented hepatobiliary scan.    No evidence of acute cholecystitis or biliary obstruction.    < end of copied text >    < from: CT Angio Chest PE Protocol w/ IV Cont (24 @ 00:33) >  IMPRESSION:  Findings concerning for perforation of the fundal wall of the   gallbladder, communicating with a small amount of pericholecystic fluid,   and with a3.6 cm in greatest dimension complex cystic lesion in the   overlying hepatic parenchyma most likely an abscess.    The presence of gallstones and edematous wall thickening of the   gallbladder suggests that cholecystitis is the most likely cause forthis   perforation. Neoplasm less likely but possible.    Other smaller hepatic hypodensities may represent incidental cysts   although, given the probable carcinomatosis and right ovarian lesion   described below, metastases are also possible.    MRI abdomen with and without gadolinium and with MRCP would more   definitively image these gallbladder and hepatic abnormalities. Given the   patient's body habitus, ultrasound would probably not provide much more   information.    Several soft tissue density nodules in the omentum and peritoneal fat   most likely represents carcinomatosis. The most likely source is right   ovarian neoplasm; the right ovary is not identified separate from   numerous soft tissue and smaller calcific densities in theright adnexa   with surrounding ascitic fluid.    GYN protocol MRI pelvis with and without gadolinium would best evaluate   for right ovarian neoplasm.    Moderate volume simple density ascites.    No pulmonary embolism.    Small left and trace rightpleural effusions.    < end of copied text >

## 2024-05-06 NOTE — PROGRESS NOTE ADULT - PROBLEM SELECTOR PLAN 8
DVT PPx: Lovenox 40 mg qd  Diet: regular  Bowel Regimen: Miralax/Senna  Code: DNR/DNI w/ trial of intubation   Dispo: Pending Hospital Course Purse String (Intermediate) Text: Given the location of the defect and the characteristics of the surrounding skin a purse string intermediate closure was deemed most appropriate.  Undermining was performed circumfirentially around the surgical defect.  A purse string suture was then placed and tightened.

## 2024-05-06 NOTE — PROGRESS NOTE ADULT - PROBLEM SELECTOR PLAN 3
CT A/P suggestive of ovarian malignancy w/ carcinomatosis. 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   Elevated tumor marker: CEA 4.2, Ca 19-9 110, CA-125 801.     - f/u gyn onc recs -> outpatient biopsy CT A/P suggestive of ovarian malignancy w/ carcinomatosis. 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   Elevated tumor marker: CEA 4.2, Ca 19-9 110, CA-125 801.     - f/u gyn onc recs -> outpatient tissue biopsy

## 2024-05-06 NOTE — PROGRESS NOTE ADULT - PROBLEM SELECTOR PLAN 1
CT findings c/f cholelithiasis with perforation and possible abscess. HIDA scan negative. MRCP shows   Choledocholithiasis resulting in biliary ductal dilatation. Also w/ cholelithiasis with discontinuity of the inflamed gallbladder wall contiguous with a 4.3 cm multiloculated collection that is most compatible with perforated cholecystitis with intrahepatic abscess.   s/p perc dimple w/ IR 5/3. culture w/ moderate E coli and few enterococcus faecium    - monitor drain output   - continue zosyn (5/1-) CT findings c/f cholelithiasis with perforation and possible abscess. HIDA scan negative. MRCP shows   Choledocholithiasis resulting in biliary ductal dilatation. Also w/ cholelithiasis with discontinuity of the inflamed gallbladder wall contiguous with a 4.3 cm multiloculated collection that is most compatible with perforated cholecystitis with intrahepatic abscess.   s/p perc dimple w/ IR 5/3. bile fluid culture w/ moderate ESBL E coli and few enterococcus faecium.   s/p zosyn 5/1-5/5    - monitor drain output   - ID consulted, appreciate recs - abx regimen and duration  - continue meropenam (5/5 - ) for now pending further recs

## 2024-05-06 NOTE — PROGRESS NOTE ADULT - SUBJECTIVE AND OBJECTIVE BOX
Interventional Radiology Follow-Up Note    This is a 83y Female s/p percutaneous cholecystostomy tube placement on 5/3 in Interventional Radiology with Dr. Madden.     S: Patient seen and examined @ bedside. No complaints offered. Denies abdominal pain.     Medication:     azithromycin  IVPB: (05-06)  meropenem  IVPB: (05-06)  piperacillin/tazobactam IVPB..: (05-05)    Vitals:   T(F): 97.5, Max: 98.1 (20:45)  HR: 78  BP: 118/67  RR: 22  SpO2: 95%    Physical Exam:  General: Nontoxic, in NAD, A&O x3.  Abdomen: soft, NTND, no peritoneal signs.  Drain Device: Drain intact attached to bag with purulent bile Dressing clean, dry, intact. Drain flushed with 5cc NS w/o issues, +fluid return noted in tubing. No pericatheter leakage.     LABS:  WBC 11.26 / Hgb 9.7 / Hct 30.7 / Plt 266  Na 136 / K 4.1 / CO2 28 / Cl 97 / BUN 15 / Cr 0.56 / Glucose 158  ALT 19 / AST 18 / Alk Phos 87 / Tbili 0.3  Ptt -- / Pt -- / INR --      24hr Drain output: 50cc    Assessment/Plan: 83 female history of COPD not on O2, obesity, HTN, CHF, BCC, hypothyroidism, psoriatic arthritis SUZIE BIBEMS for acute onset SOB. 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 Moderate volume ascites. Trace left pleural effusion. Patient is currently s/p percutaneous cholecystostomy tube placement on 5/3 in Interventional Radiology with Dr. Madden.       -VSS  -wbc slightly up from yesterday 10.45-->11.26: continue to trend  -Choledocholithiasis pending ERCP this week  -trend vs/labs  -flush drain with 5cc NS daily forward only; DO NOT aspirate  -change dressing q3 days or when dressing is saturated  -regarding outpatient follow up with IR, if the patient is d/c home with drainage catheter they can make an appointment with IR by calling the IR booking office at [phone]; recommend IR follow in 8wks for tube evaluation/ exchange.  -they will benefit from VNS service to help with drainage catheter care; they should continue same drainage catheter care as an outpatient.  -continue global management per primary team   -Please call IR at extension [phone] with any questions, concerns, or issues regarding above.      Teresita Weiner PA-C  Available on teams

## 2024-05-06 NOTE — DISCHARGE NOTE PROVIDER - CARE PROVIDER_API CALL
Wilmer Madden  Interventional Radiology and Diagnostic Radiology  [address] [place] [address]  Phone: [phone]  Fax: [phone]  Follow Up Time: 2 months   Wilmer Madden  Interventional Radiology and Diagnostic Radiology  [address] [place] [address]  Phone: [phone]  Fax: [phone]  Follow Up Time: 2 months    Lisker, Gita Naomi  Critical Care Medicine  [address] [place] [address], [facility] [address]  [place] [address]  Phone: [phone]  Fax: [phone]  Follow Up Time:    Wilmer Madden  Interventional Radiology and Diagnostic Radiology  [address] [place] [address]  Phone: [phone]  Fax: [phone]  Follow Up Time: 2 months    Lisker, Gita Naomi  Critical Care Medicine  [address] [facility], [address]  [place] [address]  Phone: [phone]  Fax: [phone]  Follow Up Time:     Vincent Skinner  Infectious Disease  [address] [place] [address]  Phone: [phone]  Fax: [phone]  Follow Up Time: Routine

## 2024-05-06 NOTE — DISCHARGE NOTE PROVIDER - NSDCCPTREATMENT_GEN_ALL_CORE_FT
PRINCIPAL PROCEDURE  Procedure: Percutaneous cholecystostomy  Findings and Treatment: You had a Drain placed your gallbladder by Dr. Madden on 5/3/274 in Intervetnional Radiology (IR).   Monitor site for any sign or symptoms of infection (painful red skin, green/ yellow foul smelling discharge from the insertion site, fever, chills, leakage around drain site).   Flush drain with 5mL NS daily. If you meet resistance upon flushing, STOP and contact IR.   Empty drainage bag or bulb daily and record output. If there is a sudden decrease in output please contact IR to schedule  an appointment.  Drain care:  -Disconnect tubing (tube attached to bag/ bulb)  from the catheter (catheter going into skin)  -Clean catheter with alcohol swab  -Twist on the flush syringe to the catheter (be sure to push the air out of syringe)  -Flush catheter with 5mL NS (DO NOT pull back on syringe). If you meet resistance upon flushing, STOP and contact IR.   -Disconnect syringe from catheter and reconnect drainage bag or bulb.  Dressing care:  -Wash hands thoroughly with water and soap for at least 20 seconds.   -take off old dressing and clean around drain site with gauze soaked with warm water  -After cleaning the site, dry and replace dressing with a new gauze and tegaderm.   -Place one piece of gauze underneath the drain and another piece of gauze on top of drain.   -Apply tegaderm (clear dressing) on top.  -Change dressing every 3 days or when soiled     PRINCIPAL PROCEDURE  Procedure: Percutaneous cholecystostomy  Findings and Treatment: You had a Drain placed your gallbladder by Dr. Madden on 5/3/274 in Intervetnional Radiology (IR).   Monitor site for any sign or symptoms of infection (painful red skin, green/ yellow foul smelling discharge from the insertion site, fever, chills, leakage around drain site).   Flush drain with 5mL NS daily. If you meet resistance upon flushing, STOP and contact IR.   Empty drainage bag or bulb daily and record output. If there is a sudden decrease in output please contact IR to schedule  an appointment.  Drain care:  -Disconnect tubing (tube attached to bag/ bulb)  from the catheter (catheter going into skin)  -Clean catheter with alcohol swab  -Twist on the flush syringe to the catheter (be sure to push the air out of syringe)  -Flush catheter with 5mL NS (DO NOT pull back on syringe). If you meet resistance upon flushing, STOP and contact IR.   -Disconnect syringe from catheter and reconnect drainage bag or bulb.  Dressing care:  -Wash hands thoroughly with water and soap for at least 20 seconds.   -take off old dressing and clean around drain site with gauze soaked with warm water  -After cleaning the site, dry and replace dressing with a new gauze and tegaderm.   -Place one piece of gauze underneath the drain and another piece of gauze on top of drain.   -Apply tegaderm (clear dressing) on top.  -Change dressing every 3 days or when soiled      SECONDARY PROCEDURE  Procedure: MRI abdomen  Findings and Treatment: A 5.3 cm LEFT ovarian/adnexal mass with multiple omental and peritoneal deposits throughout the abdomen and pelvis, highly suspicious for primary gynecologic malignancy with peritoneal and omental carcinomatosis. Recommend gynecologic/oncologic consult and consider tissue sampling for confirmation.  Cholelithiasis with discontinuity of the inflamed gallbladder wall   contiguous with a 4.3 cm multiloculated collection in the adjacent liver   in segment 5, most compatible with perforated cholecystitis with   intrahepatic abscess. Given additional MR findings highly suspicious for   malignancy, an underlying malignant/metastatic liver lesion cannot be   entirely excluded. Multiple serosal implants/tumor deposits along the   surface of the liver.  Choledocholithiasis resulting in biliary ductal dilatation.  Endometrium is distended to 1.4 cm with nonenhancing T1 hyperintense material, favoring hemorrhagic debris versus less likely underlying lesion.

## 2024-05-06 NOTE — PROGRESS NOTE ADULT - PROBLEM SELECTOR PLAN 4
Met sepsis criteria w/ leukocytosis, tachypnea, fever and tachycardia. Likely secondary to perforated gallbladder seen on CT scan. MRCP findings suggestive of perforated cholecystitis with intrahepatic abscess. U/A, RVP negative. CTA neg for PE. Blood and urine cultures negative.   Improved.     - continue w/ zosyn   - scheduled for perc dimple w/ drain today   - trend WBC and fever curve MRCP showed choledocholithiasis resulting in biliary ductal dilatation. Tbili, LFTs wnl.     - f/u GI recs -> tentative ERCP 5/8 or 5/9

## 2024-05-06 NOTE — PROGRESS NOTE ADULT - PROBLEM SELECTOR PLAN 7
DVT PPx: Lovenox 40 mg qd  Diet: regular  Bowel Regimen: Miralax/Senna  Code: DNR/DNI w/ trial of intubation   Dispo: Pending Hospital Course normal... -hold home sulfasalazine pending further imaging

## 2024-05-06 NOTE — DISCHARGE NOTE PROVIDER - PROVIDER TOKENS
PROVIDER:[TOKEN:[[id number]],FOLLOWUP:[2 months]] PROVIDER:[TOKEN:[[id number]],FOLLOWUP:[2 months]],PROVIDER:[TOKEN:[[id number]]] PROVIDER:[TOKEN:[[id number]],FOLLOWUP:[2 months]],PROVIDER:[TOKEN:[[id number]]],PROVIDER:[TOKEN:[[id number]],FOLLOWUP:[Routine]]

## 2024-05-06 NOTE — PROGRESS NOTE ADULT - PROBLEM SELECTOR PLAN 2
Pt previously treated at [facility] for pneumonia. Also w/ hx of COPD not on any medications or home medications. Suspect AHRF 2/2 to COPD exacerbation.  Improving    - duonebs  - budesonide  - started prednisone 40mg qd (5/4- )  - continue azithromycin (5/4- )   - wean oxygen as tolerated  - CPAP at night for SUZIE Pt previously treated at [facility] for pneumonia. Also w/ hx of COPD not on any medications or home medications. Suspect AHRF 2/2 to COPD exacerbation.  Improving    - duonebs  - budesonide  - started prednisone 40mg qd (5/4- ) for 5 days  - continue azithromycin (5/4- 5/6) x 3 days  - wean oxygen as tolerated  - CPAP at night for SUZIE

## 2024-05-06 NOTE — DISCHARGE NOTE PROVIDER - NSFOLLOWUPCLINICS_GEN_ALL_ED_FT
[facility] Gastroenterology  Gastroenterology  [address] [place] [id number]  Phone: [phone]  Fax:   Follow Up Time: Routine

## 2024-05-06 NOTE — PHARMACOTHERAPY INTERVENTION NOTE - COMMENTS
GAYLE Whittaker is a 83y F w perforated gallbladder, liver lesion, probably abscess related to adjacent perforated GB.      Bile fluid culture from 5/3 grew ESBL E. coli and amp S E. faecium.  Spoke to micro lab and requested release of penicillin and imipenem MICs but both were high at 8.  In discussion with ID, recommended ampicillin + ertapenem for now and could transition to amoxicillin + ertapenem for easier regimen.     Thank you,  Patience Waddell, PharmD, BCIDP  Clinical Pharmacist, Infectious Diseases  Available via Teams   Cell: [phone]

## 2024-05-06 NOTE — DISCHARGE NOTE PROVIDER - CARE PROVIDERS DIRECT ADDRESSES
,DirectAddress_Unknown ,DirectAddress_Unknown,[email or web address] ,DirectAddress_Unknown,[email or web address]

## 2024-05-06 NOTE — DISCHARGE NOTE PROVIDER - HOSPITAL COURSE
HPI:  83 female history of COPD not on O2, morbid obesity, HTN, CHF, BCC, hypothyroidism, psoriatic arthritis, SUZIE, currently on abx for presumed PNA, BIBEMS for acute onset SOB.      Pt admitted from [facility] where she is long term resident, and she has had increased work of breathing for 1 day.  Per ED report, patient was diagnosed recently with left-sided pneumonia, receiving IV zosyn and IVF at the facility.  She reports weakness along with emesis around 5 days ago improved though she still reports poor appetite. She also reports right-sided abdominal pain and a dry cough which she suspects is related to allergies. Denies CP, sore throat, nausea, urinary/bowel changes except constipation, numbness/tingling, leg swelling.     In the ED, patient found to be febrile to 38.4, HR 94, maintained on NC, given CTX, APAP, Nebs, with urine and blood cultures sent.  (01 May 2024 04:54)    Hospital Course:  Patient was admitted for AHRF 2/2 to URI vs COPD exacerbation and sepsis 2/2 to perforated gallbladder. Also found to have new     #perforated gallbladder #choledocholithiasis     #New left ovarian mass with mets     #AHRF #COPD exacerbation       On day of discharge, patient is clinically stable with no new exam findings or acute symptoms compared to prior. The patient was seen by the attending physician on the date of discharge and deemed stable and acceptable for discharge. The patient's chronic medical conditions were treated accordingly per the patient's home medication regimen. The patient's medication reconciliation, follow up appointments, discharge instructions, and significant lab and diagnostic studies are as noted.     Important Medication Changes and Reason:  -   -     Active or Pending Issues Requiring Follow-up:  - f/u with PCP in 1-2 weeks  - f/u with IR for drain and tissue biopsy  - f/u with gyn onc   - f/u with     Advanced Directives:   [ ] Full code  [X] DNR  [ ] Hospice    Discharge Diagnoses:         HPI:  83 female history of COPD not on O2, morbid obesity, HTN, CHF, BCC, hypothyroidism, psoriatic arthritis, SUZIE, currently on abx for presumed PNA, BIBEMS for acute onset SOB.      Pt admitted from [facility] where she is long term resident, and she has had increased work of breathing for 1 day.  Per ED report, patient was diagnosed recently with left-sided pneumonia, receiving IV zosyn and IVF at the facility.  She reports weakness along with emesis around 5 days ago improved though she still reports poor appetite. She also reports right-sided abdominal pain and a dry cough which she suspects is related to allergies. Denies CP, sore throat, nausea, urinary/bowel changes except constipation, numbness/tingling, leg swelling.     In the ED, patient found to be febrile to 38.4, HR 94, maintained on NC, given CTX, APAP, Nebs, with urine and blood cultures sent.  (01 May 2024 04:54)    Hospital Course:  Patient was admitted for AHRF 2/2 to URI vs COPD exacerbation and sepsis 2/2 to perforated gallbladder. Also found to have likely new metastatic ovarian cancer.     #Perforated gallbladder #Choledocholithiasis   On imaging patient found to have cholelithiasis with discontinuity of the inflamed gallbladder wall contiguous with a 4.3 cm multiloculated collection in the adjacent liver, most compatible with perforated cholecystitis with intrahepatic abscess. Patient underwent a percutaneous cholecystostomy with IR on 5/3. Bile culture grew ESBL E coli and few enterococcus faecium. ID was consulted, recommended to continue ertapenam and ampicillin for a 6 week course (until 6/17).     MRCP also showed choledocholithiasis resulting in biliary ductal dilatation. GI was consulted, recommend outpatient ERCP.     #New left ovarian mass with mets  On imaging, found to have a 5.3 cm left ovarian/adnexal mass with multiple omental and peritoneal deposits throughout the abdomen and pelvis, highly suspicious for primary   gynecologic malignancy with peritoneal and omental carcinomatosis. Gyn onc and IR consulted. Patient will need outpatient tissue biopsy.     #AHRF #COPD exacerbation   Patient initially required 4L of NC on admission w/ wheezing on exam. She received 3 days of azithromycin and prednisone for treatment of COPD exacerbation. Her breathing is now improved and now on room air.     On day of discharge, patient is clinically stable with no new exam findings or acute symptoms compared to prior. The patient was seen by the attending physician on the date of discharge and deemed stable and acceptable for discharge. The patient's chronic medical conditions were treated accordingly per the patient's home medication regimen. The patient's medication reconciliation, follow up appointments, discharge instructions, and significant lab and diagnostic studies are as noted.     Important Medication Changes and Reason:  - continue ertapenem 1g qd until 6/17  - continue ampicillin 2g q6h until 6/17  - duonebs as needed for wheezing     Active or Pending Issues Requiring Follow-up:  - f/u with PCP in 1-2 weeks  - f/u with IR for drain and tissue biopsy  - f/u with gyn onc   - f/u with     Advanced Directives:   [ ] Full code  [X] DNR  [ ] Hospice    Discharge Diagnoses:

## 2024-05-06 NOTE — DISCHARGE NOTE PROVIDER - NSDCCPCAREPLAN_GEN_ALL_CORE_FT
PRINCIPAL DISCHARGE DIAGNOSIS  Diagnosis: Perforated gallbladder  Assessment and Plan of Treatment:       SECONDARY DISCHARGE DIAGNOSES  Diagnosis: Ovarian mass, left  Assessment and Plan of Treatment:     Diagnosis: COPD exacerbation  Assessment and Plan of Treatment:      PRINCIPAL DISCHARGE DIAGNOSIS  Diagnosis: Perforated gallbladder  Assessment and Plan of Treatment: You were found to have a gallbladder rupture. You underwent a percutaneous cholecystostomy with the interventional radiologist. You now have a drain placed. The drain instructions are included below:   Monitor site for any sign or symptoms of infection (painful red skin, green/ yellow foul smelling discharge from the insertion site, fever, chills, leakage around drain site).   Flush drain with 5mL NS daily. If you meet resistance upon flushing, STOP and contact IR.   Empty drainage bag or bulb daily and record output. If there is a sudden decrease in output please contact IR to schedule  an appointment.  Drain care:  -Disconnect tubing (tube attached to bag/ bulb)  from the catheter (catheter going into skin)  -Clean catheter with alcohol swab  -Twist on the flush syringe to the catheter (be sure to push the air out of syringe)  -Flush catheter with 5mL NS (DO NOT pull back on syringe). If you meet resistance upon flushing, STOP and contact IR.   -Disconnect syringe from catheter and reconnect drainage bag or bulb.  Dressing care:  -Wash hands thoroughly with water and soap for at least 20 seconds.   -take off old dressing and clean around drain site with gauze soaked with warm water  -After cleaning the site, dry and replace dressing with a new gauze and tegaderm.   -Place one piece of gauze underneath the drain and another piece of gauze on top of drain.   -Apply tegaderm (clear dressing) on top.  -Change dressing every 3 days or when soiled  You will continue IV antibiotics (ertapenem and ampicillin) for 6 weeks until 6/17. Please follow up with the GI doctors in 2 weeks to schedule ERCP. It is important that you follow up with the interventional radiologist and infectious disease doctors. If you develop any fever, chills, severe abdominal pain, nausea or vomiting, please seek medical attention.      SECONDARY DISCHARGE DIAGNOSES  Diagnosis: Ovarian mass, left  Assessment and Plan of Treatment: On imaging, you were found to have a 5.3 cm left ovarian mass with possible metasteses. The gynocology/oncology team were consulted. Please call the IR booking office at  to schedule an outpatient telehealth visit for discussions on the the next steps for tissue biopsy.    Diagnosis: COPD exacerbation  Assessment and Plan of Treatment: During your hospital, you had shortness of breath, wheezing and required oxygen support. You were treated for a COPD exacerbation with antibiotics and steroids. Your breathing has now improved and back on room air. Please follow up with your pulmonologist Dr. Mckeon in 1-2 weeks. If you develop any fever, chills, chest pain or shortness of breath, please seek medical attention.

## 2024-05-06 NOTE — PROGRESS NOTE ADULT - ASSESSMENT
83 female history of COPD not on O2, obesity, HTN, CHF, BCC, hypothyroidism, psoriatic arthritis SUZIE BIBEMS for acute onset SOB, admitted for sepsis secondary to perforated gallbladder. Also found to have complex cystic liver lesion and new left ovarian neoplasm w/ carcinomatosis. Hospital course complicated w/ AHRF likely 2/2 to COPD exacerbation.  83 female history of COPD not on O2, obesity, HTN, CHF, BCC, hypothyroidism, psoriatic arthritis SUZIE BIBEMS for acute onset SOB, admitted for sepsis secondary to perforated gallbladder s/p perc dimple 5/3. Also found to have complex cystic liver lesion and new left ovarian neoplasm w/ carcinomatosi, pending outpatient IR tissue biopsy. Hospital course complicated w/ AHRF likely 2/2 to COPD exacerbation.

## 2024-05-06 NOTE — PROGRESS NOTE ADULT - PROBLEM SELECTOR PLAN 6
-hold home sulfasalazine pending further imaging -Defer given possible infection, restart home meds as tolerated

## 2024-05-06 NOTE — DISCHARGE NOTE PROVIDER - NSDCFUADDAPPT_GEN_ALL_CORE_FT
Please schedule an appointment with Interventional Radiology in 6weeks for evaluation and exchange of your gallbladder drain. You may call the IR booking office @ [phone] to schedule. Please feel free to contact us at [phone] if any problems arise. After 6PM, Monday through Friday, on weekends and on holidays, please call [phone] and ask for the radiology resident on call to be paged.  Please schedule an appointment with Interventional Radiology in 6weeks for evaluation and exchange of your gallbladder drain. You may call the IR booking office @ [phone] to schedule. Please feel free to contact us at [phone] if any problems arise. After 6PM, Monday through Friday, on weekends and on holidays, please call [phone] and ask for the radiology resident on call to be paged.     APPTS ARE READY TO BE MADE: [X] YES    Best Family or Patient Contact (if needed):    Additional Information about above appointments (if needed):    1: Pulmonary HOME program  2:   3:     Other comments or requests:    Interventional Radiology follow up: Please schedule an appointment with Interventional Radiology in 8 weeks for evaluation and exchange of your gallbladder drain. You may call the IR booking office @ [phone] to schedule. Please feel free to contact us at [phone] if any problems arise. After 6PM, Monday through Friday, on weekends and on holidays, please call [phone] and ask for the radiology resident on call to be paged.     APPTS ARE READY TO BE MADE: [X] YES    Best Family or Patient Contact (if needed):    Additional Information about above appointments (if needed):    1: Pulmonary HOME program  2:   3:     Other comments or requests:    Interventional Radiology follow up: Please schedule an appointment with Interventional Radiology in 8 weeks for evaluation and exchange of your gallbladder drain. You may call the IR booking office @ [phone] to schedule. Please feel free to contact us at [phone] if any problems arise. After 6PM, Monday through Friday, on weekends and on holidays, please call [phone] and ask for the radiology resident on call to be paged.     APPTS ARE READY TO BE MADE: [X] YES    Best Family or Patient Contact (if needed):    Additional Information about above appointments (if needed):    1: Pulmonary HOME program  2:   3:     Other comments or requests:     Patient advised they did not want to proceed with scheduling appointments with the providers on their referrals. They will coordinate care on their own.    Interventional Radiology follow up: Please schedule an appointment with Interventional Radiology in 8 weeks for evaluation and exchange of your gallbladder drain. You may call the IR booking office @ [phone] to schedule. Please feel free to contact us at [phone] if any problems arise. After 6PM, Monday through Friday, on weekends and on holidays, please call [phone] and ask for the radiology resident on call to be paged.     APPTS ARE READY TO BE MADE: [X] YES    Best Family or Patient Contact (if needed):    Additional Information about above appointments (if needed):    1: f/u with GI in 2 weeks   2: f/u with IR  3: f/u with ID   4: Pulmonary HOME program    Other comments or requests:     Patient advised they did not want to proceed with scheduling appointments with the providers on their referrals. They will coordinate care on their own.

## 2024-05-06 NOTE — PROGRESS NOTE ADULT - ASSESSMENT
Impression:   83 female history of COPD not on O2, obesity, HTN, CHF, BCC, hypothyroidism, psoriatic arthritis SUZIE BIBEMS for acute onset SOB and found to have sepsis secondary to perforated gallbladder with MRI/MRCP noting a 5.3 cm LEFT ovarian/adnexal mass with multiple omental and peritoneal deposits throughout the abdomen and pelvis, highly suspicious for primary gynecologic malignancy with peritoneal and omental carcinomatosis; cholelithiasis with discontinuity of the inflamed gallbladder wall contiguous with a 4.3 cm multiloculated collection in the adjacent liver in segment 5, most compatible with perforated cholecystitis with intrahepatic abscess and Choledocholithiasis resulting in biliary ductal dilatation prompting GI consult.     #Perforated gallbladder with 4.3 cm multiloculated collection in the adjacent liver in segment 5  - s/p IR drainage - fluid was positive for ESBL, currently on meropenem. Blood Cultures NGTD.     #CBD dilation w/ choledocholithiasis  Will need ERCP later this week when she is medically optimized for the procedure.     #LEFT ovarian/adnexal mass with multiple omental and peritoneal deposits throughout the abdomen and pelvis, highly suspicious for primary   gynecologic malignancy with peritoneal and omental carcinomatosis - planning for o/p biopsy.     #Acute hypoxic respiratory failure 2/2 COPD  - currently on steroids started on 5/4 - oxygen requirement is improving.     Recommendations:  - will plan for potential ERCP on 5/8 or 5/9 depending on her clinical status.   - Continue with abx, appreciate ID recs.   - CBC and CMP daily.     will need to discuss the case with attending.     GI will continue to follow.     All recommendations are tentative until note is attested by an attending.     Ale French, PGY-5  Gastroenterology/Hepatology Fellow  Available on [employer] Teams  [phone] (Short Range Pager)  [phone] (Long Range Pager)    After 5pm, please contact the on-call GI fellow. Impression:   83 female history of COPD not on O2, obesity, HTN, CHF, BCC, hypothyroidism, psoriatic arthritis SUZIE BIBEMS for acute onset SOB and found to have sepsis secondary to perforated gallbladder with MRI/MRCP noting a 5.3 cm LEFT ovarian/adnexal mass with multiple omental and peritoneal deposits throughout the abdomen and pelvis, highly suspicious for primary gynecologic malignancy with peritoneal and omental carcinomatosis; cholelithiasis with discontinuity of the inflamed gallbladder wall contiguous with a 4.3 cm multiloculated collection in the adjacent liver in segment 5, most compatible with perforated cholecystitis with intrahepatic abscess and Choledocholithiasis resulting in biliary ductal dilatation prompting GI consult.     #Perforated gallbladder with 4.3 cm multiloculated collection in the adjacent liver in segment 5  - s/p IR drainage - fluid was positive for ESBL, currently on meropenem. Blood Cultures NGTD.     #CBD dilation w/ choledocholithiasis  Will need ERCP later this week when she is medically optimized for the procedure.     #LEFT ovarian/adnexal mass with multiple omental and peritoneal deposits throughout the abdomen and pelvis, highly suspicious for primary   gynecologic malignancy with peritoneal and omental carcinomatosis - planning for o/p biopsy.     #Acute hypoxic respiratory failure 2/2 COPD  - currently on steroids started on 5/4 - oxygen requirement is improving.     Recommendations:  - will plan for potential ERCP on 5/8 or 5/9 depending on her clinical status.   - Continue with abx, appreciate ID recs.   - CBC and CMP daily.     Discussed the case with Dr. Cotter.     GI will continue to follow.     All recommendations are tentative until note is attested by an attending.     Ale French, PGY-5  Gastroenterology/Hepatology Fellow  Available on [employer] Teams  [phone] (Short Range Pager)  [phone] (Long Range Pager)    After 5pm, please contact the on-call GI fellow.

## 2024-05-06 NOTE — PROGRESS NOTE ADULT - SUBJECTIVE AND OBJECTIVE BOX
*******************************  Alejandra Mota MD (PGY-1)  Internal Medicine  Contact via [employer] TEAMS  *******************************    JUSTIN KEYES  83y  Female    Patient is a 83y old  Female who presents with a chief complaint of Sepsis, perforated gallbladder, sob, poss metastatic Ovarian CA (05 May 2024 07:26)      Subjective:    Objective:  T(C): 36.4 (05-06-24 @ 05:49), Max: 37.1 (05-05-24 @ 11:14)  HR: 71 (05-06-24 @ 06:17) (62 - 87)  BP: 118/67 (05-06-24 @ 05:49) (118/67 - 138/89)  RR: 22 (05-06-24 @ 05:49) (21 - 23)  SpO2: 97% (05-06-24 @ 06:17) (92% - 97%)  I&O's Summary    05 May 2024 07:01  -  06 May 2024 07:00  --------------------------------------------------------  IN: 0 mL / OUT: 450 mL / NET: -450 mL        PHYSICAL EXAM:  GENERAL: NAD  HEAD:  Atraumatic, Normocephalic  EYES: EOMI, PERRLA, conjunctiva and sclera clear  ENMT: Moist mucous membranes  NECK: Supple, No JVD, trachea midline   NERVOUS SYSTEM:  Alert & Oriented X3, Good concentration; Motor Strength 5/5 B/L upper and lower extremities; DTRs 2+ intact and symmetric  CHEST/LUNG: Clear to auscultation bilaterally; No rales, rhonchi, wheezing, or rubs  HEART: Regular rate and rhythm; No murmurs, rubs, or gallops  ABDOMEN: Soft, Nontender, Nondistended; Bowel sounds present  EXTREMITIES:  2+ Peripheral Pulses, No clubbing, cyanosis, or edema  LYMPH: No lymphadenopathy noted  SKIN: No rashes or lesions    MEDICATIONS  (STANDING):  albuterol/ipratropium for Nebulization 3 milliLiter(s) Nebulizer every 6 hours  azithromycin  IVPB 500 milliGRAM(s) IV Intermittent every 24 hours  buDESOnide    Inhalation Suspension 0.25 milliGRAM(s) Inhalation two times a day  chlorhexidine 4% Liquid 1 Application(s) Topical daily  gabapentin 800 milliGRAM(s) Oral every 12 hours  levothyroxine 50 MICROGram(s) Oral daily  loratadine 10 milliGRAM(s) Oral daily  meropenem  IVPB 1000 milliGRAM(s) IV Intermittent every 8 hours  mupirocin 2% Nasal 1 Application(s) Both Nostrils two times a day  nystatin Powder 1 Application(s) Topical three times a day  polyethylene glycol 3350 17 Gram(s) Oral daily  predniSONE   Tablet 40 milliGRAM(s) Oral daily  senna 2 Tablet(s) Oral at bedtime    MEDICATIONS  (PRN):  acetaminophen     Tablet .. 650 milliGRAM(s) Oral every 6 hours PRN Temp greater or equal to 38C (100.4F), Mild Pain (1 - 3)  oxyCODONE    IR 5 milliGRAM(s) Oral every 4 hours PRN Moderate Pain (4 - 6)      LABS:        CAPILLARY BLOOD GLUCOSE          RADIOLOGY & ADDITIONAL TESTS:               *******************************  Alejandra Mota MD (PGY-1)  Internal Medicine  Contact via [employer] TEAMS  *******************************    JUSTIN KEYES  83y  Female    Patient is a 83y old  Female who presents with a chief complaint of Sepsis, perforated gallbladder, sob, poss metastatic Ovarian CA (05 May 2024 07:26)    Subjective: No acute events overnight. Seen at bedside this morning. Noted improvement in breathing. Denied any fever, chills, chest pain, sob, ab pain, n/v.     Objective:  T(C): 36.4 (05-06-24 @ 05:49), Max: 37.1 (05-05-24 @ 11:14)  HR: 71 (05-06-24 @ 06:17) (62 - 87)  BP: 118/67 (05-06-24 @ 05:49) (118/67 - 138/89)  RR: 22 (05-06-24 @ 05:49) (21 - 23)  SpO2: 97% (05-06-24 @ 06:17) (92% - 97%)  I&O's Summary    05 May 2024 07:01  -  06 May 2024 07:00  --------------------------------------------------------  IN: 0 mL / OUT: 450 mL / NET: -450 mL    PHYSICAL EXAM:  GENERAL: NAD, obese   HEAD:  Atraumatic, Normocephalic  EYES: EOMI, PERRLA, conjunctiva and sclera clear  ENMT: Moist mucous membranes  NECK: Supple, No JVD, trachea midline   NERVOUS SYSTEM:  Alert & Oriented X3, Good concentration  CHEST/LUNG: Clear to auscultation bilaterally; No rales, rhonchi, wheezing, or rubs  HEART: Regular rate and rhythm; No murmurs, rubs, or gallops  ABDOMEN: Soft, Nontender, Nondistended; drain in place w/ light brown output, Bowel sounds present  EXTREMITIES:  2+ Peripheral Pulses, No clubbing, cyanosis, or edema  SKIN: No rashes or lesions    MEDICATIONS  (STANDING):  albuterol/ipratropium for Nebulization 3 milliLiter(s) Nebulizer every 6 hours  azithromycin  IVPB 500 milliGRAM(s) IV Intermittent every 24 hours  buDESOnide    Inhalation Suspension 0.25 milliGRAM(s) Inhalation two times a day  chlorhexidine 4% Liquid 1 Application(s) Topical daily  gabapentin 800 milliGRAM(s) Oral every 12 hours  levothyroxine 50 MICROGram(s) Oral daily  loratadine 10 milliGRAM(s) Oral daily  meropenem  IVPB 1000 milliGRAM(s) IV Intermittent every 8 hours  mupirocin 2% Nasal 1 Application(s) Both Nostrils two times a day  nystatin Powder 1 Application(s) Topical three times a day  polyethylene glycol 3350 17 Gram(s) Oral daily  predniSONE   Tablet 40 milliGRAM(s) Oral daily  senna 2 Tablet(s) Oral at bedtime    MEDICATIONS  (PRN):  acetaminophen     Tablet .. 650 milliGRAM(s) Oral every 6 hours PRN Temp greater or equal to 38C (100.4F), Mild Pain (1 - 3)  oxyCODONE    IR 5 milliGRAM(s) Oral every 4 hours PRN Moderate Pain (4 - 6)    LABS:             9.7    11.26 )-----------( 266      ( 06 May 2024 06:37 )             30.7     05-06    136  |  97  |  15  ----------------------------<  158<H>  4.1   |  28  |  0.56    Ca    8.7      06 May 2024 06:37  Phos  2.8     05-06  Mg     2.0     05-06    TPro  6.4  /  Alb  2.9<L>  /  TBili  0.3  /  DBili  x   /  AST  18  /  ALT  19  /  AlkPhos  87  05-06    Culture Results:   Moderate Escherichia coli ESBL  Few Enterococcus faecium (05-03 @ 17:11)  Culture Results:   No growth at 72 Hours (05-03 @ 01:08)  Culture Results:   No growth at 72 Hours (05-03 @ 01:08)  Culture Results:   <10,000 CFU/mL Normal Urogenital Ijeoma (04-30 @ 23:24)  Culture Results:   No growth at 5 days (04-30 @ 21:17)  Culture Results:   No growth at 5 days (04-30 @ 21:00)    RADIOLOGY & ADDITIONAL TESTS:

## 2024-05-06 NOTE — PROGRESS NOTE ADULT - SUBJECTIVE AND OBJECTIVE BOX
Gastroenterology/Hepatology Progress Note      Interval Events:     - Patient feels better overall, no acute events overnight.   - Abd pain has resolved now, no nausea or vomiting. Tolerating po diet well.   - Currently on 1L NC s/p steroids, still has cough but decreased.   - No fevers or chills.   - Still has IR drain in place - 50 cc in the last 12 hours, brown liquid.     Allergies:  Milk (Rash)  latex (Rash)  erythromycin (Rash)  phenobarbital (Rash)      Hospital Medications:  acetaminophen     Tablet .. 650 milliGRAM(s) Oral every 6 hours PRN  albuterol/ipratropium for Nebulization 3 milliLiter(s) Nebulizer every 6 hours  azithromycin  IVPB 500 milliGRAM(s) IV Intermittent every 24 hours  buDESOnide    Inhalation Suspension 0.25 milliGRAM(s) Inhalation two times a day  chlorhexidine 4% Liquid 1 Application(s) Topical daily  gabapentin 800 milliGRAM(s) Oral every 12 hours  levothyroxine 50 MICROGram(s) Oral daily  loratadine 10 milliGRAM(s) Oral daily  meropenem  IVPB 1000 milliGRAM(s) IV Intermittent every 8 hours  mupirocin 2% Nasal 1 Application(s) Both Nostrils two times a day  nystatin Powder 1 Application(s) Topical three times a day  oxyCODONE    IR 5 milliGRAM(s) Oral every 4 hours PRN  polyethylene glycol 3350 17 Gram(s) Oral daily  predniSONE   Tablet 40 milliGRAM(s) Oral daily  senna 2 Tablet(s) Oral at bedtime      ROS: 14 point ROS negative unless otherwise state in subjective    PHYSICAL EXAM:   Vital Signs:  Vital Signs Last 24 Hrs  T(C): 36.4 (06 May 2024 05:49), Max: 36.7 (05 May 2024 20:45)  T(F): 97.5 (06 May 2024 05:49), Max: 98.1 (05 May 2024 20:45)  HR: 78 (06 May 2024 08:54) (62 - 87)  BP: 118/67 (06 May 2024 05:49) (118/67 - 138/89)  BP(mean): --  RR: 22 (06 May 2024 05:49) (22 - 23)  SpO2: 95% (06 May 2024 08:54) (92% - 97%)    Parameters below as of 06 May 2024 05:49  Patient On (Oxygen Delivery Method): nasal cannula      Daily     Daily     GENERAL:  No acute distress, obese female, lying in bed.   HEENT:  NCAT, no scleral icterus, has NC in place.   CHEST: no resp distress  HEART:  RRR  ABDOMEN:  Soft, non-tender, obese abd, moderately distended, has external drain in the RUQ,   EXTREMITIES:  No LE edema b/l  SKIN:  No rash/erythema/ecchymoses/petechiae/wounds/abscess/warm/dry  NEURO:  Alert and oriented x 3,  no tremor    LABS:                        9.7    11.26 )-----------( 266      ( 06 May 2024 06:37 )             30.7     Mean Cell Volume: 90.3 fl (05-06-24 @ 06:37)    05-06    136  |  97  |  15  ----------------------------<  158<H>  4.1   |  28  |  0.56    Ca    8.7      06 May 2024 06:37  Phos  2.8     05-06  Mg     2.0     05-06    TPro  6.4  /  Alb  2.9<L>  /  TBili  0.3  /  DBili  x   /  AST  18  /  ALT  19  /  AlkPhos  87  05-06    LIVER FUNCTIONS - ( 06 May 2024 06:37 )  Alb: 2.9 g/dL / Pro: 6.4 g/dL / ALK PHOS: 87 U/L / ALT: 19 U/L / AST: 18 U/L / GGT: x             Urinalysis Basic - ( 06 May 2024 06:37 )    Color: x / Appearance: x / SG: x / pH: x  Gluc: 158 mg/dL / Ketone: x  / Bili: x / Urobili: x   Blood: x / Protein: x / Nitrite: x   Leuk Esterase: x / RBC: x / WBC x   Sq Epi: x / Non Sq Epi: x / Bacteria: x            Imaging:      MRCP on 5/1    FINDINGS:  LOWER CHEST: Small left and trace right pleural effusions with adjacent   passive atelectasis in the left lower lobe, as on earlier same day CT.    LIVER/GALLBLADDER: Cholelithiasis in a nondistended, but inflamed   gallbladder, with gallbladder wall thickening and irregularity, most   compatible with gangrenous/perforated cholecystitis. Evidence of   perforated cholecystitis with discontinuity of the gallbladder wall at   the liver surface contiguous with a 4.3 x 2.7 x 4.2 cm multiloculated   fluid collection/abscess in segment 5 (abdomen 26:21). Remainder of the   liver demonstrates a couple thinly septated cysts including a 1.1 cm cyst   in the caudate and a 1.2 cm cyst in the lateral left lobe. Additionally,   multiple serosal tumor deposits along the liver surface, further   described below.    BILE DUCTS: Mild intra and extra hepatic biliary ductal dilatation with   sludge and multiple filling defects in the extrahepatic common bile duct,   compatible with stones.    SPLEEN: Within normal limits.  PANCREAS: Within normal limits.  ADRENALS: Within normal limits.  KIDNEYS/URETERS: A small left renal cyst.    Evaluation of the pelvic organs is limited by artifact.  BLADDER: Within normal limits.  REPRODUCTIVE ORGANS:  Uterus: Anteverted anteflexed measuring 10.8 x 3.7 x 5.7 cm.  Endometrium: Endometrium is distended to 1.4 cm at the fundus with T1   hyperintense signal without discrete enhancement, favoring hemorrhagic   debris versus less likely underlying lesion.  Right ovary: Not discretely visualized, possibly obscured by peritoneal   carcinomatosis.  Left ovary/adnexa: Replaced by a T2 heterogeneous ovoid mass measuring   4.2 x 3.8 x 5.3 cm, highly suspicious for neoplasm.    BOWEL: No bowel obstruction.  PERITONEUM: Small volume ascites, including small perihepatic, bilateral   paracolic gutters and in the deep pelvis. Diffuse peritoneal and omental   nodularity, compatible with carcinomatosis with references as follows:  In left upper quadrant inferior to the spleen measuring 7.4 x 2.1 cm   (abdomen 4:21).  Multiple tiny implants along the serosal surface of the liver with a   reference measuring1 cm (abdomen 4:27) at the inferior right lobe.  A horseshoe shaped deposit in the cul-de-sac measures 6.8 x 1.7 x 4 cm   (pelvis 5:16).  Multiple deposits in the bilateral adnexa.  Thick omental caking deep to the umbilicus.    VESSELS: Patent portal and hepatic veins.  RETROPERITONEUM/LYMPH NODES: No lymphadenopathy.  ABDOMINAL WALL: A small fat-containing right inguinal hernia.  BONES: Degenerative changes. A T10 vertebral body hemangioma.    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   Recommend gynecologic/oncologic consult and consider tissue sampling for   confirmation.    Cholelithiasis with discontinuity of the inflamed gallbladder wall   contiguous with a 4.3 cm multiloculated collection in the adjacent liver   in segment 5, most compatible with perforated cholecystitis with   intrahepatic abscess. Given additional MR findings highly suspicious for   malignancy, an underlying malignant/metastatic liver lesion cannot be   entirely excluded. Multiple serosal implants/tumor deposits along the   surface of the liver.    Choledocholithiasis resulting in biliary ductal dilatation.    Endometrium is distended to 1.4 cm with nonenhancing T1 hyperintense   material, favoring hemorrhagic debris versus less likely underlying   lesion.

## 2024-05-07 LAB
ALBUMIN SERPL ELPH-MCNC: 3 G/DL — LOW (ref 3.3–5)
ALP SERPL-CCNC: 77 U/L — SIGNIFICANT CHANGE UP (ref 40–120)
ALT FLD-CCNC: 20 U/L — SIGNIFICANT CHANGE UP (ref 10–45)
ANION GAP SERPL CALC-SCNC: 11 MMOL/L — SIGNIFICANT CHANGE UP (ref 5–17)
AST SERPL-CCNC: 17 U/L — SIGNIFICANT CHANGE UP (ref 10–40)
BILIRUB SERPL-MCNC: 0.2 MG/DL — SIGNIFICANT CHANGE UP (ref 0.2–1.2)
BUN SERPL-MCNC: 13 MG/DL — SIGNIFICANT CHANGE UP (ref 7–23)
CALCIUM SERPL-MCNC: 9 MG/DL — SIGNIFICANT CHANGE UP (ref 8.4–10.5)
CHLORIDE SERPL-SCNC: 99 MMOL/L — SIGNIFICANT CHANGE UP (ref 96–108)
CO2 SERPL-SCNC: 29 MMOL/L — SIGNIFICANT CHANGE UP (ref 22–31)
CREAT SERPL-MCNC: 0.5 MG/DL — SIGNIFICANT CHANGE UP (ref 0.5–1.3)
EGFR: 94 ML/MIN/1.73M2 — SIGNIFICANT CHANGE UP
GLUCOSE SERPL-MCNC: 134 MG/DL — HIGH (ref 70–99)
HCT VFR BLD CALC: 30.2 % — LOW (ref 34.5–45)
HGB BLD-MCNC: 9.5 G/DL — LOW (ref 11.5–15.5)
MAGNESIUM SERPL-MCNC: 2.3 MG/DL — SIGNIFICANT CHANGE UP (ref 1.6–2.6)
MCHC RBC-ENTMCNC: 28.6 PG — SIGNIFICANT CHANGE UP (ref 27–34)
MCHC RBC-ENTMCNC: 31.5 GM/DL — LOW (ref 32–36)
MCV RBC AUTO: 91 FL — SIGNIFICANT CHANGE UP (ref 80–100)
NRBC # BLD: 0 /100 WBCS — SIGNIFICANT CHANGE UP (ref 0–0)
PHOSPHATE SERPL-MCNC: 2.4 MG/DL — LOW (ref 2.5–4.5)
PLATELET # BLD AUTO: 281 K/UL — SIGNIFICANT CHANGE UP (ref 150–400)
POTASSIUM SERPL-MCNC: 4 MMOL/L — SIGNIFICANT CHANGE UP (ref 3.5–5.3)
POTASSIUM SERPL-SCNC: 4 MMOL/L — SIGNIFICANT CHANGE UP (ref 3.5–5.3)
PROT SERPL-MCNC: 6.2 G/DL — SIGNIFICANT CHANGE UP (ref 6–8.3)
RBC # BLD: 3.32 M/UL — LOW (ref 3.8–5.2)
RBC # FLD: 14.9 % — HIGH (ref 10.3–14.5)
SODIUM SERPL-SCNC: 139 MMOL/L — SIGNIFICANT CHANGE UP (ref 135–145)
WBC # BLD: 9.15 K/UL — SIGNIFICANT CHANGE UP (ref 3.8–10.5)
WBC # FLD AUTO: 9.15 K/UL — SIGNIFICANT CHANGE UP (ref 3.8–10.5)

## 2024-05-07 PROCEDURE — 99231 SBSQ HOSP IP/OBS SF/LOW 25: CPT

## 2024-05-07 PROCEDURE — 99233 SBSQ HOSP IP/OBS HIGH 50: CPT

## 2024-05-07 PROCEDURE — 99222 1ST HOSP IP/OBS MODERATE 55: CPT | Mod: GC

## 2024-05-07 PROCEDURE — 99233 SBSQ HOSP IP/OBS HIGH 50: CPT | Mod: GC

## 2024-05-07 RX ORDER — SODIUM,POTASSIUM PHOSPHATES 278-250MG
1 POWDER IN PACKET (EA) ORAL
Refills: 0 | Status: COMPLETED | OUTPATIENT
Start: 2024-05-07 | End: 2024-05-07

## 2024-05-07 RX ORDER — POLYETHYLENE GLYCOL 3350 17 G/17G
17 POWDER, FOR SOLUTION ORAL
Refills: 0 | Status: DISCONTINUED | OUTPATIENT
Start: 2024-05-07 | End: 2024-05-10

## 2024-05-07 RX ADMIN — Medication 200 GRAM(S): at 18:09

## 2024-05-07 RX ADMIN — Medication 5 MILLIGRAM(S): at 22:06

## 2024-05-07 RX ADMIN — MUPIROCIN 1 APPLICATION(S): 20 OINTMENT TOPICAL at 05:47

## 2024-05-07 RX ADMIN — GABAPENTIN 800 MILLIGRAM(S): 400 CAPSULE ORAL at 05:46

## 2024-05-07 RX ADMIN — Medication 3 MILLILITER(S): at 05:46

## 2024-05-07 RX ADMIN — Medication 1 PACKET(S): at 12:30

## 2024-05-07 RX ADMIN — NYSTATIN CREAM 1 APPLICATION(S): 100000 CREAM TOPICAL at 13:54

## 2024-05-07 RX ADMIN — Medication 40 MILLIGRAM(S): at 05:46

## 2024-05-07 RX ADMIN — Medication 200 GRAM(S): at 05:46

## 2024-05-07 RX ADMIN — Medication 200 GRAM(S): at 12:28

## 2024-05-07 RX ADMIN — Medication 3 MILLILITER(S): at 12:28

## 2024-05-07 RX ADMIN — MUPIROCIN 1 APPLICATION(S): 20 OINTMENT TOPICAL at 18:19

## 2024-05-07 RX ADMIN — NYSTATIN CREAM 1 APPLICATION(S): 100000 CREAM TOPICAL at 05:46

## 2024-05-07 RX ADMIN — CHLORHEXIDINE GLUCONATE 1 APPLICATION(S): 213 SOLUTION TOPICAL at 12:29

## 2024-05-07 RX ADMIN — NYSTATIN CREAM 1 APPLICATION(S): 100000 CREAM TOPICAL at 22:06

## 2024-05-07 RX ADMIN — Medication 3 MILLILITER(S): at 23:15

## 2024-05-07 RX ADMIN — LORATADINE 10 MILLIGRAM(S): 10 TABLET ORAL at 12:29

## 2024-05-07 RX ADMIN — ERTAPENEM SODIUM 120 MILLIGRAM(S): 1 INJECTION, POWDER, LYOPHILIZED, FOR SOLUTION INTRAMUSCULAR; INTRAVENOUS at 16:27

## 2024-05-07 RX ADMIN — GABAPENTIN 800 MILLIGRAM(S): 400 CAPSULE ORAL at 18:08

## 2024-05-07 RX ADMIN — Medication 0.25 MILLIGRAM(S): at 18:24

## 2024-05-07 RX ADMIN — SENNA PLUS 2 TABLET(S): 8.6 TABLET ORAL at 22:06

## 2024-05-07 RX ADMIN — Medication 1 PACKET(S): at 18:11

## 2024-05-07 RX ADMIN — Medication 0.25 MILLIGRAM(S): at 05:55

## 2024-05-07 RX ADMIN — Medication 50 MICROGRAM(S): at 05:46

## 2024-05-07 RX ADMIN — Medication 1 PACKET(S): at 09:24

## 2024-05-07 RX ADMIN — Medication 3 MILLILITER(S): at 18:08

## 2024-05-07 NOTE — PROGRESS NOTE ADULT - PROBLEM SELECTOR PLAN 3
CT A/P suggestive of ovarian malignancy w/ carcinomatosis. 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   Elevated tumor marker: CEA 4.2, Ca 19-9 110, CA-125 801.     - f/u gyn onc recs -> outpatient tissue biopsy

## 2024-05-07 NOTE — PROGRESS NOTE ADULT - SUBJECTIVE AND OBJECTIVE BOX
Gastroenterology/Hepatology Progress Note      Interval Events:   - Still requiring O2, has cough which is persistent.   - minimal o/p from the IR drain.   - No fevers or chills.   - Denied abd pain, nausea and vomiting.   - Tolerating po diet well.     Allergies:  Milk (Rash)  latex (Rash)  erythromycin (Rash)  phenobarbital (Rash)      Hospital Medications:  acetaminophen     Tablet .. 650 milliGRAM(s) Oral every 6 hours PRN  albuterol/ipratropium for Nebulization 3 milliLiter(s) Nebulizer every 6 hours  ampicillin  IVPB 2 Gram(s) IV Intermittent every 6 hours  bisacodyl 5 milliGRAM(s) Oral at bedtime  buDESOnide    Inhalation Suspension 0.25 milliGRAM(s) Inhalation two times a day  chlorhexidine 4% Liquid 1 Application(s) Topical daily  ertapenem  IVPB 1000 milliGRAM(s) IV Intermittent every 24 hours  gabapentin 800 milliGRAM(s) Oral every 12 hours  levothyroxine 50 MICROGram(s) Oral daily  loratadine 10 milliGRAM(s) Oral daily  mupirocin 2% Nasal 1 Application(s) Both Nostrils two times a day  nystatin Powder 1 Application(s) Topical three times a day  oxyCODONE    IR 5 milliGRAM(s) Oral every 4 hours PRN  polyethylene glycol 3350 17 Gram(s) Oral daily  potassium phosphate / sodium phosphate Powder (PHOS-NaK) 1 Packet(s) Oral three times a day with meals  predniSONE   Tablet 40 milliGRAM(s) Oral daily  senna 2 Tablet(s) Oral at bedtime      ROS: 14 point ROS negative unless otherwise state in subjective    PHYSICAL EXAM:   Vital Signs:  Vital Signs Last 24 Hrs  T(C): 36.3 (07 May 2024 04:15), Max: 36.4 (06 May 2024 20:54)  T(F): 97.4 (07 May 2024 04:15), Max: 97.6 (06 May 2024 20:54)  HR: 75 (07 May 2024 11:00) (67 - 86)  BP: 123/66 (07 May 2024 11:00) (100/66 - 123/66)  BP(mean): --  RR: 18 (07 May 2024 04:15) (18 - 18)  SpO2: 94% (07 May 2024 11:00) (92% - 96%)    Parameters below as of 07 May 2024 11:00  Patient On (Oxygen Delivery Method): nasal cannula  O2 Flow (L/min): 2    Daily     Daily     GENERAL:  No acute distress, obese female, lying in bed.   HEENT:  NCAT, no scleral icterus, has NC in place.   CHEST: no resp distress  HEART:  RRR  ABDOMEN:  Soft, non-tender, obese abd, moderately distended, has external drain in the RUQ,   EXTREMITIES:  No LE edema b/l  SKIN:  No rash/erythema/ecchymoses/petechiae/wounds/abscess/warm/dry  NEURO:  Alert and oriented x 3,  no tremor    LABS:                        9.5    9.15  )-----------( 281      ( 07 May 2024 07:07 )             30.2     Mean Cell Volume: 91.0 fl (05-07-24 @ 07:07)    05-07    139  |  99  |  13  ----------------------------<  134<H>  4.0   |  29  |  0.50    Ca    9.0      07 May 2024 07:07  Phos  2.4     05-07  Mg     2.3     05-07    TPro  6.2  /  Alb  3.0<L>  /  TBili  0.2  /  DBili  x   /  AST  17  /  ALT  20  /  AlkPhos  77  05-07    LIVER FUNCTIONS - ( 07 May 2024 07:07 )  Alb: 3.0 g/dL / Pro: 6.2 g/dL / ALK PHOS: 77 U/L / ALT: 20 U/L / AST: 17 U/L / GGT: x             Urinalysis Basic - ( 07 May 2024 07:07 )    Color: x / Appearance: x / SG: x / pH: x  Gluc: 134 mg/dL / Ketone: x  / Bili: x / Urobili: x   Blood: x / Protein: x / Nitrite: x   Leuk Esterase: x / RBC: x / WBC x   Sq Epi: x / Non Sq Epi: x / Bacteria: x            Imaging:          MRCP on 5/1    FINDINGS:  LOWER CHEST: Small left and trace right pleural effusions with adjacent   passive atelectasis in the left lower lobe, as on earlier same day CT.    LIVER/GALLBLADDER: Cholelithiasis in a nondistended, but inflamed   gallbladder, with gallbladder wall thickening and irregularity, most   compatible with gangrenous/perforated cholecystitis. Evidence of   perforated cholecystitis with discontinuity of the gallbladder wall at   the liver surface contiguous with a 4.3 x 2.7 x 4.2 cm multiloculated   fluid collection/abscess in segment 5 (abdomen 26:21). Remainder of the   liver demonstrates a couple thinly septated cysts including a 1.1 cm cyst   in the caudate and a 1.2 cm cyst in the lateral left lobe. Additionally,   multiple serosal tumor deposits along the liver surface, further   described below.    BILE DUCTS: Mild intra and extra hepatic biliary ductal dilatation with   sludge and multiple filling defects in the extrahepatic common bile duct,   compatible with stones.    SPLEEN: Within normal limits.  PANCREAS: Within normal limits.  ADRENALS: Within normal limits.  KIDNEYS/URETERS: A small left renal cyst.    Evaluation of the pelvic organs is limited by artifact.  BLADDER: Within normal limits.  REPRODUCTIVE ORGANS:  Uterus: Anteverted anteflexed measuring 10.8 x 3.7 x 5.7 cm.  Endometrium: Endometrium is distended to 1.4 cm at the fundus with T1   hyperintense signal without discrete enhancement, favoring hemorrhagic   debris versus less likely underlying lesion.  Right ovary: Not discretely visualized, possibly obscured by peritoneal   carcinomatosis.  Left ovary/adnexa: Replaced by a T2 heterogeneous ovoid mass measuring   4.2 x 3.8 x 5.3 cm, highly suspicious for neoplasm.    BOWEL: No bowel obstruction.  PERITONEUM: Small volume ascites, including small perihepatic, bilateral   paracolic gutters and in the deep pelvis. Diffuse peritoneal and omental   nodularity, compatible with carcinomatosis with references as follows:  In left upper quadrant inferior to the spleen measuring 7.4 x 2.1 cm   (abdomen 4:21).  Multiple tiny implants along the serosal surface of the liver with a   reference measuring1 cm (abdomen 4:27) at the inferior right lobe.  A horseshoe shaped deposit in the cul-de-sac measures 6.8 x 1.7 x 4 cm   (pelvis 5:16).  Multiple deposits in the bilateral adnexa.  Thick omental caking deep to the umbilicus.    VESSELS: Patent portal and hepatic veins.  RETROPERITONEUM/LYMPH NODES: No lymphadenopathy.  ABDOMINAL WALL: A small fat-containing right inguinal hernia.  BONES: Degenerative changes. A T10 vertebral body hemangioma.    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   Recommend gynecologic/oncologic consult and consider tissue sampling for   confirmation.    Cholelithiasis with discontinuity of the inflamed gallbladder wall   contiguous with a 4.3 cm multiloculated collection in the adjacent liver   in segment 5, most compatible with perforated cholecystitis with   intrahepatic abscess. Given additional MR findings highly suspicious for   malignancy, an underlying malignant/metastatic liver lesion cannot be   entirely excluded. Multiple serosal implants/tumor deposits along the   surface of the liver.    Choledocholithiasis resulting in biliary ductal dilatation.    Endometrium is distended to 1.4 cm with nonenhancing T1 hyperintense   material, favoring hemorrhagic debris versus less likely underlying   lesion.

## 2024-05-07 NOTE — CONSULT NOTE ADULT - SUBJECTIVE AND OBJECTIVE BOX
Pulmonology Consult Note     CHIEF COMPLAINT:Patient is a 83y old  Female who presents with a chief complaint of Sepsis, perforated gallbladder, sob, poss metastatic Ovarian CA (07 May 2024 12:54)      HPI:  83 female history of COPD not on O2, morbid obesity, HTN, CHF, BCC, hypothyroidism, psoriatic arthritis, SUZIE, currently on abx for presumed PNA, BIBEMS for acute onset SOB.      Pt admitted from [facility] where she is long term resident, and she has had increased work of breathing for 1 day.  Per ED report, patient was diagnosed recently with left-sided pneumonia, receiving IV zosyn and IVF at the facility.  She reports weakness along with emesis around 5 days ago improved though she still reports poor appetite. She also reports right-sided abdominal pain and a dry cough which she suspects is related to allergies. Denies CP, sore throat, nausea, urinary/bowel changes except constipation, numbness/tingling, leg swelling.     In the ED, patient found to be febrile to 38.4, HR 94, maintained on NC, given CTX, APAP, Nebs, with urine and blood cultures sent.  (01 May 2024 04:54)      PAST MEDICAL & SURGICAL HISTORY:  Hypothyroid      Chronic bronchitis      Psoriatic arthritis      Osteoarthritis      Overactive bladder      Basal cell carcinoma  LLE s/p Moh's excision      Squamous cell carcinoma  LLE s/p Moh's excision      History of carpal tunnel release  L wrist      H/O total shoulder replacement, left      H/O:           FAMILY HISTORY:  Family history of CHF (congestive heart failure) (Father)  Father,  49y/o    Family history of hypertension in mother (Mother)        SOCIAL HISTORY:  Smoking: [ ] Never Smoked [ ] Former Smoker (__ packs x ___ years) [ ] Current Smoker  (__ packs x ___ years)  Substance Use: [ ] Never Used [ ] Used ____  EtOH Use:  Marital Status: [ ] Single [ ]  [ ]  [ ]   Sexual History:   Occupation:  Recent Travel:  Country of Birth:  Advance Directives:    Allergies    latex (Rash)  erythromycin (Rash)  phenobarbital (Rash)    Intolerances    Milk (Rash)      HOME MEDICATIONS:  Home Medications:  Advair Diskus 250 mcg-50 mcg inhalation powder: 1 puff(s) inhaled 2 times a day (01 May 2024 06:02)  alendronate 70 mg oral tablet: 1 tab(s) orally once a week on Sundays (01 May 2024 06:02)  amLODIPine 5 mg oral tablet: 1 tab(s) orally once a day (01 May 2024 05:51)  Aspirin Enteric Coated 81 mg oral delayed release tablet: 1 tab(s) orally once a day (01 May 2024 06:02)  baclofen 10 mg oral tablet: 1 tab(s) orally 3 times a day, As Needed (01 May 2024 06:02)  Calcium 500+D oral tablet, chewable: 1 tab(s) orally 2 times a day (01 May 2024 06:02)  capsaicin 0.075% topical cream: 1 application topically 2 times a day (01 May 2024 06:02)  famotidine 20 mg oral tablet: 1 tab(s) orally 3 times a day (01 May 2024 06:02)  Flonase 50 mcg/inh nasal spray: 2 spray(s) nasal once a day (01 May 2024 06:02)  furosemide 20 mg oral tablet: 1 tab(s) orally once a day (01 May 2024 05:55)  gabapentin 800 mg oral tablet: 1 tab(s) orally 3 times a day (01 May 2024 08:50)  levothyroxine 50 mcg (0.05 mg) oral tablet: 1 tab(s) orally once a day (01 May 2024 06:02)  loratadine 10 mg oral tablet: 1 tab(s) orally once a day (01 May 2024 06:02)  metoprolol tartrate 25 mg oral tablet: 0.5 tab(s) orally every 12 hours (01 May 2024 05:53)  oxyBUTYnin 10 mg/24 hr oral tablet, extended release: 1 tab(s) orally once a day (01 May 2024 05:56)  potassium chloride 20 mEq oral tablet, extended release: 2 tab(s) orally once a day (01 May 2024 05:57)  pravastatin 10 mg oral tablet: 1 tab(s) orally once a day (at bedtime) (01 May 2024 08:49)  Singulair 10 mg oral tablet: 1 tab(s) orally once a day (01 May 2024 06:02)  sulfaSALAzine 500 mg oral delayed release tablet: 2 tab(s) orally every 12 hours (01 May 2024 05:58)  Synthroid 50 mcg (0.05 mg) oral tablet: 1 tab(s) orally once a day (01 May 2024 05:53)      REVIEW OF SYSTEMS:  Constitutional: [ ] negative [ ] fevers [ ] chills [ ] weight loss [ ] weight gain  HEENT: [ ] negative [ ] dry eyes [ ] eye irritation [ ] postnasal drip [ ] nasal congestion  CV: [ ] negative  [ ] chest pain [ ] orthopnea [ ] palpitations [ ] murmur  Resp: [ ] negative [ ] cough [ ] shortness of breath [ ] dyspnea [ ] wheezing [ ] sputum [ ] hemoptysis  GI: [ ] negative [ ] nausea [ ] vomiting [ ] diarrhea [ ] constipation [ ] abd pain [ ] dysphagia   : [ ] negative [ ] dysuria [ ] nocturia [ ] hematuria [ ] increased urinary frequency  Musculoskeletal: [ ] negative [ ] back pain [ ] myalgias [ ] arthralgias [ ] fracture  Skin: [ ] negative [ ] rash [ ] itch  Neurological: [ ] negative [ ] headache [ ] dizziness [ ] syncope [ ] weakness [ ] numbness  Psychiatric: [ ] negative [ ] anxiety [ ] depression  Endocrine: [ ] negative [ ] diabetes [ ] thyroid problem  Hematologic/Lymphatic: [ ] negative [ ] anemia [ ] bleeding problem  Allergic/Immunologic: [ ] negative [ ] itchy eyes [ ] nasal discharge [ ] hives [ ] angioedema  [x] All other systems negative  [ ] Unable to assess ROS because ________    OBJECTIVE:  ICU Vital Signs Last 24 Hrs  T(C): 36.5 (07 May 2024 13:37), Max: 36.5 (07 May 2024 13:37)  T(F): 97.7 (07 May 2024 13:37), Max: 97.7 (07 May 2024 13:37)  HR: 73 (07 May 2024 13:37) (67 - 86)  BP: 111/71 (07 May 2024 13:37) (100/66 - 123/66)  BP(mean): --  ABP: --  ABP(mean): --  RR: 18 (07 May 2024 13:37) (18 - 18)  SpO2: 94% (07 May 2024 13:37) (92% - 96%)    O2 Parameters below as of 07 May 2024 13:37  Patient On (Oxygen Delivery Method): nasal cannula              - @ 07: @ 07:00  --------------------------------------------------------  IN: 340 mL / OUT: 1319.5 mL / NET: -979.5 mL     @ 07: @ 14:38  --------------------------------------------------------  IN: 240 mL / OUT: 0 mL / NET: 240 mL      CAPILLARY BLOOD GLUCOSE          PHYSICAL EXAM:  GENERAL: NAD, well-groomed, well-developed  HEAD:  Atraumatic, Normocephalic  EYES: EOMI, conjunctiva and sclera clear  ENMT: Moist mucous membranes  CHEST/LUNG: Clear to auscultation bilaterally; No rales, rhonchi, wheezing, or rubs  HEART: Regular rate and rhythm; No murmurs, rubs, or gallops  ABDOMEN: Nondistended  VASCULAR: No  cyanosis, or edema  SKIN: No rashes or lesions  NERVOUS SYSTEM:  Alert & Oriented X3, Good concentration    HOSPITAL MEDICATIONS:  Standing Meds:  albuterol/ipratropium for Nebulization 3 milliLiter(s) Nebulizer every 6 hours  ampicillin  IVPB 2 Gram(s) IV Intermittent every 6 hours  bisacodyl 5 milliGRAM(s) Oral at bedtime  buDESOnide    Inhalation Suspension 0.25 milliGRAM(s) Inhalation two times a day  chlorhexidine 4% Liquid 1 Application(s) Topical daily  ertapenem  IVPB 1000 milliGRAM(s) IV Intermittent every 24 hours  gabapentin 800 milliGRAM(s) Oral every 12 hours  levothyroxine 50 MICROGram(s) Oral daily  loratadine 10 milliGRAM(s) Oral daily  mupirocin 2% Nasal 1 Application(s) Both Nostrils two times a day  nystatin Powder 1 Application(s) Topical three times a day  polyethylene glycol 3350 17 Gram(s) Oral daily  potassium phosphate / sodium phosphate Powder (PHOS-NaK) 1 Packet(s) Oral three times a day with meals  predniSONE   Tablet 40 milliGRAM(s) Oral daily  senna 2 Tablet(s) Oral at bedtime      PRN Meds:  acetaminophen     Tablet .. 650 milliGRAM(s) Oral every 6 hours PRN  oxyCODONE    IR 5 milliGRAM(s) Oral every 4 hours PRN      LABS:        139  |  99  |  13  ----------------------------<  134<H>  4.0   |  29  |  0.50      136  |  97  |  15  ----------------------------<  158<H>  4.1   |  28  |  0.56      136  |  99  |  10  ----------------------------<  159<H>  3.9   |  26  |  0.52    Ca    9.0      07 May 2024 07:07  Ca    8.7      06 May 2024 06:37  Ca    8.5      05 May 2024 07:02  Phos  2.4       Mg     2.3         TPro  6.2  /  Alb  3.0<L>  /  TBili  0.2  /  DBili  x   /  AST  17  /  ALT  20  /  AlkPhos  77    TPro  6.4  /  Alb  2.9<L>  /  TBili  0.3  /  DBili  x   /  AST  18  /  ALT  19  /  AlkPhos  87    TPro  5.9<L>  /  Alb  2.6<L>  /  TBili  0.5  /  DBili  x   /  AST  15  /  ALT  15  /  AlkPhos  85  0505    Magnesium: 2.3 mg/dL (24 @ 07:07)  Magnesium: 2.0 mg/dL (24 @ 06:37)  Magnesium: 1.8 mg/dL (24 @ 07:02)    Phosphorus: 2.4 mg/dL (24 @ 07:07)  Phosphorus: 2.8 mg/dL (24 @ 06:37)  Phosphorus: 2.9 mg/dL (24 @ 07:02)                    Urinalysis Basic - ( 07 May 2024 07:07 )    Color: x / Appearance: x / SG: x / pH: x  Gluc: 134 mg/dL / Ketone: x  / Bili: x / Urobili: x   Blood: x / Protein: x / Nitrite: x   Leuk Esterase: x / RBC: x / WBC x   Sq Epi: x / Non Sq Epi: x / Bacteria: x                              9.5    9.15  )-----------( 281      ( 07 May 2024 07:07 )             30.2                         9.7    11.26 )-----------( 266      ( 06 May 2024 06:37 )             30.7                         9.3    10.45 )-----------( 226      ( 05 May 2024 06:57 )             29.0     CAPILLARY BLOOD GLUCOSE            MICROBIOLOGY:       RADIOLOGY:  [ ] Reviewed and interpreted by me   Pulmonology Consult Note     CHIEF COMPLAINT:Patient is a 83y old  Female who presents with a chief complaint of Sepsis, perforated gallbladder, sob, poss metastatic Ovarian CA (07 May 2024 12:54)      HPI:  83 female history of COPD not on O2, morbid obesity, HTN, CHF, BCC, hypothyroidism, psoriatic arthritis, SUZIE, currently on abx for presumed PNA, BIBEMS for acute onset SOB.      Pt admitted from [facility] where she is long term resident, and she has had increased work of breathing for 1 day.  Per ED report, patient was diagnosed recently with left-sided pneumonia, receiving IV zosyn and IVF at the facility.  She reports weakness along with emesis around 5 days ago improved though she still reports poor appetite. She also reports right-sided abdominal pain and a dry cough which she suspects is related to allergies. Denies CP, sore throat, nausea, urinary/bowel changes except constipation, numbness/tingling, leg swelling.     Pt notes having some dyspnea, dry cough and wheezing when presenting to the hospital. She reports that is much improved.     S/p Perc Mary Carmen by IR, growing ESBL E. Coli. Also found to have choleducocholelithiasis for which pt is planned for an ERCP.      Pulmonology Consulted for pre operative clearance.      PAST MEDICAL & SURGICAL HISTORY:  Hypothyroid      Chronic bronchitis      Psoriatic arthritis      Osteoarthritis      Overactive bladder      Basal cell carcinoma  LLE s/p Moh's excision      Squamous cell carcinoma  LLE s/p Moh's excision      History of carpal tunnel release  L wrist      H/O total shoulder replacement, left      H/O:           FAMILY HISTORY:  Family history of CHF (congestive heart failure) (Father)  Father,  47y/o    Family history of hypertension in mother (Mother)        SOCIAL HISTORY:  as above     Allergies    latex (Rash)  erythromycin (Rash)  phenobarbital (Rash)    Intolerances    Milk (Rash)      HOME MEDICATIONS:  Home Medications:  Advair Diskus 250 mcg-50 mcg inhalation powder: 1 puff(s) inhaled 2 times a day (01 May 2024 06:02)  alendronate 70 mg oral tablet: 1 tab(s) orally once a week on Sundays (01 May 2024 06:02)  amLODIPine 5 mg oral tablet: 1 tab(s) orally once a day (01 May 2024 05:51)  Aspirin Enteric Coated 81 mg oral delayed release tablet: 1 tab(s) orally once a day (01 May 2024 06:02)  baclofen 10 mg oral tablet: 1 tab(s) orally 3 times a day, As Needed (01 May 2024 06:02)  Calcium 500+D oral tablet, chewable: 1 tab(s) orally 2 times a day (01 May 2024 06:02)  capsaicin 0.075% topical cream: 1 application topically 2 times a day (01 May 2024 06:02)  famotidine 20 mg oral tablet: 1 tab(s) orally 3 times a day (01 May 2024 06:02)  Flonase 50 mcg/inh nasal spray: 2 spray(s) nasal once a day (01 May 2024 06:02)  furosemide 20 mg oral tablet: 1 tab(s) orally once a day (01 May 2024 05:55)  gabapentin 800 mg oral tablet: 1 tab(s) orally 3 times a day (01 May 2024 08:50)  levothyroxine 50 mcg (0.05 mg) oral tablet: 1 tab(s) orally once a day (01 May 2024 06:02)  loratadine 10 mg oral tablet: 1 tab(s) orally once a day (01 May 2024 06:02)  metoprolol tartrate 25 mg oral tablet: 0.5 tab(s) orally every 12 hours (01 May 2024 05:53)  oxyBUTYnin 10 mg/24 hr oral tablet, extended release: 1 tab(s) orally once a day (01 May 2024 05:56)  potassium chloride 20 mEq oral tablet, extended release: 2 tab(s) orally once a day (01 May 2024 05:57)  pravastatin 10 mg oral tablet: 1 tab(s) orally once a day (at bedtime) (01 May 2024 08:49)  Singulair 10 mg oral tablet: 1 tab(s) orally once a day (01 May 2024 06:02)  sulfaSALAzine 500 mg oral delayed release tablet: 2 tab(s) orally every 12 hours (01 May 2024 05:58)  Synthroid 50 mcg (0.05 mg) oral tablet: 1 tab(s) orally once a day (01 May 2024 05:53)      REVIEW OF SYSTEMS:  Constitutional: [ ] negative [ ] fevers [ ] chills [ ] weight loss [ ] weight gain  HEENT: [ ] negative [ ] dry eyes [ ] eye irritation [ ] postnasal drip [ ] nasal congestion  CV: [ ] negative  [ ] chest pain [ ] orthopnea [ ] palpitations [ ] murmur  Resp: [ ] negative [x] cough [x] shortness of breath [x] dyspnea [x] wheezing [ ] sputum [ ] hemoptysis  GI: [ ] negative [ ] nausea [ ] vomiting [ ] diarrhea [ ] constipation [x] abd pain [ ] dysphagia   : [ ] negative [ ] dysuria [ ] nocturia [ ] hematuria [ ] increased urinary frequency  Musculoskeletal: [ ] negative [ ] back pain [ ] myalgias [ ] arthralgias [ ] fracture  Skin: [ ] negative [ ] rash [ ] itch  Neurological: [ ] negative [ ] headache [ ] dizziness [ ] syncope [ ] weakness [ ] numbness  Psychiatric: [ ] negative [ ] anxiety [ ] depression  Endocrine: [ ] negative [ ] diabetes [ ] thyroid problem  Hematologic/Lymphatic: [ ] negative [ ] anemia [ ] bleeding problem  Allergic/Immunologic: [ ] negative [ ] itchy eyes [ ] nasal discharge [ ] hives [ ] angioedema  [x] All other systems negative  [ ] Unable to assess ROS because ________    OBJECTIVE:  ICU Vital Signs Last 24 Hrs  T(C): 36.5 (07 May 2024 13:37), Max: 36.5 (07 May 2024 13:37)  T(F): 97.7 (07 May 2024 13:37), Max: 97.7 (07 May 2024 13:37)  HR: 73 (07 May 2024 13:37) (67 - 86)  BP: 111/71 (07 May 2024 13:37) (100/66 - 123/66)  BP(mean): --  ABP: --  ABP(mean): --  RR: 18 (07 May 2024 13:37) (18 - 18)  SpO2: 94% (07 May 2024 13:37) (92% - 96%)    O2 Parameters below as of 07 May 2024 13:37  Patient On (Oxygen Delivery Method): nasal cannula               @ 07:01  -   @ 07:00  --------------------------------------------------------  IN: 340 mL / OUT: 1319.5 mL / NET: -979.5 mL     @ 07:01   @ 14:38  --------------------------------------------------------  IN: 240 mL / OUT: 0 mL / NET: 240 mL      CAPILLARY BLOOD GLUCOSE          PHYSICAL EXAM:  GENERAL: NAD, well-groomed, well-developed  HEAD:  Atraumatic, Normocephalic  EYES: EOMI, conjunctiva and sclera clear  ENMT: Moist mucous membranes  CHEST/LUNG: Rhonchi bilateral bases.  HEART: Regular rate and rhythm   ABDOMEN: Nondistended  VASCULAR: No  cyanosis,   SKIN: No rashes or lesions  NERVOUS SYSTEM:  Alert & Oriented     HOSPITAL MEDICATIONS:  Standing Meds:  albuterol/ipratropium for Nebulization 3 milliLiter(s) Nebulizer every 6 hours  ampicillin  IVPB 2 Gram(s) IV Intermittent every 6 hours  bisacodyl 5 milliGRAM(s) Oral at bedtime  buDESOnide    Inhalation Suspension 0.25 milliGRAM(s) Inhalation two times a day  chlorhexidine 4% Liquid 1 Application(s) Topical daily  ertapenem  IVPB 1000 milliGRAM(s) IV Intermittent every 24 hours  gabapentin 800 milliGRAM(s) Oral every 12 hours  levothyroxine 50 MICROGram(s) Oral daily  loratadine 10 milliGRAM(s) Oral daily  mupirocin 2% Nasal 1 Application(s) Both Nostrils two times a day  nystatin Powder 1 Application(s) Topical three times a day  polyethylene glycol 3350 17 Gram(s) Oral daily  potassium phosphate / sodium phosphate Powder (PHOS-NaK) 1 Packet(s) Oral three times a day with meals  predniSONE   Tablet 40 milliGRAM(s) Oral daily  senna 2 Tablet(s) Oral at bedtime      PRN Meds:  acetaminophen     Tablet .. 650 milliGRAM(s) Oral every 6 hours PRN  oxyCODONE    IR 5 milliGRAM(s) Oral every 4 hours PRN      LABS:        139  |  99  |  13  ----------------------------<  134<H>  4.0   |  29  |  0.50      136  |  97  |  15  ----------------------------<  158<H>  4.1   |  28  |  0.56      136  |  99  |  10  ----------------------------<  159<H>  3.9   |  26  |  0.52    Ca    9.0      07 May 2024 07:07  Ca    8.7      06 May 2024 06:37  Ca    8.5      05 May 2024 07:02  Phos  2.4       Mg     2.3         TPro  6.2  /  Alb  3.0<L>  /  TBili  0.2  /  DBili  x   /  AST  17  /  ALT  20  /  AlkPhos  77    TPro  6.4  /  Alb  2.9<L>  /  TBili  0.3  /  DBili  x   /  AST  18  /  ALT  19  /  AlkPhos  87  -  TPro  5.9<L>  /  Alb  2.6<L>  /  TBili  0.5  /  DBili  x   /  AST  15  /  ALT  15  /  AlkPhos  85  05-05    Magnesium: 2.3 mg/dL (24 @ 07:07)  Magnesium: 2.0 mg/dL (24 @ 06:37)  Magnesium: 1.8 mg/dL (24 @ 07:02)    Phosphorus: 2.4 mg/dL (24 @ 07:07)  Phosphorus: 2.8 mg/dL (24 @ 06:37)  Phosphorus: 2.9 mg/dL (24 @ 07:02)                    Urinalysis Basic - ( 07 May 2024 07:07 )    Color: x / Appearance: x / SG: x / pH: x  Gluc: 134 mg/dL / Ketone: x  / Bili: x / Urobili: x   Blood: x / Protein: x / Nitrite: x   Leuk Esterase: x / RBC: x / WBC x   Sq Epi: x / Non Sq Epi: x / Bacteria: x                              9.5    9.15  )-----------( 281      ( 07 May 2024 07:07 )             30.2                         9.7    11.26 )-----------( 266      ( 06 May 2024 06:37 )             30.7                         9.3    10.45 )-----------( 226      ( 05 May 2024 06:57 )             29.0     CAPILLARY BLOOD GLUCOSE            MICROBIOLOGY:       RADIOLOGY:     < from: CT Angio Chest PE Protocol w/ IV Cont (24 @ 00:33) >  MPRESSION:  Findings concerning for perforation of the fundal wall of the   gallbladder, communicating with a small amount of pericholecystic fluid,   and with a3.6 cm in greatest dimension complex cystic lesion in the   overlying hepatic parenchyma most likely an abscess.    The presence of gallstones and edematous wall thickening of the   gallbladder suggests that cholecystitis is the most likely cause forthis   perforation. Neoplasm less likely but possible.    Other smaller hepatic hypodensities may represent incidental cysts   although, given the probable carcinomatosis and right ovarian lesion   described below, metastases are also possible.    MRI abdomen with and without gadolinium and with MRCP would more   definitively image these gallbladder and hepatic abnormalities. Given the   patient's body habitus, ultrasound would probably not provide much more   information.    Several soft tissue density nodules in the omentum and peritoneal fat   most likely represents carcinomatosis. The most likely source is right   ovarian neoplasm; the right ovary is not identified separate from   numerous soft tissue and smaller calcific densities in theright adnexa   with surrounding ascitic fluid.    GYN protocol MRI pelvis with and without gadolinium would best evaluate   for right ovarian neoplasm.    Moderate volume simple density ascites.    No pulmonary embolism.    Small left and trace rightpleural effusions.      < end of copied text >  < from: CT Angio Chest PE Protocol w/ IV Cont (24 @ 00:33) >  CHEST:  LUNGS AND LARGE AIRWAYS: Patent central airways. No pneumonia or edema.   Dependent atelectatic changes most prominent posterior aspect on the left.  PLEURA: Trace right and small left-sided pleural effusion.  VESSELS: No pulmonary embolus. No thoracic aortic aneurysm.   Atherosclerosis including of the coronary arteries.  HEART: Mild cardiomegaly. No pericardial effusion. Mitral annular   calcifications.  MEDIASTINUM AND BRANDEN: No lymphadenopathy.  CHEST WALL AND LOWER NECK: Unremarkable.    < end of copied text >

## 2024-05-07 NOTE — OCCUPATIONAL THERAPY INITIAL EVALUATION ADULT - PERTINENT HX OF CURRENT PROBLEM, REHAB EVAL
83 female history of COPD not on O2, morbid obesity, HTN, CHF, BCC, hypothyroidism, psoriatic arthritis, SUZIE, currently on abx for presumed PNA, BIBEMS for acute onset SOB.  Pt admitted from [facility] where she is long term resident, and she has had increased work of breathing for 1 day.  Per ED report, patient was diagnosed recently with left-sided pneumonia, receiving IV zosyn and IVF at the facility.  She reports weakness along with emesis around 5 days ago improved though she still reports poor appetite. She also reports right-sided abdominal pain and a dry cough which she suspects is related to allergies. Denies CP, sore throat, nausea, urinary/bowel changes except constipation, numbness/tingling, leg swelling. In the ED, patient found to be febrile to 38.4, HR 94, maintained on NC, given CTX, APAP, Nebs, with urine and blood cultures sent.   CT ABDOMEN/CHEST: Findings concerning for perforation of the fundal wall of the gallbladder, communicating with a small amount of pericholecystic fluid, and with a 3.6 cm in greatest dimension complex cystic lesion in the overlying hepatic parenchyma most likely an abscess.The presence of gallstones and edematous wall thickening of the gallbladder suggests that cholecystitis is the most likely cause for this perforation. Neoplasm less likely but possible.Other smaller hepatic hypodensities may represent incidental cysts although, given the probable carcinomatosis and right ovarian lesion described below, metastases are also possible.MRI abdomen with and without gadolinium and with MRCP would more definitively image these gallbladder and hepatic abnormalities. Given the   patient's body habitus, ultrasound would probably not provide much more information.Several soft tissue density nodules in the omentum and peritoneal fat most likely represents carcinomatosis. The most likely source is right   ovarian neoplasm; the right ovary is not identified separate from numerous soft tissue and smaller calcific densities in the right adnexa with surrounding ascitic fluid.GYN protocol MRI pelvis with and without gadolinium would best evaluate for right ovarian neoplasm.Moderate volume simple density ascites.No pulmonary embolism.  Small left and trace right pleural effusions.MRI: A 5.3 cm LEFT ovarian/adnexal mass with multiple omental and peritoneal deposits throughout the abdomen and pelvis, highly suspicious for primary gynecologic malignancy with peritoneal and omental carcinomatosis. Recommend gynecologic/oncologic consult and consider tissue sampling for confirmation.Cholelithiasis with discontinuity of the inflamed gallbladder wall contiguous with a 4.3 cm multiloculated collection in the adjacent liver in segment 5, most compatible with perforated cholecystitis with intrahepatic abscess. Given additional MR findings highly suspicious for malignancy, an underlying malignant/metastatic liver lesion cannot be entirely excluded. Multiple serosal implants/tumor deposits along the Surface of the liver.Choledocholithiasis resulting in biliary ductal dilatation.Endometrium is distended to 1.4 cm with nonenhancing T1 hyperintense material, favoring hemorrhagic debris versus less likely underlying lesion.  NM:  Normal morphine-augmented hepatobiliary scan.No evidence of acute cholecystitis or biliary obstruction.

## 2024-05-07 NOTE — PROGRESS NOTE ADULT - ASSESSMENT
83 female history of COPD not on O2, obesity, HTN, CHF, BCC, hypothyroidism, psoriatic arthritis SUZIE BIBEMS for acute onset SOB, admitted for sepsis secondary to perforated gallbladder s/p perc dimple 5/3. Also found to have complex cystic liver lesion and new left ovarian neoplasm w/ carcinomatosi, pending outpatient IR tissue biopsy. Hospital course complicated w/ AHRF likely 2/2 to COPD exacerbation.

## 2024-05-07 NOTE — ADVANCED PRACTICE NURSE CONSULT - REASON FOR CONSULT
Vascular Access Team    Evaluation for: Bedside SL PICC placement  Requested by name: Alejandra Mota  Date/Time: 5/7 @11:47    Indication: 84 y/o F found to have perforated gallbladder and hepatic abscess s/p perc dimple w/ IR, needs IV abx for 6 weeks  Allergy to CHG or Heparin or Lidocaine: no    Platelets(>20): 281  INR(<3): 1.28  eGFR(>40): 94  Blood cultures sent: yes 5/3  Blood culture negative in 48hrs: NG x 4 days  Anticoagulants: no  Arms DVT: no  Mastectomy: no  Fistula: no  PPM/Defib: no  IR or Nephrology or ID clearance needed: no    Consent obtained: no    Pending: consent     Plan: Bedside picc order evaluated. Please obtain PICC placement consent and then call [phone] for the VAT RN to place the bedside picc.

## 2024-05-07 NOTE — PROGRESS NOTE ADULT - ASSESSMENT
Impression:   83 female history of COPD not on O2, obesity, HTN, CHF, BCC, hypothyroidism, psoriatic arthritis SUZIE BIBEMS for acute onset SOB and found to have sepsis secondary to perforated gallbladder with MRI/MRCP noting a 5.3 cm LEFT ovarian/adnexal mass with multiple omental and peritoneal deposits throughout the abdomen and pelvis, highly suspicious for primary gynecologic malignancy with peritoneal and omental carcinomatosis; cholelithiasis with discontinuity of the inflamed gallbladder wall contiguous with a 4.3 cm multiloculated collection in the adjacent liver in segment 5, most compatible with perforated cholecystitis with intrahepatic abscess and Choledocholithiasis resulting in biliary ductal dilatation prompting GI consult.     #Perforated gallbladder with 4.3 cm multiloculated collection in the adjacent liver in segment 5  - s/p IR drainage - fluid was positive for ESBL, currently on meropenem. Blood Cultures NGTD.     #CBD dilation w/ choledocholithiasis  Will need ERCP later this week when she is medically optimized for the procedure.     #LEFT ovarian/adnexal mass with multiple omental and peritoneal deposits throughout the abdomen and pelvis, highly suspicious for primary   gynecologic malignancy with peritoneal and omental carcinomatosis - planning for o/p biopsy.     #Acute hypoxic respiratory failure 2/2 COPD  - currently on steroids started on 5/4 - oxygen requirement is improving.     Recommendations:  - will plan for potential ERCP on 5/9 depending on her clinical status, still requiring O2 and has significant SOB with movement, high risk for anesthesia and will need general anesthesia for the procedure.   - Patient will need pulmonary clearance   - Continue with abx, appreciate ID recs.   - CBC and CMP daily.     GI will continue to follow.     All recommendations are tentative until note is attested by an attending.     Ale French, PGY-5  Gastroenterology/Hepatology Fellow  Available on [employer] Teams  [phone] (Short Range Pager)  [phone] (Long Range Pager)    After 5pm, please contact the on-call GI fellow.

## 2024-05-07 NOTE — PROGRESS NOTE ADULT - PROBLEM SELECTOR PLAN 8
DVT PPx: Lovenox 40 mg qd  Diet: regular  Bowel Regimen: Miralax/Senna  Code: DNR/DNI w/ trial of intubation   Dispo: Pending Hospital Course DVT PPx: Lovenox 40 mg qd  Diet: regular  Bowel Regimen: Miralax/Senna  Code: DNR/DNI w/ trial of intubation   Dispo: pending ERCP

## 2024-05-07 NOTE — CONSULT NOTE ADULT - CONSULT REASON
tissue biopsy for new ovarian mass w/ mets
possible gallbladder perforation/acute cholecystitis
Ovarian mass
possible perforated cholecystitis/abscess on CT
CBD stone
Preoperative Clearance

## 2024-05-07 NOTE — PROGRESS NOTE ADULT - SUBJECTIVE AND OBJECTIVE BOX
[facility]  Division of Infectious Diseases  [phone]    Name: JUSTIN KEYES  Age: 83y  Gender: Female  MRN: [id number]    Interval History--  Notes reviewed.     Past Medical History--  Hypothyroid    Chronic bronchitis    Psoriatic arthritis    Osteoarthritis    Overactive bladder    Basal cell carcinoma    Squamous cell carcinoma    History of carpal tunnel release    H/O total shoulder replacement, left    H/O:         For details regarding the patient's social history, family history, and other miscellaneous elements, please refer the initial infectious diseases consultation and/or the admitting history and physical examination for this admission.    Allergies    latex (Rash)  erythromycin (Rash)  phenobarbital (Rash)    Intolerances    Milk (Rash)      Medications--  Antibiotics:  ampicillin  IVPB 2 Gram(s) IV Intermittent every 6 hours  ertapenem  IVPB 1000 milliGRAM(s) IV Intermittent every 24 hours    Immunologic:    Other:  acetaminophen     Tablet .. PRN  albuterol/ipratropium for Nebulization  bisacodyl  buDESOnide    Inhalation Suspension  chlorhexidine 4% Liquid  gabapentin  levothyroxine  loratadine  mupirocin 2% Nasal  nystatin Powder  oxyCODONE    IR PRN  polyethylene glycol 3350  potassium phosphate / sodium phosphate Powder (PHOS-NaK)  predniSONE   Tablet  senna      Review of Systems--  A 10-point review of systems was obtained.     Pertinent positives and negatives--  Constitutional: No fevers. No Chills. No Rigors.   Cardiovascular: No chest pain. No palpitations.  Respiratory: No shortness of breath. No cough.  Gastrointestinal: No nausea or vomiting. No diarrhea or constipation.   Psychiatric: Pleasant. Appropriate affect.    Review of systems otherwise negative except as previously noted.    Physical Examination--  Vital Signs: T(F): 97.4 (24 @ 04:15), Max: 97.9 (24 @ 12:00)  HR: 71 (24 @ 08:37)  BP: 115/59 (24 @ 04:15)  RR: 18 (24 @ 04:15)  SpO2: 94% (24 @ 08:37)  Wt(kg): --  General: Nontoxic-appearing Female in no acute distress.  HEENT: AT/NC. PERRL. EOMI. Anicteric. Conjunctiva pink and moist. Oropharynx clear. Dentition fair.  Neck: Not rigid. No sense of mass.  Nodes: None palpable.  Lungs: Clear bilaterally without rales, wheezing or rhonchi  Heart: Regular rate and rhythm. No Murmur. No rub. No gallop. No palpable thrill.  Abdomen: Bowel sounds present and normoactive. Soft. Nondistended. Nontender. No sense of mass. No organomegaly.  Back: No spinal tenderness. No costovertebral angle tenderness.   Extremities: No cyanosis or clubbing. No edema.   Skin: Warm. Dry. Good turgor. No rash. No vasculitic stigmata.  Psychiatric: Appropriate affect and mood for situation.         Laboratory Studies--  CBC                        9.5    9.15  )-----------( 281      ( 07 May 2024 07:07 )             30.2       Chemistries      139  |  99  |  13  ----------------------------<  134<H>  4.0   |  29  |  0.50    Ca    9.0      07 May 2024 07:07  Phos  2.4       Mg     2.3     -07    TPro  6.2  /  Alb  3.0<L>  /  TBili  0.2  /  DBili  x   /  AST  17  /  ALT  20  /  AlkPhos  77  05-07      Culture Data    Culture - Body Fluid with Gram Stain (collected 03 May 2024 17:11)  Source: Bile Bile Fluid  Gram Stain (04 May 2024 06:18):    polymorphonuclear leukocytes seen    Gram Negative Rods seen    by cytocentrifuge  Preliminary Report (05 May 2024 17:20):    Moderate Escherichia coli ESBL    Few Enterococcus faecium  Organism: Escherichia coli ESBL  Enterococcus faecium (05 May 2024 17:20)  Organism: Escherichia coli ESBL (05 May 2024 17:20)  Organism: Enterococcus faecium (05 May 2024 17:20)    Culture - Blood (collected 03 May 2024 01:08)  Source: .Blood Blood-Peripheral  Preliminary Report (07 May 2024 05:00):    No growth at 4 days    Culture - Blood (collected 03 May 2024 01:08)  Source: .Blood Blood-Peripheral  Preliminary Report (07 May 2024 05:00):    No growth at 4 days    Culture - Urine (collected 2024 23:24)  Source: Clean Catch Clean Catch (Midstream)  Final Report (02 May 2024 00:54):    <10,000 CFU/mL Normal Urogenital Ijeoma    Culture - Blood (collected 2024 21:17)  Source: .Blood Blood-Peripheral  Final Report (06 May 2024 01:01):    No growth at 5 days    Culture - Blood (collected 2024 21:00)  Source: .Blood Blood-Peripheral  Final Report (06 May 2024 01:01):    No growth at 5 days             [facility]  Division of Infectious Diseases  [phone]    Name: JUSTIN KEYES  Age: 83y  Gender: Female  MRN: [id number]    Interval History--  Notes reviewed. Seen earlier today. No new complaints. Denies pain. No fevers, chills, or rigors.      Past Medical History--  Hypothyroid    Chronic bronchitis    Psoriatic arthritis    Osteoarthritis    Overactive bladder    Basal cell carcinoma    Squamous cell carcinoma    History of carpal tunnel release    H/O total shoulder replacement, left    H/O:         For details regarding the patient's social history, family history, and other miscellaneous elements, please refer the initial infectious diseases consultation and/or the admitting history and physical examination for this admission.    Allergies    latex (Rash)  erythromycin (Rash)  phenobarbital (Rash)    Intolerances    Milk (Rash)      Medications--  Antibiotics:  ampicillin  IVPB 2 Gram(s) IV Intermittent every 6 hours  ertapenem  IVPB 1000 milliGRAM(s) IV Intermittent every 24 hours    Immunologic:    Other:  acetaminophen     Tablet .. PRN  albuterol/ipratropium for Nebulization  bisacodyl  buDESOnide    Inhalation Suspension  chlorhexidine 4% Liquid  gabapentin  levothyroxine  loratadine  mupirocin 2% Nasal  nystatin Powder  oxyCODONE    IR PRN  polyethylene glycol 3350  potassium phosphate / sodium phosphate Powder (PHOS-NaK)  predniSONE   Tablet  senna      Review of Systems--  A 10-point review of systems was obtained.   Review of systems otherwise negative except as previously noted.    Physical Examination--  Vital Signs: T(F): 97.4 (24 @ 04:15), Max: 97.9 (24 @ 12:00)  HR: 71 (24 @ 08:37)  BP: 115/59 (24 @ 04:15)  RR: 18 (24 @ 04:15)  SpO2: 94% (24 @ 08:37)  Wt(kg): --  General: Nontoxic-appearing Female in no acute distress.  HEENT: AT/NC. Anicteric. Conjunctiva pink and moist. Oropharynx clear.  Neck: Not rigid. No sense of mass.  Nodes: None palpable.  Lungs: Diminished BS B no RWR  Heart: Regular rate and rhythm.   Abdomen: Bowel sounds present and normoactive. Soft. Nondistended. NT.   Extremities: No cyanosis or clubbing. No edema. Venous insuffciency changes LE B.  Skin: Warm. Dry. Good turgor. No vasculitic stigmata.  Psychiatric: Appropriate affect and mood for situation.       Laboratory Studies--  CBC                        9.5    9.15  )-----------( 281      ( 07 May 2024 07:07 )             30.2       Chemistries      139  |  99  |  13  ----------------------------<  134<H>  4.0   |  29  |  0.50    Ca    9.0      07 May 2024 07:07  Phos  2.4     05-  Mg     2.3     05-07    TPro  6.2  /  Alb  3.0<L>  /  TBili  0.2  /  DBili  x   /  AST  17  /  ALT  20  /  AlkPhos  77  05-07      Culture Data    Culture - Body Fluid with Gram Stain (collected 03 May 2024 17:11)  Source: Bile Bile Fluid  Gram Stain (04 May 2024 06:18):    polymorphonuclear leukocytes seen    Gram Negative Rods seen    by cytocentrifuge  Preliminary Report (05 May 2024 17:20):    Moderate Escherichia coli ESBL    Few Enterococcus faecium  Organism: Escherichia coli ESBL  Enterococcus faecium (05 May 2024 17:20)  Organism: Escherichia coli ESBL (05 May 2024 17:20)  Organism: Enterococcus faecium (05 May 2024 17:20)    Culture - Blood (collected 03 May 2024 01:08)  Source: .Blood Blood-Peripheral  Preliminary Report (07 May 2024 05:00):    No growth at 4 days    Culture - Blood (collected 03 May 2024 01:08)  Source: .Blood Blood-Peripheral  Preliminary Report (07 May 2024 05:00):    No growth at 4 days    Culture - Urine (collected 2024 23:24)  Source: Clean Catch Clean Catch (Midstream)  Final Report (02 May 2024 00:54):    <10,000 CFU/mL Normal Urogenital Ijeoma    Culture - Blood (collected 2024 21:17)  Source: .Blood Blood-Peripheral  Final Report (06 May 2024 01:01):    No growth at 5 days    Culture - Blood (collected 2024 21:00)  Source: .Blood Blood-Peripheral  Final Report (06 May 2024 01:01):    No growth at 5 days

## 2024-05-07 NOTE — OCCUPATIONAL THERAPY INITIAL EVALUATION ADULT - LIVES WITH, PROFILE
PTA pt reports that she was a resident off a LTC facility where she required assist with bathing , (I) with dressing using assisted devices( hip hit) and amb using RW/ rollator.

## 2024-05-07 NOTE — CONSULT NOTE ADULT - CONSULT REQUESTED DATE/TIME
07-May-2024 14:38
01-May-2024 06:14
03-May-2024 13:02
01-May-2024 02:59
03-May-2024 13:42
03-May-2024 10:34

## 2024-05-07 NOTE — PROGRESS NOTE ADULT - PROBLEM SELECTOR PLAN 2
Pt previously treated at [facility] for pneumonia. Also w/ hx of COPD not on any medications or home medications. Suspect AHRF 2/2 to COPD exacerbation.  Improving    - duonebs  - budesonide  - started prednisone 40mg qd (5/4- ) for 5 days  - continue azithromycin (5/4- 5/6) x 3 days  - wean oxygen as tolerated  - CPAP at night for SUZIE Pt previously treated at [facility] for pneumonia. Also w/ hx of COPD not on any medications or home medications. Suspect AHRF 2/2 to COPD exacerbation.  s/p azithromycin   Improving    - continue duonebs, budesonide  - continue prednisone 40mg qd (5/4- ) for 5 days  - wean oxygen as tolerated  - CPAP at night for SUZIE

## 2024-05-07 NOTE — CONSULT NOTE ADULT - REASON FOR ADMISSION
Sepsis, perforated gallbladder, sob, poss metastatic Ovarian CA
Sepsis, perforated gallbladder, metastatic Ovarian CA
Sepsis, perforated gallbladder, sob, poss metastatic Ovarian CA

## 2024-05-07 NOTE — CONSULT NOTE ADULT - ASSESSMENT
83 female history of COPD not on O2, morbid obesity, HTN, CHF, BCC, hypothyroidism, psoriatic arthritis, SUZIE, currently on abx for presumed PNA, BIBEMS for acute onset SOB and right sided     Pt notes having some dyspnea, dry cough and wheezing when presenting to the hospital. She reports that is much improved.     S/p Perc Mary Carmen by IR, growing ESBL E. Coli. Also found to have choleducocholelithiasis for which pt is planned for an ERCP.      Pulmonology Consulted for pre operative clearance.    # Preoperative Clearance  - Pt likely with viral uri on presentation based on symptomatology  - Has improved significantly while in the hospital  - Now able to be taken off of oxygen   - Has Hx SUZIE, Obesity.   - Would get TTE and BNP for completeness of workup, but does not need to delay her procedure.   - Questionable hx of COPD in chart but pt never smoker.   - Would recommend duonebs pre intubation and extubation.   - Would recommend extubation to CPAP with 10 cm h20.   - Pt is optimized from the pulmonary perspective. She has elevated risk given age, SUZIE, obesity, recent URI and oxygen use.  - ARISCAT score is 57. High Risk - 42.1% risk of in-hospital post-op pulmonary complications (composite including respiratory failure, respiratory infection, pleural effusion, atelectasis, pneumothorax, bronchospasm, aspiration pneumonitis)    # SUZIE  - C/w CPAP at 10 cmh20 nightly.     # Lung Care  - Titrate oxygen to >90%   - Use humidified oxygen  - out of bed to chair and early ambulation as able  - Incentive spirometry  - physical therapy  - Aspiration precautions  - DVT ppx  83 female history of COPD not on O2, morbid obesity, HTN, CHF, BCC, hypothyroidism, psoriatic arthritis, SUZIE, currently on abx for presumed PNA, BIBEMS for acute onset SOB and right sided     Pt notes having some dyspnea, dry cough and wheezing when presenting to the hospital. She reports that is much improved.     S/p Perc Mary Carmen by IR, growing ESBL E. Coli. Also found to have choleducocholelithiasis for which pt is planned for an ERCP.      Pulmonology Consulted for pre operative clearance.    # Preoperative Clearance  - Pt likely with viral uri on presentation based on symptomatology  - Has improved significantly while in the hospital  - Now able to be taken off of oxygen   - Has Hx SUZIE, Obesity.   - Would get TTE and BNP for completeness of workup, but does not need to delay her procedure.   - Questionable hx of COPD in chart but pt never smoker.   - Would recommend duonebs pre intubation and extubation.   - Would recommend extubation to CPAP with 10 cm h20.   - Pt is optimized from the pulmonary perspective. She has elevated risk given age, SUZIE, obesity, recent URI and oxygen use.  - ARISCAT score is 57. High Risk - 42.1% risk of in-hospital post-op pulmonary complications (composite including respiratory failure, respiratory infection, pleural effusion, atelectasis, pneumothorax, bronchospasm, aspiration pneumonitis)    # SUZIE  - C/w CPAP at 10 cmh20 nightly.     # Lung Care  - Titrate oxygen to >90%   - Use humidified oxygen  - out of bed to chair and early ambulation as able  - Incentive spirometry  - physical therapy  - Aspiration precautions  - DVT ppx     Pulmonary will sign off. Please call back with any questions    Pt should follow up with her pulmonologist Dr. Mckeon within 2 weeks of discharge.

## 2024-05-07 NOTE — OCCUPATIONAL THERAPY INITIAL EVALUATION ADULT - ADL RETRAINING, OT EVAL
GOAL: Pt will be independent with lower body dressing using dressing kit while seated EOB in 4 weeks.

## 2024-05-07 NOTE — CONSULT NOTE ADULT - ATTENDING COMMENTS
84 y/o F w/hx as above including morbid obesity, SUZIE, possible OHS 84 y/o F w/hx as above including morbid obesity and SUZIE and CHF unknown EF admitted for acute cholecystitis s/p perc dimple also found to have choleducolythiasis now awaiting ERCP. Patient with mild hypoxemia likely secondary to atelectasis and possible pulmonary edema. Patient is at increased risk of perioperative pulmonary complications, however this in no pulmonary contraindication for planned ERCP and patient is medically optimized form a pulmonary perspective.    - Supplemental O2 as needed goal O2 sat >= 90%  - CPAP at night  - OOB to chair, incentive spirometry  - Diuresis goal net negative  - Consider extubation to BiPAP following ERCP

## 2024-05-07 NOTE — PROGRESS NOTE ADULT - SUBJECTIVE AND OBJECTIVE BOX
*******************************  Alejandra Mota MD (PGY-1)  Internal Medicine  Contact via [employer] TEAMS  *******************************    JUSTIN KEYES  83y  Female    Patient is a 83y old  Female who presents with a chief complaint of Sepsis, perforated gallbladder, sob, poss metastatic Ovarian CA (06 May 2024 16:51)      Subjective:    Objective:  T(C): 36.3 (05-07-24 @ 04:15), Max: 36.6 (05-06-24 @ 12:00)  HR: 67 (05-07-24 @ 04:15) (67 - 86)  BP: 115/59 (05-07-24 @ 04:15) (100/66 - 121/64)  RR: 18 (05-07-24 @ 04:15) (18 - 20)  SpO2: 96% (05-07-24 @ 04:15) (92% - 98%)  I&O's Summary    06 May 2024 07:01  -  07 May 2024 07:00  --------------------------------------------------------  IN: 340 mL / OUT: 1319.5 mL / NET: -979.5 mL        PHYSICAL EXAM:  GENERAL: NAD  HEAD:  Atraumatic, Normocephalic  EYES: EOMI, PERRLA, conjunctiva and sclera clear  ENMT: Moist mucous membranes  NECK: Supple, No JVD, trachea midline   NERVOUS SYSTEM:  Alert & Oriented X3, Good concentration; Motor Strength 5/5 B/L upper and lower extremities; DTRs 2+ intact and symmetric  CHEST/LUNG: Clear to auscultation bilaterally; No rales, rhonchi, wheezing, or rubs  HEART: Regular rate and rhythm; No murmurs, rubs, or gallops  ABDOMEN: Soft, Nontender, Nondistended; Bowel sounds present  EXTREMITIES:  2+ Peripheral Pulses, No clubbing, cyanosis, or edema  LYMPH: No lymphadenopathy noted  SKIN: No rashes or lesions    MEDICATIONS  (STANDING):  albuterol/ipratropium for Nebulization 3 milliLiter(s) Nebulizer every 6 hours  ampicillin  IVPB 2 Gram(s) IV Intermittent every 6 hours  bisacodyl 5 milliGRAM(s) Oral at bedtime  buDESOnide    Inhalation Suspension 0.25 milliGRAM(s) Inhalation two times a day  chlorhexidine 4% Liquid 1 Application(s) Topical daily  ertapenem  IVPB 1000 milliGRAM(s) IV Intermittent every 24 hours  gabapentin 800 milliGRAM(s) Oral every 12 hours  levothyroxine 50 MICROGram(s) Oral daily  loratadine 10 milliGRAM(s) Oral daily  mupirocin 2% Nasal 1 Application(s) Both Nostrils two times a day  nystatin Powder 1 Application(s) Topical three times a day  polyethylene glycol 3350 17 Gram(s) Oral daily  predniSONE   Tablet 40 milliGRAM(s) Oral daily  senna 2 Tablet(s) Oral at bedtime    MEDICATIONS  (PRN):  acetaminophen     Tablet .. 650 milliGRAM(s) Oral every 6 hours PRN Temp greater or equal to 38C (100.4F), Mild Pain (1 - 3)  oxyCODONE    IR 5 milliGRAM(s) Oral every 4 hours PRN Moderate Pain (4 - 6)      LABS:        CAPILLARY BLOOD GLUCOSE          RADIOLOGY & ADDITIONAL TESTS:               *******************************  Alejandra Mota MD (PGY-1)  Internal Medicine  Contact via [employer] TEAMS  *******************************    JUSTIN KEYES  83y  Female    Patient is a 83y old  Female who presents with a chief complaint of Sepsis, perforated gallbladder, sob, poss metastatic Ovarian CA (06 May 2024 16:51)    Subjective: No acute events overnight. Seen at bedside this morning. Noted improvement in breathing. Denied any fever, chills, chest pain, sob, ab pain, n/v.     Objective:  T(C): 36.3 (05-07-24 @ 04:15), Max: 36.6 (05-06-24 @ 12:00)  HR: 67 (05-07-24 @ 04:15) (67 - 86)  BP: 115/59 (05-07-24 @ 04:15) (100/66 - 121/64)  RR: 18 (05-07-24 @ 04:15) (18 - 20)  SpO2: 96% (05-07-24 @ 04:15) (92% - 98%)  I&O's Summary    06 May 2024 07:01  -  07 May 2024 07:00  --------------------------------------------------------  IN: 340 mL / OUT: 1319.5 mL / NET: -979.5 mL    PHYSICAL EXAM:  GENERAL: NAD, obese   HEAD:  Atraumatic, Normocephalic  EYES: EOMI, PERRLA, conjunctiva and sclera clear  ENMT: Moist mucous membranes  NECK: Supple, No JVD, trachea midline   NERVOUS SYSTEM: Alert & Oriented X3, Good concentration  CHEST/LUNG: Clear to auscultation bilaterally; No rales, rhonchi, wheezing, or rubs  HEART: Regular rate and rhythm; No murmurs, rubs, or gallops  ABDOMEN: Soft, Nontender, Nondistended; drain in place bowel sounds present  EXTREMITIES:  2+ Peripheral Pulses, No clubbing, cyanosis, or edema  SKIN: No rashes or lesions    MEDICATIONS  (STANDING):  albuterol/ipratropium for Nebulization 3 milliLiter(s) Nebulizer every 6 hours  ampicillin  IVPB 2 Gram(s) IV Intermittent every 6 hours  bisacodyl 5 milliGRAM(s) Oral at bedtime  buDESOnide    Inhalation Suspension 0.25 milliGRAM(s) Inhalation two times a day  chlorhexidine 4% Liquid 1 Application(s) Topical daily  ertapenem  IVPB 1000 milliGRAM(s) IV Intermittent every 24 hours  gabapentin 800 milliGRAM(s) Oral every 12 hours  levothyroxine 50 MICROGram(s) Oral daily  loratadine 10 milliGRAM(s) Oral daily  mupirocin 2% Nasal 1 Application(s) Both Nostrils two times a day  nystatin Powder 1 Application(s) Topical three times a day  polyethylene glycol 3350 17 Gram(s) Oral daily  predniSONE   Tablet 40 milliGRAM(s) Oral daily  senna 2 Tablet(s) Oral at bedtime    MEDICATIONS  (PRN):  acetaminophen     Tablet .. 650 milliGRAM(s) Oral every 6 hours PRN Temp greater or equal to 38C (100.4F), Mild Pain (1 - 3)  oxyCODONE    IR 5 milliGRAM(s) Oral every 4 hours PRN Moderate Pain (4 - 6)    LABS:             9.5    9.15  )-----------( 281      ( 07 May 2024 07:07 )             30.2     05-07    139  |  99  |  13  ----------------------------<  134<H>  4.0   |  29  |  0.50    Ca    9.0      07 May 2024 07:07  Phos  2.4     05-07  Mg     2.3     05-07    TPro  6.2  /  Alb  3.0<L>  /  TBili  0.2  /  DBili  x   /  AST  17  /  ALT  20  /  AlkPhos  77  05-07     Culture Results:   Moderate Escherichia coli ESBL  Few Enterococcus faecium (05-03 @ 17:11)  Culture Results:   No growth at 4 days (05-03 @ 01:08)  Culture Results:   No growth at 4 days (05-03 @ 01:08)  Culture Results:   <10,000 CFU/mL Normal Urogenital Ijeoma (04-30 @ 23:24)  Culture Results:   No growth at 5 days (04-30 @ 21:17)  Culture Results:   No growth at 5 days (04-30 @ 21:00)    RADIOLOGY & ADDITIONAL TESTS:

## 2024-05-07 NOTE — OCCUPATIONAL THERAPY INITIAL EVALUATION ADULT - RANGE OF MOTION EXAMINATION, LOWER EXTREMITY
Pt informed Rx and information ready for pick-up.
stated
bilateral LE Active ROM was WFL  (within functional limits)

## 2024-05-07 NOTE — OCCUPATIONAL THERAPY INITIAL EVALUATION ADULT - BALANCE TRAINING, PT EVAL
GOAL: Pt will demonstrate improved standing  balance one grade  while completing grooming task at sink independently in 4 weeks.

## 2024-05-07 NOTE — PROGRESS NOTE ADULT - SUBJECTIVE AND OBJECTIVE BOX
Interventional Radiology Follow-Up Note    This is a 83y Female s/p percutaneous cholecystostomy tube placement on 5/3 in Interventional Radiology with Dr. Madden.     S: Patient seen and examined @ bedside earlier this morning. No complaints offered. Denies abdominal pain.     Medication:   ampicillin  IVPB: (05-07)  azithromycin  IVPB: (05-06)  ertapenem  IVPB: (05-06)  meropenem  IVPB: (05-06)    Vitals:  T(F): 97.7, Max: 97.7 (13:37)  HR: 75  BP: 111/71  RR: 18  SpO2: 95%    Physical Exam:  General: Nontoxic, in NAD, A&O x3.  Abdomen: soft, NTND, no peritoneal signs.  Drain Device: Drain intact attached to bag with slightly thick, cloudy bile. Dressing clean, dry, intact. Drain flushed with 5cc NS w/o issues, +fluid return noted in tubing. No pericatheter leakage.     LABS:  WBC 9.15 / Hgb 9.5 / Hct 30.2 / Plt 281  Na 139 / K 4.0 / CO2 29 / Cl 99 / BUN 13 / Cr 0.50 / Glucose 134  ALT 20 / AST 17 / Alk Phos 77 / Tbili 0.2  Ptt -- / Pt -- / INR --      24hr Drain output: 69cc    Assessment/Plan:  83 female history of COPD not on O2, obesity, HTN, CHF, BCC, hypothyroidism, psoriatic arthritis SUZIE BIBEMS for acute onset SOB. 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 Moderate volume ascites. Trace left pleural effusion. Patient is currently s/p percutaneous cholecystostomy tube placement on 5/3 in Interventional Radiology with Dr. Madden.       -VSS  -wbc downtrending 10.45-->11.26--> 9.15: continue to trend  -Choledocholithiasis pending ERCP this week  -trend vs/labs  -flush drain with 5cc NS daily forward only; DO NOT aspirate  -change dressing q3 days or when dressing is saturated  -regarding outpatient follow up with IR, if the patient is d/c home with drainage catheter they can make an appointment with IR by calling the IR booking office at [phone]; recommend IR follow in 8wks for tube evaluation/ exchange.  -they will benefit from VNS service to help with drainage catheter care; they should continue same drainage catheter care as an outpatient.  -continue global management per primary team   -Please call IR at extension [phone] with any questions, concerns, or issues regarding above.        Teresita Weiner PA-C  Available on teams

## 2024-05-07 NOTE — PROGRESS NOTE ADULT - ASSESSMENT
Perforated gallbladder  Liver lesion, probably abscess related to adjacent perforated GB  Absence of tracer on the NM SPECT suggests lack of active connection between liver lesion and GB.  Choledocholithiasis with CBD dilatation, ERCP planned  Would question whether this process actually began back in February  Peritoneal studding worrisome for carcinomatosis in the setting of ovarian mass, bx deferred  ?COPD. Lung fields quiet at present. Steroids make her abdominal exam and leukocytosis harder to interpret, and would hope to limit their use in the setting of active infection.     5/7: Essentially unchanged. Benign abdomen but still on steroids which could be mask intra-abdominal inflammation. WBC down today. Antibiotics altered, after review with ID pharmacy team odds that isolate would be sensitive to imipenem seem remote based on relatively high TAMELA to PCN with E. faecium. Amount of drainage seems scant.     Suggestions--  Plan for 6-week total course of antibiotics  Continue Ertapenem 1g qday   Continue Ampicillin 2g IVPB q6h  Await ERCP  Decrease steroids as able  Eventual interval imaging to assess impact of antibiotics and GB drainage on lesion- if not improving may need dedicated drainage  Trend CBC, CMP  Serial abdominal exams  Monitor drain output    Vincent Skinner MD  Attending Physician  [facility]  Division of Infectious Diseases  [phone]

## 2024-05-07 NOTE — PROGRESS NOTE ADULT - PROBLEM SELECTOR PLAN 5
Met sepsis criteria w/ leukocytosis, tachypnea, fever and tachycardia. Likely secondary to perforated gallbladder seen on CT scan. MRCP findings suggestive of perforated cholecystitis with intrahepatic abscess. U/A, RVP negative. CTA neg for PE. Blood and urine cultures negative.   Improved.     - continue w/ abx   - trend WBC and fever curve Met sepsis criteria w/ leukocytosis, tachypnea, fever and tachycardia. Likely secondary to perforated gallbladder seen on CT scan. MRCP findings suggestive of perforated cholecystitis with intrahepatic abscess. U/A, RVP negative. CTA neg for PE. Blood and urine cultures negative.   Resolved     - continue w/ abx   - trend WBC and fever curve

## 2024-05-07 NOTE — PROGRESS NOTE ADULT - PROBLEM SELECTOR PLAN 1
CT findings c/f cholelithiasis with perforation and possible abscess. HIDA scan negative. MRCP shows   Choledocholithiasis resulting in biliary ductal dilatation. Also w/ cholelithiasis with discontinuity of the inflamed gallbladder wall contiguous with a 4.3 cm multiloculated collection that is most compatible with perforated cholecystitis with intrahepatic abscess.   s/p perc dimple w/ IR 5/3. bile fluid culture w/ moderate ESBL E coli and few enterococcus faecium.   s/p zosyn 5/1-5/5    - monitor drain output   - ID consulted, appreciate recs - abx regimen and duration  - continue meropenam (5/5 - ) for now pending further recs CT findings c/f cholelithiasis with perforation and possible abscess. HIDA scan negative. MRCP shows   Choledocholithiasis resulting in biliary ductal dilatation. Also w/ cholelithiasis with discontinuity of the inflamed gallbladder wall contiguous with a 4.3 cm multiloculated collection that is most compatible with perforated cholecystitis with intrahepatic abscess.   s/p perc dimple w/ IR 5/3. bile fluid culture w/ moderate ESBL E coli and few enterococcus faecium.   s/p zosyn 5/1-5/5, meropenem (5/5-6)     - continue ampicillin and ertapenam (5/6- ) for extended course, will need PICC line placement prior to dc   - monitor drain output -> IR follow up in 8 weeks for tube eval/exchange  - f/u ID recs   - continue meropenam (5/5 - ) for now pending further recs

## 2024-05-07 NOTE — PROGRESS NOTE ADULT - PROBLEM SELECTOR PLAN 4
MRCP showed choledocholithiasis resulting in biliary ductal dilatation. Tbili, LFTs wnl.     - f/u GI recs -> tentative ERCP 5/8 or 5/9

## 2024-05-08 LAB
ALBUMIN SERPL ELPH-MCNC: 2.9 G/DL — LOW (ref 3.3–5)
ALP SERPL-CCNC: 83 U/L — SIGNIFICANT CHANGE UP (ref 40–120)
ALT FLD-CCNC: 30 U/L — SIGNIFICANT CHANGE UP (ref 10–45)
ANION GAP SERPL CALC-SCNC: 12 MMOL/L — SIGNIFICANT CHANGE UP (ref 5–17)
AST SERPL-CCNC: 31 U/L — SIGNIFICANT CHANGE UP (ref 10–40)
BILIRUB SERPL-MCNC: 0.2 MG/DL — SIGNIFICANT CHANGE UP (ref 0.2–1.2)
BUN SERPL-MCNC: 17 MG/DL — SIGNIFICANT CHANGE UP (ref 7–23)
CALCIUM SERPL-MCNC: 9 MG/DL — SIGNIFICANT CHANGE UP (ref 8.4–10.5)
CHLORIDE SERPL-SCNC: 100 MMOL/L — SIGNIFICANT CHANGE UP (ref 96–108)
CO2 SERPL-SCNC: 26 MMOL/L — SIGNIFICANT CHANGE UP (ref 22–31)
CREAT SERPL-MCNC: 0.58 MG/DL — SIGNIFICANT CHANGE UP (ref 0.5–1.3)
CULTURE RESULTS: ABNORMAL
CULTURE RESULTS: SIGNIFICANT CHANGE UP
CULTURE RESULTS: SIGNIFICANT CHANGE UP
EGFR: 90 ML/MIN/1.73M2 — SIGNIFICANT CHANGE UP
GLUCOSE SERPL-MCNC: 96 MG/DL — SIGNIFICANT CHANGE UP (ref 70–99)
HCT VFR BLD CALC: 33.3 % — LOW (ref 34.5–45)
HGB BLD-MCNC: 10.2 G/DL — LOW (ref 11.5–15.5)
MAGNESIUM SERPL-MCNC: 2.2 MG/DL — SIGNIFICANT CHANGE UP (ref 1.6–2.6)
MCHC RBC-ENTMCNC: 28.3 PG — SIGNIFICANT CHANGE UP (ref 27–34)
MCHC RBC-ENTMCNC: 30.6 GM/DL — LOW (ref 32–36)
MCV RBC AUTO: 92.5 FL — SIGNIFICANT CHANGE UP (ref 80–100)
NRBC # BLD: 0 /100 WBCS — SIGNIFICANT CHANGE UP (ref 0–0)
NT-PROBNP SERPL-SCNC: 696 PG/ML — HIGH (ref 0–300)
ORGANISM # SPEC MICROSCOPIC CNT: ABNORMAL
PHOSPHATE SERPL-MCNC: 3.1 MG/DL — SIGNIFICANT CHANGE UP (ref 2.5–4.5)
PLATELET # BLD AUTO: 325 K/UL — SIGNIFICANT CHANGE UP (ref 150–400)
POTASSIUM SERPL-MCNC: 4 MMOL/L — SIGNIFICANT CHANGE UP (ref 3.5–5.3)
POTASSIUM SERPL-SCNC: 4 MMOL/L — SIGNIFICANT CHANGE UP (ref 3.5–5.3)
PROT SERPL-MCNC: 6.4 G/DL — SIGNIFICANT CHANGE UP (ref 6–8.3)
RBC # BLD: 3.6 M/UL — LOW (ref 3.8–5.2)
RBC # FLD: 15.3 % — HIGH (ref 10.3–14.5)
SODIUM SERPL-SCNC: 138 MMOL/L — SIGNIFICANT CHANGE UP (ref 135–145)
SPECIMEN SOURCE: SIGNIFICANT CHANGE UP
WBC # BLD: 11.36 K/UL — HIGH (ref 3.8–10.5)
WBC # FLD AUTO: 11.36 K/UL — HIGH (ref 3.8–10.5)

## 2024-05-08 PROCEDURE — 99232 SBSQ HOSP IP/OBS MODERATE 35: CPT

## 2024-05-08 PROCEDURE — 99233 SBSQ HOSP IP/OBS HIGH 50: CPT | Mod: GC

## 2024-05-08 PROCEDURE — 99231 SBSQ HOSP IP/OBS SF/LOW 25: CPT | Mod: GC

## 2024-05-08 RX ORDER — ENOXAPARIN SODIUM 100 MG/ML
40 INJECTION SUBCUTANEOUS EVERY 24 HOURS
Refills: 0 | Status: DISCONTINUED | OUTPATIENT
Start: 2024-05-08 | End: 2024-05-09

## 2024-05-08 RX ADMIN — CHLORHEXIDINE GLUCONATE 1 APPLICATION(S): 213 SOLUTION TOPICAL at 11:42

## 2024-05-08 RX ADMIN — GABAPENTIN 800 MILLIGRAM(S): 400 CAPSULE ORAL at 06:25

## 2024-05-08 RX ADMIN — Medication 3 MILLILITER(S): at 17:02

## 2024-05-08 RX ADMIN — Medication 3 MILLILITER(S): at 23:52

## 2024-05-08 RX ADMIN — NYSTATIN CREAM 1 APPLICATION(S): 100000 CREAM TOPICAL at 17:04

## 2024-05-08 RX ADMIN — Medication 200 GRAM(S): at 23:54

## 2024-05-08 RX ADMIN — Medication 50 MICROGRAM(S): at 06:25

## 2024-05-08 RX ADMIN — LORATADINE 10 MILLIGRAM(S): 10 TABLET ORAL at 11:42

## 2024-05-08 RX ADMIN — Medication 200 GRAM(S): at 00:17

## 2024-05-08 RX ADMIN — ENOXAPARIN SODIUM 40 MILLIGRAM(S): 100 INJECTION SUBCUTANEOUS at 18:17

## 2024-05-08 RX ADMIN — Medication 0.25 MILLIGRAM(S): at 17:45

## 2024-05-08 RX ADMIN — MUPIROCIN 1 APPLICATION(S): 20 OINTMENT TOPICAL at 06:29

## 2024-05-08 RX ADMIN — NYSTATIN CREAM 1 APPLICATION(S): 100000 CREAM TOPICAL at 06:29

## 2024-05-08 RX ADMIN — Medication 200 GRAM(S): at 17:45

## 2024-05-08 RX ADMIN — Medication 3 MILLILITER(S): at 11:42

## 2024-05-08 RX ADMIN — NYSTATIN CREAM 1 APPLICATION(S): 100000 CREAM TOPICAL at 21:23

## 2024-05-08 RX ADMIN — GABAPENTIN 800 MILLIGRAM(S): 400 CAPSULE ORAL at 17:02

## 2024-05-08 RX ADMIN — Medication 0.25 MILLIGRAM(S): at 06:26

## 2024-05-08 RX ADMIN — Medication 200 GRAM(S): at 11:42

## 2024-05-08 RX ADMIN — Medication 3 MILLILITER(S): at 06:26

## 2024-05-08 RX ADMIN — ERTAPENEM SODIUM 120 MILLIGRAM(S): 1 INJECTION, POWDER, LYOPHILIZED, FOR SOLUTION INTRAMUSCULAR; INTRAVENOUS at 17:03

## 2024-05-08 RX ADMIN — Medication 200 GRAM(S): at 06:39

## 2024-05-08 NOTE — PROGRESS NOTE ADULT - ASSESSMENT
83 female history of COPD not on O2, obesity, HTN, CHF, BCC, hypothyroidism, psoriatic arthritis SUZIE BIBEMS for acute onset SOB and found to have sepsis secondary to perforated gallbladder with MRI/MRCP noting a 5.3 cm LEFT ovarian/adnexal mass with multiple omental and peritoneal deposits throughout the abdomen and pelvis, highly suspicious for primary gynecologic malignancy with peritoneal and omental carcinomatosis; cholelithiasis with discontinuity of the inflamed gallbladder wall contiguous with a 4.3 cm multiloculated collection in the adjacent liver in segment 5, most compatible with perforated cholecystitis with intrahepatic abscess and Choledocholithiasis resulting in biliary ductal dilatation prompting GI consult.     #Perforated gallbladder with 4.3 cm multiloculated collection in the adjacent liver in segment 5  - s/p IR drainage - fluid was positive for ESBL, currently on meropenem. Blood Cultures NGTD.     #CBD dilation w/ choledocholithiasis    #LEFT ovarian/adnexal mass with multiple omental and peritoneal deposits throughout the abdomen and pelvis, highly suspicious for primary   gynecologic malignancy with peritoneal and omental carcinomatosis - planning for o/p biopsy.     #Acute hypoxic respiratory failure 2/2 COPD, improved  - currently on steroids started on 5/4    Recommendations:  -trend clinical symptoms, exam findings, vital signs, CBC, CMP, INR  -patient high risk from lung perspective however pulmonology states is optimized for ERCP  -likely unable to happen until 5/10, however keep NPO at midnight in case nonurgent ERCP can be performed 5/9 pending schedule availability    Note incomplete until finalized by attending signature/attestation.    Cr Rocha  GI/Hepatology Fellow    MONDAY-FRIDAY 8AM-5PM:  Pager# [phone] ([facility]) or [phone] ([facility])    NON-URGENT CONSULTS:  Please email [email or web address]  AT NIGHT AND ON WEEKENDS:  Contact on-call GI fellow via answering service ([phone]) from 5pm-8am and on weekends/holidays

## 2024-05-08 NOTE — PROGRESS NOTE ADULT - PROBLEM SELECTOR PLAN 4
MRCP showed choledocholithiasis resulting in biliary ductal dilatation. Tbili, LFTs wnl.     - f/u GI recs -> tentative ERCP 5/8 or 5/9 MRCP showed choledocholithiasis resulting in biliary ductal dilatation. Tbili, LFTs wnl.     - f/u GI recs -> tentative ERCP 5/9  - pulm consulted for clearance

## 2024-05-08 NOTE — PROGRESS NOTE ADULT - PROBLEM SELECTOR PLAN 8
DVT PPx: Lovenox 40 mg qd  Diet: regular  Bowel Regimen: Miralax/Senna  Code: DNR/DNI w/ trial of intubation   Dispo: pending ERCP DVT PPx: Lovenox 40 mg qd  Diet: regular  Bowel Regimen: Miralax/Senna  Code: DNR/DNI w/ trial of intubation   Dispo: pending ERCP, likely friday

## 2024-05-08 NOTE — PROGRESS NOTE ADULT - SUBJECTIVE AND OBJECTIVE BOX
*******************************  Alejandra Mota MD (PGY-1)  Internal Medicine  Contact via [employer] TEAMS  *******************************    JUSTIN KEYES  83y  Female    Patient is a 83y old  Female who presents with a chief complaint of Sepsis, perforated gallbladder, sob, poss metastatic Ovarian CA (07 May 2024 16:08)      Subjective:    Objective:  T(C): 36.6 (05-08-24 @ 04:09), Max: 36.6 (05-08-24 @ 04:09)  HR: 67 (05-08-24 @ 05:20) (67 - 92)  BP: 143/72 (05-08-24 @ 04:09) (111/71 - 143/72)  RR: 18 (05-08-24 @ 04:09) (18 - 18)  SpO2: 96% (05-08-24 @ 05:20) (94% - 97%)  I&O's Summary    07 May 2024 07:01  -  08 May 2024 07:00  --------------------------------------------------------  IN: 480 mL / OUT: 1100 mL / NET: -620 mL        PHYSICAL EXAM:  GENERAL: NAD  HEAD:  Atraumatic, Normocephalic  EYES: EOMI, PERRLA, conjunctiva and sclera clear  ENMT: Moist mucous membranes  NECK: Supple, No JVD, trachea midline   NERVOUS SYSTEM:  Alert & Oriented X3, Good concentration; Motor Strength 5/5 B/L upper and lower extremities; DTRs 2+ intact and symmetric  CHEST/LUNG: Clear to auscultation bilaterally; No rales, rhonchi, wheezing, or rubs  HEART: Regular rate and rhythm; No murmurs, rubs, or gallops  ABDOMEN: Soft, Nontender, Nondistended; Bowel sounds present  EXTREMITIES:  2+ Peripheral Pulses, No clubbing, cyanosis, or edema  LYMPH: No lymphadenopathy noted  SKIN: No rashes or lesions    MEDICATIONS  (STANDING):  albuterol/ipratropium for Nebulization 3 milliLiter(s) Nebulizer every 6 hours  ampicillin  IVPB 2 Gram(s) IV Intermittent every 6 hours  bisacodyl 5 milliGRAM(s) Oral at bedtime  buDESOnide    Inhalation Suspension 0.25 milliGRAM(s) Inhalation two times a day  chlorhexidine 4% Liquid 1 Application(s) Topical daily  ertapenem  IVPB 1000 milliGRAM(s) IV Intermittent every 24 hours  gabapentin 800 milliGRAM(s) Oral every 12 hours  levothyroxine 50 MICROGram(s) Oral daily  loratadine 10 milliGRAM(s) Oral daily  nystatin Powder 1 Application(s) Topical three times a day  polyethylene glycol 3350 17 Gram(s) Oral two times a day  senna 2 Tablet(s) Oral at bedtime    MEDICATIONS  (PRN):  acetaminophen     Tablet .. 650 milliGRAM(s) Oral every 6 hours PRN Temp greater or equal to 38C (100.4F), Mild Pain (1 - 3)  oxyCODONE    IR 5 milliGRAM(s) Oral every 4 hours PRN Moderate Pain (4 - 6)      LABS:        CAPILLARY BLOOD GLUCOSE          RADIOLOGY & ADDITIONAL TESTS:               *******************************  Alejandra Mota MD (PGY-1)  Internal Medicine  Contact via [employer] TEAMS  *******************************    JUSTIN KEYES  83y  Female    Patient is a 83y old  Female who presents with a chief complaint of Sepsis, perforated gallbladder, sob, poss metastatic Ovarian CA (07 May 2024 16:08)    Subjective: No acute events overnight. Noted continued improvement in breathing. Denied any fever, chills, chest pain, sob, ab pain, n/v.     Objective:  T(C): 36.6 (05-08-24 @ 04:09), Max: 36.6 (05-08-24 @ 04:09)  HR: 67 (05-08-24 @ 05:20) (67 - 92)  BP: 143/72 (05-08-24 @ 04:09) (111/71 - 143/72)  RR: 18 (05-08-24 @ 04:09) (18 - 18)  SpO2: 96% (05-08-24 @ 05:20) (94% - 97%)  I&O's Summary    07 May 2024 07:01  -  08 May 2024 07:00  --------------------------------------------------------  IN: 480 mL / OUT: 1100 mL / NET: -620 mL    PHYSICAL EXAM:  GENERAL: NAD, obese  HEAD:  Atraumatic, Normocephalic  EYES: EOMI, PERRLA, conjunctiva and sclera clear  ENMT: Moist mucous membranes  NECK: Supple, No JVD, trachea midline   NERVOUS SYSTEM:  Alert & Oriented X3, Good concentration  CHEST/LUNG: Clear to auscultation bilaterally; No rales, rhonchi, wheezing, or rubs  HEART: Regular rate and rhythm; No murmurs, rubs, or gallops  ABDOMEN: Soft, Nontender, Nondistended; Bowel sounds present, perc dimple drain site c/d/i  EXTREMITIES:  2+ Peripheral Pulses, No clubbing, cyanosis, or edema  SKIN: No rashes or lesions    MEDICATIONS  (STANDING):  albuterol/ipratropium for Nebulization 3 milliLiter(s) Nebulizer every 6 hours  ampicillin  IVPB 2 Gram(s) IV Intermittent every 6 hours  bisacodyl 5 milliGRAM(s) Oral at bedtime  buDESOnide    Inhalation Suspension 0.25 milliGRAM(s) Inhalation two times a day  chlorhexidine 4% Liquid 1 Application(s) Topical daily  ertapenem  IVPB 1000 milliGRAM(s) IV Intermittent every 24 hours  gabapentin 800 milliGRAM(s) Oral every 12 hours  levothyroxine 50 MICROGram(s) Oral daily  loratadine 10 milliGRAM(s) Oral daily  nystatin Powder 1 Application(s) Topical three times a day  polyethylene glycol 3350 17 Gram(s) Oral two times a day  senna 2 Tablet(s) Oral at bedtime    MEDICATIONS  (PRN):  acetaminophen     Tablet .. 650 milliGRAM(s) Oral every 6 hours PRN Temp greater or equal to 38C (100.4F), Mild Pain (1 - 3)  oxyCODONE    IR 5 milliGRAM(s) Oral every 4 hours PRN Moderate Pain (4 - 6)    LABS:                        10.2   11.36 )-----------( 325      ( 08 May 2024 07:24 )             33.3     05-08    138  |  100  |  17  ----------------------------<  96  4.0   |  26  |  0.58    Ca    9.0      08 May 2024 07:24  Phos  3.1     05-08  Mg     2.2     05-08    TPro  6.4  /  Alb  2.9<L>  /  TBili  0.2  /  DBili  x   /  AST  31  /  ALT  30  /  AlkPhos  83  05-08    Culture Results:   Moderate Escherichia coli ESBL  Few Enterococcus faecium (05-03 @ 17:11)  Culture Results:   No growth at 5 days (05-03 @ 01:08)  Culture Results:   No growth at 5 days (05-03 @ 01:08)  Culture Results:   <10,000 CFU/mL Normal Urogenital Ijeoma (04-30 @ 23:24)  Culture Results:   No growth at 5 days (04-30 @ 21:17)  Culture Results:   No growth at 5 days (04-30 @ 21:00)    RADIOLOGY & ADDITIONAL TESTS:

## 2024-05-08 NOTE — PROGRESS NOTE ADULT - ASSESSMENT
83 female history of COPD not on O2, obesity, HTN, CHF, BCC, hypothyroidism, psoriatic arthritis SUZIE BIBEMS for acute onset SOB, admitted for sepsis secondary to perforated gallbladder s/p perc dimple 5/3. Also found to have complex cystic liver lesion and new left ovarian neoplasm w/ carcinomatosi, pending outpatient IR tissue biopsy. Hospital course complicated w/ AHRF likely 2/2 to COPD exacerbation.  83 female history of COPD not on O2, obesity, HTN, CHF, BCC, hypothyroidism, psoriatic arthritis SUZIE BIBEMS for acute onset SOB, admitted for sepsis secondary to perforated gallbladder s/p perc dimple 5/3 now pending ERCP for choledocholithiases w/ duct dilation. Also found to have complex cystic liver lesion and new left ovarian neoplasm w/ carcinomatosi, pending outpatient IR tissue biopsy. Hospital course complicated w/ AHRF likely 2/2 to COPD exacerbation.

## 2024-05-08 NOTE — PROGRESS NOTE ADULT - SUBJECTIVE AND OBJECTIVE BOX
Interventional Radiology Follow-Up Note    This is a 83y Female s/p percutaneous cholecystostomy tube placement on 5/3 in Interventional Radiology with Dr. Madden.     S: Patient seen and examined @ bedside earlier this morning. No complaints offered. Denies abdominal pain.     Medication:  ampicillin  IVPB: (05-08)  ertapenem  IVPB: (05-07)  meropenem  IVPB: (05-06)    Vitals:  T(F): 97.9, Max: 97.9 (04:09)  HR: 67  BP: 143/72  RR: 18  SpO2: 96%    Physical Exam:  General: Nontoxic, in NAD, A&O x3.  Abdomen: soft, NTND, no peritoneal signs.  Drain Device: Drain intact attached to gravity bag with slightly thick cloudy bile. Flushed with 5 cc of normal saline without evidence of resistance, leaking or pain. Dressing clean, dry, intact.    24hr Drain output: 55 cc    LABS:  WBC 11.36 / Hgb 10.2 / Hct 33.3 / Plt 325  Na 138 / K 4.0 / CO2 26 / Cl 100 / BUN 17 / Cr 0.58 / Glucose 96  ALT 30 / AST 31 / Alk Phos 83 / Tbili 0.2  Ptt -- / Pt -- / INR --      Assessment/Plan: 83 female history of COPD not on O2, obesity, HTN, CHF, BCC, hypothyroidism, psoriatic arthritis SUZIE BIBEMS for acute onset SOB. 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 Moderate volume ascites. Trace left pleural effusion. Patient is currently s/p percutaneous cholecystostomy tube placement on 5/3 in Interventional Radiology with Dr. Madden.     - WBC from 9 to 11, pt currently on amp and ertapenem, ID following.  - ERCP planned for tomorrow with GI as per note.  -continue global management per primary team  -monitor h/h; transfuse as needed  -trend vs/labs  -flush drain with 5cc NS daily forward only; DO NOT aspirate  -change dressing q3 days or when dressing is saturated  -regarding outpatient follow up with IR, if the patient is d/c home with drainage catheter they can make an appointment with IR by calling the IR booking office at [phone]; recommend IR follow in _____wks/months for tube evaluation.  - Greater than 50% of the encounter was spent counseling and/or coordination of care on drain management and follow up.   -they will benefit from VNS service to help with drainage catheter care; they should continue same drainage catheter care as an outpatient.     Please call IR at extension [phone] with any questions, concerns, or issues regarding above.

## 2024-05-08 NOTE — PROGRESS NOTE ADULT - PROBLEM SELECTOR PLAN 5
Met sepsis criteria w/ leukocytosis, tachypnea, fever and tachycardia. Likely secondary to perforated gallbladder seen on CT scan. MRCP findings suggestive of perforated cholecystitis with intrahepatic abscess. U/A, RVP negative. CTA neg for PE. Blood and urine cultures negative.   Resolved     - continue w/ abx   - trend WBC and fever curve

## 2024-05-08 NOTE — PROGRESS NOTE ADULT - PROBLEM SELECTOR PLAN 1
CT findings c/f cholelithiasis with perforation and possible abscess. HIDA scan negative. MRCP shows   Choledocholithiasis resulting in biliary ductal dilatation. Also w/ cholelithiasis with discontinuity of the inflamed gallbladder wall contiguous with a 4.3 cm multiloculated collection that is most compatible with perforated cholecystitis with intrahepatic abscess.   s/p perc dimple w/ IR 5/3. bile fluid culture w/ moderate ESBL E coli and few enterococcus faecium.   s/p zosyn 5/1-5/5, meropenem (5/5-6)     - continue ampicillin and ertapenam (5/6- ) for extended course, will need PICC line placement prior to dc   - monitor drain output -> IR follow up in 8 weeks for tube eval/exchange  - f/u ID recs   - continue meropenam (5/5 - ) for now pending further recs CT findings c/f cholelithiasis with perforation and possible abscess. HIDA scan negative. MRCP shows   Choledocholithiasis resulting in biliary ductal dilatation. Also w/ cholelithiasis with discontinuity of the inflamed gallbladder wall contiguous with a 4.3 cm multiloculated collection that is most compatible with perforated cholecystitis with intrahepatic abscess.   s/p perc dimple w/ IR 5/3. bile fluid culture w/ moderate ESBL E coli and few enterococcus faecium.   s/p zosyn 5/1-5/5, meropenem (5/5-6)     - continue ampicillin and ertapenam (5/6- ) for extended course, will need PICC line placement prior to dc   - monitor drain output -> IR follow up in 8 weeks for tube eval/exchange  - f/u ID recs

## 2024-05-08 NOTE — PROGRESS NOTE ADULT - SUBJECTIVE AND OBJECTIVE BOX
Interval Events:   No acute events overnight.  Patient without acute symptoms at this time.  She endorses subjective improvement.    ROS:   12 point review of systems performed and negative except otherwise noted in HPI.    Hospital Medications:  acetaminophen     Tablet .. 650 milliGRAM(s) Oral every 6 hours PRN  albuterol/ipratropium for Nebulization 3 milliLiter(s) Nebulizer every 6 hours  ampicillin  IVPB 2 Gram(s) IV Intermittent every 6 hours  bisacodyl 5 milliGRAM(s) Oral at bedtime  buDESOnide    Inhalation Suspension 0.25 milliGRAM(s) Inhalation two times a day  chlorhexidine 4% Liquid 1 Application(s) Topical daily  ertapenem  IVPB 1000 milliGRAM(s) IV Intermittent every 24 hours  gabapentin 800 milliGRAM(s) Oral every 12 hours  levothyroxine 50 MICROGram(s) Oral daily  loratadine 10 milliGRAM(s) Oral daily  nystatin Powder 1 Application(s) Topical three times a day  oxyCODONE    IR 5 milliGRAM(s) Oral every 4 hours PRN  polyethylene glycol 3350 17 Gram(s) Oral two times a day  senna 2 Tablet(s) Oral at bedtime      PHYSICAL EXAM:   Vital Signs:  Vital Signs Last 24 Hrs  T(C): 36.5 (08 May 2024 13:30), Max: 36.6 (08 May 2024 04:09)  T(F): 97.7 (08 May 2024 13:30), Max: 97.9 (08 May 2024 04:09)  HR: 90 (08 May 2024 13:30) (67 - 92)  BP: 139/74 (08 May 2024 13:30) (139/74 - 143/72)  BP(mean): --  RR: 18 (08 May 2024 13:30) (18 - 18)  SpO2: 97% (08 May 2024 15:26) (90% - 97%)    Parameters below as of 08 May 2024 13:30  Patient On (Oxygen Delivery Method): room air      Daily     Daily     GENERAL: no acute distress  NEURO: alert  HEENT: NCAT, no conjunctival pallor appreciated  CHEST: no respiratory distress, no accessory muscle use  CARDIAC: regular rate, +S1/S2  ABDOMEN: soft, nontender, no rebound or guarding  EXTREMITIES: warm, well perfused  SKIN: no lesions noted    LABS: reviewed                        10.2   11.36 )-----------( 325      ( 08 May 2024 07:24 )             33.3     05-08    138  |  100  |  17  ----------------------------<  96  4.0   |  26  |  0.58    Ca    9.0      08 May 2024 07:24  Phos  3.1     05-08  Mg     2.2     05-08    TPro  6.4  /  Alb  2.9<L>  /  TBili  0.2  /  DBili  x   /  AST  31  /  ALT  30  /  AlkPhos  83  05-08    LIVER FUNCTIONS - ( 08 May 2024 07:24 )  Alb: 2.9 g/dL / Pro: 6.4 g/dL / ALK PHOS: 83 U/L / ALT: 30 U/L / AST: 31 U/L / GGT: x             Interval Diagnostic Studies: see sunrise for full report

## 2024-05-08 NOTE — PROGRESS NOTE ADULT - PROBLEM SELECTOR PLAN 2
Pt previously treated at [facility] for pneumonia. Also w/ hx of COPD not on any medications or home medications. Suspect AHRF 2/2 to COPD exacerbation.  s/p azithromycin   Improving    - continue duonebs, budesonide  - continue prednisone 40mg qd (5/4- ) for 5 days  - wean oxygen as tolerated  - CPAP at night for SUZIE Pt previously treated at [facility] for pneumonia. Also w/ hx of COPD not on any medications or home medications. Suspect AHRF 2/2 to COPD exacerbation.  s/p azithromycin x 3days, prednisone 40 x 4 days   Improving    - continue duonebs, budesonide  - wean oxygen as tolerated  - CPAP at night for SUZIE

## 2024-05-09 LAB
ANION GAP SERPL CALC-SCNC: 11 MMOL/L — SIGNIFICANT CHANGE UP (ref 5–17)
APTT BLD: 25.9 SEC — SIGNIFICANT CHANGE UP (ref 24.5–35.6)
BASOPHILS # BLD AUTO: 0.06 K/UL — SIGNIFICANT CHANGE UP (ref 0–0.2)
BASOPHILS NFR BLD AUTO: 0.4 % — SIGNIFICANT CHANGE UP (ref 0–2)
BLD GP AB SCN SERPL QL: NEGATIVE — SIGNIFICANT CHANGE UP
BUN SERPL-MCNC: 16 MG/DL — SIGNIFICANT CHANGE UP (ref 7–23)
CALCIUM SERPL-MCNC: 8.4 MG/DL — SIGNIFICANT CHANGE UP (ref 8.4–10.5)
CHLORIDE SERPL-SCNC: 100 MMOL/L — SIGNIFICANT CHANGE UP (ref 96–108)
CO2 SERPL-SCNC: 26 MMOL/L — SIGNIFICANT CHANGE UP (ref 22–31)
CREAT SERPL-MCNC: 0.52 MG/DL — SIGNIFICANT CHANGE UP (ref 0.5–1.3)
EGFR: 93 ML/MIN/1.73M2 — SIGNIFICANT CHANGE UP
EOSINOPHIL # BLD AUTO: 0.36 K/UL — SIGNIFICANT CHANGE UP (ref 0–0.5)
EOSINOPHIL NFR BLD AUTO: 2.6 % — SIGNIFICANT CHANGE UP (ref 0–6)
GLUCOSE SERPL-MCNC: 100 MG/DL — HIGH (ref 70–99)
HCT VFR BLD CALC: 34.2 % — LOW (ref 34.5–45)
HGB BLD-MCNC: 10.9 G/DL — LOW (ref 11.5–15.5)
IMM GRANULOCYTES NFR BLD AUTO: 2 % — HIGH (ref 0–0.9)
INR BLD: 1.05 RATIO — SIGNIFICANT CHANGE UP (ref 0.85–1.18)
LYMPHOCYTES # BLD AUTO: 1.41 K/UL — SIGNIFICANT CHANGE UP (ref 1–3.3)
LYMPHOCYTES # BLD AUTO: 10.1 % — LOW (ref 13–44)
MAGNESIUM SERPL-MCNC: 1.9 MG/DL — SIGNIFICANT CHANGE UP (ref 1.6–2.6)
MCHC RBC-ENTMCNC: 28.8 PG — SIGNIFICANT CHANGE UP (ref 27–34)
MCHC RBC-ENTMCNC: 31.9 GM/DL — LOW (ref 32–36)
MCV RBC AUTO: 90.5 FL — SIGNIFICANT CHANGE UP (ref 80–100)
MONOCYTES # BLD AUTO: 1.31 K/UL — HIGH (ref 0–0.9)
MONOCYTES NFR BLD AUTO: 9.4 % — SIGNIFICANT CHANGE UP (ref 2–14)
NEUTROPHILS # BLD AUTO: 10.55 K/UL — HIGH (ref 1.8–7.4)
NEUTROPHILS NFR BLD AUTO: 75.5 % — SIGNIFICANT CHANGE UP (ref 43–77)
NRBC # BLD: 0 /100 WBCS — SIGNIFICANT CHANGE UP (ref 0–0)
PHOSPHATE SERPL-MCNC: 3.4 MG/DL — SIGNIFICANT CHANGE UP (ref 2.5–4.5)
PLATELET # BLD AUTO: 336 K/UL — SIGNIFICANT CHANGE UP (ref 150–400)
POTASSIUM SERPL-MCNC: 4 MMOL/L — SIGNIFICANT CHANGE UP (ref 3.5–5.3)
POTASSIUM SERPL-SCNC: 4 MMOL/L — SIGNIFICANT CHANGE UP (ref 3.5–5.3)
PROTHROM AB SERPL-ACNC: 11 SEC — SIGNIFICANT CHANGE UP (ref 9.5–13)
RBC # BLD: 3.78 M/UL — LOW (ref 3.8–5.2)
RBC # FLD: 15.3 % — HIGH (ref 10.3–14.5)
RH IG SCN BLD-IMP: POSITIVE — SIGNIFICANT CHANGE UP
SODIUM SERPL-SCNC: 137 MMOL/L — SIGNIFICANT CHANGE UP (ref 135–145)
WBC # BLD: 13.97 K/UL — HIGH (ref 3.8–10.5)
WBC # FLD AUTO: 13.97 K/UL — HIGH (ref 3.8–10.5)

## 2024-05-09 PROCEDURE — 99232 SBSQ HOSP IP/OBS MODERATE 35: CPT | Mod: GC

## 2024-05-09 PROCEDURE — 71045 X-RAY EXAM CHEST 1 VIEW: CPT | Mod: 26

## 2024-05-09 PROCEDURE — 99232 SBSQ HOSP IP/OBS MODERATE 35: CPT

## 2024-05-09 RX ADMIN — NYSTATIN CREAM 1 APPLICATION(S): 100000 CREAM TOPICAL at 05:44

## 2024-05-09 RX ADMIN — Medication 3 MILLILITER(S): at 17:26

## 2024-05-09 RX ADMIN — LORATADINE 10 MILLIGRAM(S): 10 TABLET ORAL at 12:29

## 2024-05-09 RX ADMIN — Medication 3 MILLILITER(S): at 21:22

## 2024-05-09 RX ADMIN — Medication 200 GRAM(S): at 12:29

## 2024-05-09 RX ADMIN — ENOXAPARIN SODIUM 40 MILLIGRAM(S): 100 INJECTION SUBCUTANEOUS at 17:27

## 2024-05-09 RX ADMIN — NYSTATIN CREAM 1 APPLICATION(S): 100000 CREAM TOPICAL at 21:22

## 2024-05-09 RX ADMIN — CHLORHEXIDINE GLUCONATE 1 APPLICATION(S): 213 SOLUTION TOPICAL at 12:29

## 2024-05-09 RX ADMIN — ERTAPENEM SODIUM 120 MILLIGRAM(S): 1 INJECTION, POWDER, LYOPHILIZED, FOR SOLUTION INTRAMUSCULAR; INTRAVENOUS at 17:26

## 2024-05-09 RX ADMIN — Medication 0.25 MILLIGRAM(S): at 17:26

## 2024-05-09 RX ADMIN — NYSTATIN CREAM 1 APPLICATION(S): 100000 CREAM TOPICAL at 13:37

## 2024-05-09 RX ADMIN — Medication 0.25 MILLIGRAM(S): at 06:03

## 2024-05-09 RX ADMIN — Medication 3 MILLILITER(S): at 05:43

## 2024-05-09 RX ADMIN — Medication 200 GRAM(S): at 18:05

## 2024-05-09 RX ADMIN — Medication 3 MILLILITER(S): at 12:29

## 2024-05-09 RX ADMIN — GABAPENTIN 800 MILLIGRAM(S): 400 CAPSULE ORAL at 06:27

## 2024-05-09 RX ADMIN — Medication 200 GRAM(S): at 06:00

## 2024-05-09 RX ADMIN — GABAPENTIN 800 MILLIGRAM(S): 400 CAPSULE ORAL at 17:26

## 2024-05-09 RX ADMIN — Medication 50 MICROGRAM(S): at 05:41

## 2024-05-09 NOTE — PROVIDER CONTACT NOTE (CRITICAL VALUE NOTIFICATION) - TEST AND RESULT REPORTED:
Body fluid cx on may3rd moderate E Coli faecium
Culture positive for moderate E Coli ESBL and few enterococcus faecium
Culture bile fluids, gram negative rods

## 2024-05-09 NOTE — ADVANCED PRACTICE NURSE CONSULT - REASON FOR CONSULT
Bedside midline order placed.   Indication: long term antibiotics   Bedside PICC order placed.   Indication: long term antibiotics

## 2024-05-09 NOTE — PROGRESS NOTE ADULT - PROBLEM SELECTOR PLAN 2
Pt previously treated at [facility] for pneumonia. Also w/ hx of COPD not on any medications or home medications. Suspect AHRF 2/2 to COPD exacerbation.  s/p azithromycin x 3days, prednisone 40 x 4 days   Improving    - continue duonebs, budesonide  - wean oxygen as tolerated  - CPAP at night for SUZIE MRCP showed choledocholithiasis resulting in biliary ductal dilatation. Tbili, LFTs wnl.     - f/u GI recs -> tentative ERCP 5/9  - pulm consulted ok to proceed  - TTE requested for full workup of clearance

## 2024-05-09 NOTE — PROGRESS NOTE ADULT - PROBLEM SELECTOR PLAN 8
DVT PPx: Lovenox 40 mg qd  Diet: regular  Bowel Regimen: Miralax/Senna  Code: DNR/DNI w/ trial of intubation   Dispo: pending ERCP, likely friday DVT PPx: Lovenox 40 mg qd  Diet: regular  Bowel Regimen: Miralax/Senna  Code: DNR/DNI w/ trial of intubation   Dispo: pending ERCP friday

## 2024-05-09 NOTE — PROGRESS NOTE ADULT - SUBJECTIVE AND OBJECTIVE BOX
[facility]  Division of Infectious Diseases  [phone]    Name: JUSTIN KEYES  Age: 83y  Gender: Female  MRN: [id number]    Interval History--  Notes reviewed.     Past Medical History--  Hypothyroid    Chronic bronchitis    Psoriatic arthritis    Osteoarthritis    Overactive bladder    Basal cell carcinoma    Squamous cell carcinoma    History of carpal tunnel release    H/O total shoulder replacement, left    H/O:         For details regarding the patient's social history, family history, and other miscellaneous elements, please refer the initial infectious diseases consultation and/or the admitting history and physical examination for this admission.    Allergies    latex (Rash)  erythromycin (Rash)  phenobarbital (Rash)    Intolerances    Milk (Rash)      Medications--  Antibiotics:  ampicillin  IVPB 2 Gram(s) IV Intermittent every 6 hours  ertapenem  IVPB 1000 milliGRAM(s) IV Intermittent every 24 hours    Immunologic:    Other:  acetaminophen     Tablet .. PRN  albuterol/ipratropium for Nebulization  bisacodyl  buDESOnide    Inhalation Suspension  chlorhexidine 4% Liquid  enoxaparin Injectable  gabapentin  levothyroxine  loratadine  nystatin Powder  polyethylene glycol 3350  senna      Review of Systems--  A 10-point review of systems was obtained.     Pertinent positives and negatives--  Constitutional: No fevers. No Chills. No Rigors.   Cardiovascular: No chest pain. No palpitations.  Respiratory: No shortness of breath. No cough.  Gastrointestinal: No nausea or vomiting. No diarrhea or constipation.   Psychiatric: Pleasant. Appropriate affect.    Review of systems otherwise negative except as previously noted.    Physical Examination--  Vital Signs: T(F): 98.4 (24 @ 09:34), Max: 98.4 (24 @ 09:34)  HR: 97 (24 @ 09:34)  BP: 141/70 (24 @ 09:34)  RR: 20 (24 @ 09:34)  SpO2: 94% (24 @ 09:34)  Wt(kg): --  General: Nontoxic-appearing Female in no acute distress.  HEENT: AT/NC. PERRL. EOMI. Anicteric. Conjunctiva pink and moist. Oropharynx clear. Dentition fair.  Neck: Not rigid. No sense of mass.  Nodes: None palpable.  Lungs: Clear bilaterally without rales, wheezing or rhonchi  Heart: Regular rate and rhythm. No Murmur. No rub. No gallop. No palpable thrill.  Abdomen: Bowel sounds present and normoactive. Soft. Nondistended. Nontender. No sense of mass. No organomegaly.  Back: No spinal tenderness. No costovertebral angle tenderness.   Extremities: No cyanosis or clubbing. No edema.   Skin: Warm. Dry. Good turgor. No rash. No vasculitic stigmata.  Psychiatric: Appropriate affect and mood for situation.         Laboratory Studies--  CBC                        10.9   13.97 )-----------( 336      ( 09 May 2024 03:40 )             34.2       Chemistries  -    137  |  100  |  16  ----------------------------<  100<H>  4.0   |  26  |  0.52    Ca    8.4      09 May 2024 03:40  Phos  3.4     05-  Mg     1.9     -    TPro  6.4  /  Alb  2.9<L>  /  TBili  0.2  /  DBili  x   /  AST  31  /  ALT  30  /  AlkPhos  83  05-08      Culture Data    Culture - Body Fluid with Gram Stain (collected 03 May 2024 17:11)  Source: Bile Bile Fluid  Gram Stain (04 May 2024 06:18):    polymorphonuclear leukocytes seen    Gram Negative Rods seen    by cytocentrifuge  Final Report (08 May 2024 22:14):    Moderate Escherichia coli ESBL    Few Enterococcus faecium  Organism: Escherichia coli ESBL  Enterococcus faecium (08 May 2024 22:14)  Organism: Enterococcus faecium (08 May 2024 22:14)  Organism: Escherichia coli ESBL (08 May 2024 22:14)    Culture - Blood (collected 03 May 2024 01:08)  Source: .Blood Blood-Peripheral  Final Report (08 May 2024 05:01):    No growth at 5 days    Culture - Blood (collected 03 May 2024 01:08)  Source: .Blood Blood-Peripheral  Final Report (08 May 2024 05:01):    No growth at 5 days             [facility]  Division of Infectious Diseases  [phone]    Name: JUSTIN KEYES  Age: 83y  Gender: Female  MRN: [id number]    Interval History--  Notes reviewed. Seen earlier today. No new issues. Comfortable on RA. D/W housestaff.   Son at bedside.       Past Medical History--  Hypothyroid    Chronic bronchitis    Psoriatic arthritis    Osteoarthritis    Overactive bladder    Basal cell carcinoma    Squamous cell carcinoma    History of carpal tunnel release    H/O total shoulder replacement, left    H/O:       For details regarding the patient's social history, family history, and other miscellaneous elements, please refer the initial infectious diseases consultation and/or the admitting history and physical examination for this admission.    Allergies    latex (Rash)  erythromycin (Rash)  phenobarbital (Rash)    Intolerances    Milk (Rash)      Medications--  Antibiotics:  ampicillin  IVPB 2 Gram(s) IV Intermittent every 6 hours  ertapenem  IVPB 1000 milliGRAM(s) IV Intermittent every 24 hours    Immunologic:    Other:  acetaminophen     Tablet .. PRN  albuterol/ipratropium for Nebulization  bisacodyl  buDESOnide    Inhalation Suspension  chlorhexidine 4% Liquid  enoxaparin Injectable  gabapentin  levothyroxine  loratadine  nystatin Powder  polyethylene glycol 3350  senna      Review of Systems--  A 10-point review of systems was obtained.   Review of systems otherwise negative except as previously noted.    Physical Examination--  Vital Signs: T(F): 98.4 (24 @ 09:34), Max: 98.4 (24 @ 09:34)  HR: 97 (24 @ 09:34)  BP: 141/70 (24 @ 09:34)  RR: 20 (24 @ 09:34)  SpO2: 94% (24 @ 09:34)  Wt(kg): --  General: Nontoxic-appearing Female in no acute distress.  HEENT: AT/NC. PERRL. EOMI. Anicteric. Conjunctiva pink and moist. Oropharynx clear. Dentition fair.  Neck: Not rigid. No sense of mass.  Nodes: None palpable.  Lungs: Clear bilaterally without rales, wheezing or rhonchi  Heart: Regular rate and rhythm. No Murmur. No rub. No gallop. No palpable thrill.  Abdomen: Bowel sounds present and normoactive. Soft. Nondistended. Nontender. No sense of mass. No organomegaly.  Back: No spinal tenderness. No costovertebral angle tenderness.   Extremities: No cyanosis or clubbing. No edema.   Skin: Warm. Dry. Good turgor. No rash. No vasculitic stigmata.  Psychiatric: Appropriate affect and mood for situation.         Laboratory Studies--  CBC                        10.9   13.97 )-----------( 336      ( 09 May 2024 03:40 )             34.2       Chemistries      137  |  100  |  16  ----------------------------<  100<H>  4.0   |  26  |  0.52    Ca    8.4      09 May 2024 03:40  Phos  3.4       Mg     1.9         TPro  6.4  /  Alb  2.9<L>  /  TBili  0.2  /  DBili  x   /  AST  31  /  ALT  30  /  AlkPhos  83  08      Culture Data    Culture - Body Fluid with Gram Stain (collected 03 May 2024 17:11)  Source: Bile Bile Fluid  Gram Stain (04 May 2024 06:18):    polymorphonuclear leukocytes seen    Gram Negative Rods seen    by cytocentrifuge  Final Report (08 May 2024 22:14):    Moderate Escherichia coli ESBL    Few Enterococcus faecium  Organism: Escherichia coli ESBL  Enterococcus faecium (08 May 2024 22:14)  Organism: Enterococcus faecium (08 May 2024 22:14)  Organism: Escherichia coli ESBL (08 May 2024 22:14)    Culture - Blood (collected 03 May 2024 01:08)  Source: .Blood Blood-Peripheral  Final Report (08 May 2024 05:01):    No growth at 5 days    Culture - Blood (collected 03 May 2024 01:08)  Source: .Blood Blood-Peripheral  Final Report (08 May 2024 05:01):    No growth at 5 days             [facility]  Division of Infectious Diseases  [phone]    Name: JUSTIN KEYES  Age: 83y  Gender: Female  MRN: [id number]    Interval History--  Notes reviewed. Seen earlier today. No new issues. Comfortable on RA. D/W housestaff.   Son at bedside.       Past Medical History--  Hypothyroid    Chronic bronchitis    Psoriatic arthritis    Osteoarthritis    Overactive bladder    Basal cell carcinoma    Squamous cell carcinoma    History of carpal tunnel release    H/O total shoulder replacement, left    H/O:       For details regarding the patient's social history, family history, and other miscellaneous elements, please refer the initial infectious diseases consultation and/or the admitting history and physical examination for this admission.    Allergies    latex (Rash)  erythromycin (Rash)  phenobarbital (Rash)    Intolerances    Milk (Rash)      Medications--  Antibiotics:  ampicillin  IVPB 2 Gram(s) IV Intermittent every 6 hours  ertapenem  IVPB 1000 milliGRAM(s) IV Intermittent every 24 hours    Immunologic:    Other:  acetaminophen     Tablet .. PRN  albuterol/ipratropium for Nebulization  bisacodyl  buDESOnide    Inhalation Suspension  chlorhexidine 4% Liquid  enoxaparin Injectable  gabapentin  levothyroxine  loratadine  nystatin Powder  polyethylene glycol 3350  senna      Review of Systems--  A 10-point review of systems was obtained.   Review of systems otherwise negative except as previously noted.    Physical Examination--  Vital Signs: T(F): 98.4 (24 @ 09:34), Max: 98.4 (24 @ 09:34)  HR: 97 (24 @ 09:34)  BP: 141/70 (24 @ 09:34)  RR: 20 (24 @ 09:34)  SpO2: 94% (24 @ 09:34)  Wt(kg): --  General: Nontoxic-appearing Female in no acute distress.  HEENT: AT/NC. Anicteric. Conjunctiva pink and moist. Oropharynx clear.  Neck: Not rigid. No sense of mass.  Nodes: None palpable.  Lungs: Diminished BS B no RWR  Heart: Regular rate and rhythm.   Abdomen: Bowel sounds present and normoactive. Soft. Nondistended. NT.   Extremities: No cyanosis or clubbing. No edema. Venous insuffciency changes LE B.  Skin: Warm. Dry. Good turgor. No vasculitic stigmata.  Psychiatric: Appropriate affect and mood for situation.         Laboratory Studies--  CBC                        10.9   13.97 )-----------( 336      ( 09 May 2024 03:40 )             34.2     WBC Count: 11.36 K/uL (24 @ 07:24)  WBC Count: 9.15 K/uL (24 @ 07:07)  WBC Count: 11.26 K/uL (24 @ 06:37)  WBC Count: 10.45 K/uL (24 @ 06:57)      Chemistries      137  |  100  |  16  ----------------------------<  100<H>  4.0   |  26  |  0.52    Ca    8.4      09 May 2024 03:40  Phos  3.4       Mg     1.9         TPro  6.4  /  Alb  2.9<L>  /  TBili  0.2  /  DBili  x   /  AST  31  /  ALT  30  /  AlkPhos  83        Culture Data    Culture - Body Fluid with Gram Stain (collected 03 May 2024 17:11)  Source: Bile Bile Fluid  Gram Stain (04 May 2024 06:18):    polymorphonuclear leukocytes seen    Gram Negative Rods seen    by cytocentrifuge  Final Report (08 May 2024 22:14):    Moderate Escherichia coli ESBL    Few Enterococcus faecium  Organism: Escherichia coli ESBL  Enterococcus faecium (08 May 2024 22:14)  Organism: Enterococcus faecium (08 May 2024 22:14)  Organism: Escherichia coli ESBL (08 May 2024 22:14)    Culture - Blood (collected 03 May 2024 01:08)  Source: .Blood Blood-Peripheral  Final Report (08 May 2024 05:01):    No growth at 5 days    Culture - Blood (collected 03 May 2024 01:08)  Source: .Blood Blood-Peripheral  Final Report (08 May 2024 05:01):    No growth at 5 days

## 2024-05-09 NOTE — PROGRESS NOTE ADULT - PROBLEM SELECTOR PLAN 4
MRCP showed choledocholithiasis resulting in biliary ductal dilatation. Tbili, LFTs wnl.     - f/u GI recs -> tentative ERCP 5/9  - pulm consulted for clearance Pt previously treated at [facility] for pneumonia. Also w/ hx of COPD not on any medications or home medications. Suspect AHRF 2/2 to COPD exacerbation.  s/p azithromycin x 3days, prednisone 40 x 4 days   RESOLVED     - continue duonebs, budesonide  - wean oxygen as tolerated  - CPAP at night for SUZIE

## 2024-05-09 NOTE — PROGRESS NOTE ADULT - ASSESSMENT
Perforated gallbladder  Liver lesion, probably abscess related to adjacent perforated GB  Absence of tracer on the NM SPECT suggests lack of active connection between liver lesion and GB.  Choledocholithiasis with CBD dilatation, ERCP planned  Would question whether this process actually began back in February  Peritoneal studding worrisome for carcinomatosis in the setting of ovarian mass, bx deferred  ?COPD. Lung fields quiet at present. Steroids make her abdominal exam and leukocytosis harder to interpret, and would hope to limit their use in the setting of active infection.     5/7: Essentially unchanged. Benign abdomen but still on steroids which could be mask intra-abdominal inflammation. WBC down today. Antibiotics altered, after review with ID pharmacy team odds that isolate would be sensitive to imipenem seem remote based on relatively high TAMELA to PCN with E. faecium. Amount of drainage seems scant.   5/9: Slight uptick in WBC, last dose of steroids 5/7. No concern of active or uncontrolled inrection on exam. I do not see a role to alter antibiotics at this point in time. Comfortable on RA.    Suggestions--  Plan for 6-week total course of antibiotics  Continue Ertapenem 1g qday   Continue Ampicillin 2g IVPB q6h  Await ERCP  Eventual interval imaging to assess impact of antibiotics and GB drainage on lesion- if not improving may need dedicated drainage  Trend CBC, CMP  Serial abdominal exams  Monitor drain output    D/W son at bedside.     Vincent Skinner MD  Attending Physician  [facility]  Division of Infectious Diseases  [phone]

## 2024-05-09 NOTE — PROGRESS NOTE ADULT - SUBJECTIVE AND OBJECTIVE BOX
*******************************  Alejandra Mota MD (PGY-1)  Internal Medicine  Contact via [employer] TEAMS  *******************************    JUSTIN KEYES  83y  Female    Patient is a 83y old  Female who presents with a chief complaint of Sepsis, perforated gallbladder, sob, poss metastatic Ovarian CA (08 May 2024 15:39)      Subjective:    Objective:  T(C): 36.8 (05-09-24 @ 04:05), Max: 36.8 (05-08-24 @ 20:00)  HR: 67 (05-09-24 @ 04:42) (67 - 94)  BP: 126/67 (05-09-24 @ 04:05) (126/67 - 147/77)  RR: 20 (05-09-24 @ 04:05) (18 - 20)  SpO2: 98% (05-09-24 @ 04:42) (90% - 99%)  I&O's Summary    08 May 2024 07:01  -  09 May 2024 07:00  --------------------------------------------------------  IN: 480 mL / OUT: 425 mL / NET: 55 mL        PHYSICAL EXAM:  GENERAL: NAD  HEAD:  Atraumatic, Normocephalic  EYES: EOMI, PERRLA, conjunctiva and sclera clear  ENMT: Moist mucous membranes  NECK: Supple, No JVD, trachea midline   NERVOUS SYSTEM:  Alert & Oriented X3, Good concentration; Motor Strength 5/5 B/L upper and lower extremities; DTRs 2+ intact and symmetric  CHEST/LUNG: Clear to auscultation bilaterally; No rales, rhonchi, wheezing, or rubs  HEART: Regular rate and rhythm; No murmurs, rubs, or gallops  ABDOMEN: Soft, Nontender, Nondistended; Bowel sounds present  EXTREMITIES:  2+ Peripheral Pulses, No clubbing, cyanosis, or edema  LYMPH: No lymphadenopathy noted  SKIN: No rashes or lesions    MEDICATIONS  (STANDING):  albuterol/ipratropium for Nebulization 3 milliLiter(s) Nebulizer every 6 hours  ampicillin  IVPB 2 Gram(s) IV Intermittent every 6 hours  bisacodyl 5 milliGRAM(s) Oral at bedtime  buDESOnide    Inhalation Suspension 0.25 milliGRAM(s) Inhalation two times a day  chlorhexidine 4% Liquid 1 Application(s) Topical daily  enoxaparin Injectable 40 milliGRAM(s) SubCutaneous every 24 hours  ertapenem  IVPB 1000 milliGRAM(s) IV Intermittent every 24 hours  gabapentin 800 milliGRAM(s) Oral every 12 hours  levothyroxine 50 MICROGram(s) Oral daily  loratadine 10 milliGRAM(s) Oral daily  nystatin Powder 1 Application(s) Topical three times a day  polyethylene glycol 3350 17 Gram(s) Oral two times a day  senna 2 Tablet(s) Oral at bedtime    MEDICATIONS  (PRN):  acetaminophen     Tablet .. 650 milliGRAM(s) Oral every 6 hours PRN Temp greater or equal to 38C (100.4F), Mild Pain (1 - 3)      LABS:        CAPILLARY BLOOD GLUCOSE          RADIOLOGY & ADDITIONAL TESTS:               *******************************  Alejandra Mota MD (PGY-1)  Internal Medicine  Contact via [employer] TEAMS  *******************************    JUSTIN KEYES  83y  Female    Patient is a 83y old  Female who presents with a chief complaint of Sepsis, perforated gallbladder, sob, poss metastatic Ovarian CA (08 May 2024 15:39)    Subjective: No acute events overnight. Noted breathing stable. Denied any fever, chills, chest pain, sob, ab pain, n/v.     Objective:  T(C): 36.8 (05-09-24 @ 04:05), Max: 36.8 (05-08-24 @ 20:00)  HR: 67 (05-09-24 @ 04:42) (67 - 94)  BP: 126/67 (05-09-24 @ 04:05) (126/67 - 147/77)  RR: 20 (05-09-24 @ 04:05) (18 - 20)  SpO2: 98% (05-09-24 @ 04:42) (90% - 99%)  I&O's Summary    08 May 2024 07:01  -  09 May 2024 07:00  --------------------------------------------------------  IN: 480 mL / OUT: 425 mL / NET: 55 mL    PHYSICAL EXAM:  GENERAL: NAD  HEAD:  Atraumatic, Normocephalic  EYES: EOMI, PERRLA, conjunctiva and sclera clear  ENMT: Moist mucous membranes  NECK: Supple, No JVD, trachea midline   NERVOUS SYSTEM:  Alert & Oriented X3, Good concentration  CHEST/LUNG: Clear to auscultation bilaterally on anterior exam; No rales, rhonchi, wheezing, or rubs  HEART: Regular rate and rhythm; No murmurs, rubs, or gallops  ABDOMEN: Soft, Nontender, Nondistended; Bowel sounds present  EXTREMITIES:  2+ Peripheral Pulses, No clubbing, cyanosis, or edema  SKIN: No rashes or lesions    MEDICATIONS  (STANDING):  albuterol/ipratropium for Nebulization 3 milliLiter(s) Nebulizer every 6 hours  ampicillin  IVPB 2 Gram(s) IV Intermittent every 6 hours  bisacodyl 5 milliGRAM(s) Oral at bedtime  buDESOnide    Inhalation Suspension 0.25 milliGRAM(s) Inhalation two times a day  chlorhexidine 4% Liquid 1 Application(s) Topical daily  enoxaparin Injectable 40 milliGRAM(s) SubCutaneous every 24 hours  ertapenem  IVPB 1000 milliGRAM(s) IV Intermittent every 24 hours  gabapentin 800 milliGRAM(s) Oral every 12 hours  levothyroxine 50 MICROGram(s) Oral daily  loratadine 10 milliGRAM(s) Oral daily  nystatin Powder 1 Application(s) Topical three times a day  polyethylene glycol 3350 17 Gram(s) Oral two times a day  senna 2 Tablet(s) Oral at bedtime    MEDICATIONS  (PRN):  acetaminophen     Tablet .. 650 milliGRAM(s) Oral every 6 hours PRN Temp greater or equal to 38C (100.4F), Mild Pain (1 - 3)    LABS:             10.9   13.97 )-----------( 336      ( 09 May 2024 03:40 )             34.2     05-09    137  |  100  |  16  ----------------------------<  100<H>  4.0   |  26  |  0.52    Ca    8.4      09 May 2024 03:40  Phos  3.4     05-09  Mg     1.9     05-09    TPro  6.4  /  Alb  2.9<L>  /  TBili  0.2  /  DBili  x   /  AST  31  /  ALT  30  /  AlkPhos  83  05-08        PT/INR - ( 09 May 2024 03:40 )   PT: 11.0 sec;   INR: 1.05 ratio    PTT - ( 09 May 2024 03:40 )  PTT:25.9 sec    Culture Results:   Moderate Escherichia coli ESBL  Few Enterococcus faecium (05-03 @ 17:11)  Culture Results:   No growth at 5 days (05-03 @ 01:08)  Culture Results:   No growth at 5 days (05-03 @ 01:08)  Culture Results:   <10,000 CFU/mL Normal Urogenital Ijeoma (04-30 @ 23:24)  Culture Results:   No growth at 5 days (04-30 @ 21:17)  Culture Results:   No growth at 5 days (04-30 @ 21:00)    RADIOLOGY & ADDITIONAL TESTS:

## 2024-05-09 NOTE — PROVIDER CONTACT NOTE (CRITICAL VALUE NOTIFICATION) - SITUATION
Culture positive for moderate E Coli ESBL and few enterococcus faecium
pt culture bile fluids gram negative rods

## 2024-05-09 NOTE — PROGRESS NOTE ADULT - ASSESSMENT
83 female history of COPD not on O2, obesity, HTN, CHF, BCC, hypothyroidism, psoriatic arthritis SUZIE BIBEMS for acute onset SOB, admitted for sepsis secondary to perforated gallbladder s/p perc dimple 5/3 now pending ERCP for choledocholithiases w/ duct dilation. Also found to have complex cystic liver lesion and new left ovarian neoplasm w/ carcinomatosi, pending outpatient IR tissue biopsy. Hospital course complicated w/ AHRF likely 2/2 to COPD exacerbation.

## 2024-05-09 NOTE — ADVANCED PRACTICE NURSE CONSULT - ASSESSMENT
PICC Line Insertion Note  Patient or patient representative educated about central line associated blood stream infection prevention practices.  Catheter type: 4 F, SL Picc  : Bard  Power injectable: Yes  LOT# [id number]    Informed consent obtained by covering floor team.  Procedure assisted by: JUSTO Burkett RN  Time out was preformed, confirming the patient's first and last name, date of birth, procedure, and correct site prior to start of procedure.    Patient was placed with HOB 30 degrees. Patient placement site was prepped with chlorhexidine solution, then draped using maximum sterile barrier protection. The area was injected with 2 ml of 1% lidocaine. Using the Bard Site Rite 8, the catheter was placed using the Modified Seldinger Technique. Strict adherence to outline aseptic technique including handwashing, glove and gown, utilizing mask and cap, plus draping the patient with a sterile drape was observed. Upon completion of line placement, the insertion site was covered with a sterile CHG dressing. Pt tolerated procedure well. V/S: /64, HR 89, TEMP 97.7F, O2sat 92%      All materials used for catheter insertion, including the intact guide wires, were accounted for at the end of the procedure.  Number of attempts: 1  Complications/Comments: None  Emergency Placement: No    Site: New  Anatomical Site of insertion: Right Basilic vein  Catheter size/length: 4F,  38cm  US guided Bard Single lumen power picc placed.    Post procedure verification with chest Xray as per orders.

## 2024-05-09 NOTE — PROGRESS NOTE ADULT - SUBJECTIVE AND OBJECTIVE BOX
Interventional Radiology Follow-Up Note    Patient seen and examined @ bedside    This is a 83 year old Female s/p percutaneous cholecystostomy tube placement on 5/3 in Interventional Radiology with Dr. Madden.     No complaint offered.    Medication:  ampicillin  IVPB: (05-09)  enoxaparin Injectable: (05-08)  ertapenem  IVPB: (05-08)    Vitals:  T(F): 98.4, Max: 98.4 (09:34)  HR: 97  BP: 141/70  RR: 20  SpO2: 94%    Physical Exam:  General: Nontoxic, in NAD  Abdomen: soft, NTND  Drain Device: Drain intact attached to drainage bag. Flushed with 10cc NS w/o difficulty. Dressing clean, dry, intact.   24hr Drain output: 25cc bilious fluid      LABS:  Na: 137 (05-09 @ 03:40), 138 (05-08 @ 07:24), 139 (05-07 @ 07:07)  K: 4.0 (05-09 @ 03:40), 4.0 (05-08 @ 07:24), 4.0 (05-07 @ 07:07)  Cl: 100 (05-09 @ 03:40), 100 (05-08 @ 07:24), 99 (05-07 @ 07:07)  CO2: 26 (05-09 @ 03:40), 26 (05-08 @ 07:24), 29 (05-07 @ 07:07)  BUN: 16 (05-09 @ 03:40), 17 (05-08 @ 07:24), 13 (05-07 @ 07:07)  Cr: 0.52 (05-09 @ 03:40), 0.58 (05-08 @ 07:24), 0.50 (05-07 @ 07:07)  Glu: 100(05-09 @ 03:40), 96(05-08 @ 07:24), 134(05-07 @ 07:07)  Hgb: 10.9 (05-09 @ 03:40), 10.2 (05-08 @ 07:24), 9.5 (05-07 @ 07:07)  Hct: 34.2 (05-09 @ 03:40), 33.3 (05-08 @ 07:24), 30.2 (05-07 @ 07:07)  WBC: 13.97 (05-09 @ 03:40), 11.36 (05-08 @ 07:24), 9.15 (05-07 @ 07:07)  Plt: 336 (05-09 @ 03:40), 325 (05-08 @ 07:24), 281 (05-07 @ 07:07)  INR: 1.05 05-09-24 @ 03:40  PTT: 25.9 05-09-24 @ 03:40        LIVER FUNCTIONS - ( 08 May 2024 07:24 )  Alb: 2.9 g/dL / Pro: 6.4 g/dL / ALK PHOS: 83 U/L / ALT: 30 U/L / AST: 31 U/L / GGT: x         Aspartate Aminotransferase (AST/SGOT): 31 U/L (05-08-24 @ 07:24)  Alanine Aminotransferase (ALT/SGPT): 30 U/L (05-08-24 @ 07:24)  Bilirubin Total: 0.2 mg/dL (05-08-24 @ 07:24)  Bilirubin Total: 0.2 mg/dL (05-07-24 @ 07:07)  Bilirubin Total: 0.3 mg/dL (05-06-24 @ 06:37)  Bilirubin Total: 0.5 mg/dL (05-05-24 @ 07:02)          Assessment/Plan: 83 female history of COPD not on O2, obesity, HTN, CHF, BCC, hypothyroidism, psoriatic arthritis SUZIE BIBEMS for acute onset SOB. 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 Moderate volume ascites. Trace left pleural effusion. Patient is currently s/p percutaneous cholecystostomy tube placement on 5/3 in Interventional Radiology with Dr. Madden.     - WBC 13, pt currently on amp and ertapenem, ID following  - ERCP planned with GI as per note.  - Continue global management per primary team, IR will sign off  - Monitor h/h  - Trend vs/labs  - Flush drain with 10cc NS daily forward only; DO NOT aspirate  - Change dressing q3 days or when dressing is saturated  - Regarding outpatient follow up with IR, if the patient is d/c home with drainage catheter they can make an appointment with IR by calling the IR booking office at [phone]; recommend IR follow in 8 weeks after discharge for tube evaluation.   - They will benefit from VNS service to help with drainage catheter care; they should continue same drainage catheter care as an outpatient.     Please call IR at extension [phone] with any questions, concerns, or issues regarding above.     Also available on TEAMS

## 2024-05-09 NOTE — PROGRESS NOTE ADULT - PROBLEM SELECTOR PLAN 1
CT findings c/f cholelithiasis with perforation and possible abscess. HIDA scan negative. MRCP shows   Choledocholithiasis resulting in biliary ductal dilatation. Also w/ cholelithiasis with discontinuity of the inflamed gallbladder wall contiguous with a 4.3 cm multiloculated collection that is most compatible with perforated cholecystitis with intrahepatic abscess.   s/p perc dimple w/ IR 5/3. bile fluid culture w/ moderate ESBL E coli and few enterococcus faecium.   s/p zosyn 5/1-5/5, meropenem (5/5-6)     - continue ampicillin and ertapenam (5/6- ) for extended course, will need PICC line placement prior to dc   - monitor drain output -> IR follow up in 8 weeks for tube eval/exchange  - f/u ID recs CT findings c/f cholelithiasis with perforation and possible abscess. HIDA scan negative. MRCP shows cholelithiasis with discontinuity of the inflamed gallbladder wall contiguous with a 4.3 cm multiloculated collection that is most compatible with perforated cholecystitis with intrahepatic abscess.   s/p perc dimple w/ IR 5/3. bile fluid culture w/ moderate ESBL E coli and few enterococcus faecium.   s/p zosyn 5/1-5/5, meropenem (5/5-6)     - continue ampicillin and ertapenam (5/6- ) for extended course, will need PICC line placement prior to dc   - monitor drain output -> IR follow up in 8 weeks for tube eval/exchange  - f/u ID recs

## 2024-05-09 NOTE — PROVIDER CONTACT NOTE (CRITICAL VALUE NOTIFICATION) - ACTION/TREATMENT ORDERED:
MD made aware, will continue with plan of care
Continue with current plan of care
pt on Zosyn and starting on Azithromycin

## 2024-05-10 ENCOUNTER — TRANSCRIPTION ENCOUNTER (OUTPATIENT)
Age: 84
End: 2024-05-10

## 2024-05-10 ENCOUNTER — RESULT REVIEW (OUTPATIENT)
Age: 84
End: 2024-05-10

## 2024-05-10 VITALS
DIASTOLIC BLOOD PRESSURE: 66 MMHG | SYSTOLIC BLOOD PRESSURE: 138 MMHG | HEART RATE: 89 BPM | OXYGEN SATURATION: 90 % | RESPIRATION RATE: 18 BRPM | TEMPERATURE: 98 F

## 2024-05-10 LAB
ANION GAP SERPL CALC-SCNC: 10 MMOL/L — SIGNIFICANT CHANGE UP (ref 5–17)
APTT BLD: 27.4 SEC — SIGNIFICANT CHANGE UP (ref 24.5–35.6)
BASOPHILS # BLD AUTO: 0.03 K/UL — SIGNIFICANT CHANGE UP (ref 0–0.2)
BASOPHILS NFR BLD AUTO: 0.3 % — SIGNIFICANT CHANGE UP (ref 0–2)
BUN SERPL-MCNC: 12 MG/DL — SIGNIFICANT CHANGE UP (ref 7–23)
CALCIUM SERPL-MCNC: 8.2 MG/DL — LOW (ref 8.4–10.5)
CHLORIDE SERPL-SCNC: 101 MMOL/L — SIGNIFICANT CHANGE UP (ref 96–108)
CO2 SERPL-SCNC: 26 MMOL/L — SIGNIFICANT CHANGE UP (ref 22–31)
CREAT SERPL-MCNC: 0.53 MG/DL — SIGNIFICANT CHANGE UP (ref 0.5–1.3)
EGFR: 92 ML/MIN/1.73M2 — SIGNIFICANT CHANGE UP
EOSINOPHIL # BLD AUTO: 0.23 K/UL — SIGNIFICANT CHANGE UP (ref 0–0.5)
EOSINOPHIL NFR BLD AUTO: 2 % — SIGNIFICANT CHANGE UP (ref 0–6)
GLUCOSE SERPL-MCNC: 118 MG/DL — HIGH (ref 70–99)
HCT VFR BLD CALC: 32.3 % — LOW (ref 34.5–45)
HGB BLD-MCNC: 10.1 G/DL — LOW (ref 11.5–15.5)
IMM GRANULOCYTES NFR BLD AUTO: 1.4 % — HIGH (ref 0–0.9)
INR BLD: 1.12 RATIO — SIGNIFICANT CHANGE UP (ref 0.85–1.18)
LYMPHOCYTES # BLD AUTO: 1.33 K/UL — SIGNIFICANT CHANGE UP (ref 1–3.3)
LYMPHOCYTES # BLD AUTO: 11.5 % — LOW (ref 13–44)
MAGNESIUM SERPL-MCNC: 1.9 MG/DL — SIGNIFICANT CHANGE UP (ref 1.6–2.6)
MCHC RBC-ENTMCNC: 28.2 PG — SIGNIFICANT CHANGE UP (ref 27–34)
MCHC RBC-ENTMCNC: 31.3 GM/DL — LOW (ref 32–36)
MCV RBC AUTO: 90.2 FL — SIGNIFICANT CHANGE UP (ref 80–100)
MONOCYTES # BLD AUTO: 1 K/UL — HIGH (ref 0–0.9)
MONOCYTES NFR BLD AUTO: 8.6 % — SIGNIFICANT CHANGE UP (ref 2–14)
NEUTROPHILS # BLD AUTO: 8.85 K/UL — HIGH (ref 1.8–7.4)
NEUTROPHILS NFR BLD AUTO: 76.2 % — SIGNIFICANT CHANGE UP (ref 43–77)
NRBC # BLD: 0 /100 WBCS — SIGNIFICANT CHANGE UP (ref 0–0)
PHOSPHATE SERPL-MCNC: 3.5 MG/DL — SIGNIFICANT CHANGE UP (ref 2.5–4.5)
PLATELET # BLD AUTO: 285 K/UL — SIGNIFICANT CHANGE UP (ref 150–400)
POTASSIUM SERPL-MCNC: 4.3 MMOL/L — SIGNIFICANT CHANGE UP (ref 3.5–5.3)
POTASSIUM SERPL-SCNC: 4.3 MMOL/L — SIGNIFICANT CHANGE UP (ref 3.5–5.3)
PROTHROM AB SERPL-ACNC: 12.3 SEC — SIGNIFICANT CHANGE UP (ref 9.5–13)
RBC # BLD: 3.58 M/UL — LOW (ref 3.8–5.2)
RBC # FLD: 15.1 % — HIGH (ref 10.3–14.5)
SODIUM SERPL-SCNC: 137 MMOL/L — SIGNIFICANT CHANGE UP (ref 135–145)
WBC # BLD: 11.6 K/UL — HIGH (ref 3.8–10.5)
WBC # FLD AUTO: 11.6 K/UL — HIGH (ref 3.8–10.5)

## 2024-05-10 PROCEDURE — 71045 X-RAY EXAM CHEST 1 VIEW: CPT

## 2024-05-10 PROCEDURE — C8929: CPT

## 2024-05-10 PROCEDURE — 36569 INSJ PICC 5 YR+ W/O IMAGING: CPT

## 2024-05-10 PROCEDURE — 97116 GAIT TRAINING THERAPY: CPT

## 2024-05-10 PROCEDURE — 97161 PT EVAL LOW COMPLEX 20 MIN: CPT

## 2024-05-10 PROCEDURE — 85730 THROMBOPLASTIN TIME PARTIAL: CPT

## 2024-05-10 PROCEDURE — 85014 HEMATOCRIT: CPT

## 2024-05-10 PROCEDURE — 97530 THERAPEUTIC ACTIVITIES: CPT

## 2024-05-10 PROCEDURE — 85027 COMPLETE CBC AUTOMATED: CPT

## 2024-05-10 PROCEDURE — 74183 MRI ABD W/O CNTR FLWD CNTR: CPT | Mod: MC

## 2024-05-10 PROCEDURE — A9585: CPT

## 2024-05-10 PROCEDURE — 94660 CPAP INITIATION&MGMT: CPT

## 2024-05-10 PROCEDURE — 87075 CULTR BACTERIA EXCEPT BLOOD: CPT

## 2024-05-10 PROCEDURE — 84100 ASSAY OF PHOSPHORUS: CPT

## 2024-05-10 PROCEDURE — 84132 ASSAY OF SERUM POTASSIUM: CPT

## 2024-05-10 PROCEDURE — 82947 ASSAY GLUCOSE BLOOD QUANT: CPT

## 2024-05-10 PROCEDURE — 85025 COMPLETE CBC W/AUTO DIFF WBC: CPT

## 2024-05-10 PROCEDURE — 93306 TTE W/DOPPLER COMPLETE: CPT | Mod: 26

## 2024-05-10 PROCEDURE — 86304 IMMUNOASSAY TUMOR CA 125: CPT

## 2024-05-10 PROCEDURE — 74177 CT ABD & PELVIS W/CONTRAST: CPT | Mod: MC

## 2024-05-10 PROCEDURE — 82435 ASSAY OF BLOOD CHLORIDE: CPT

## 2024-05-10 PROCEDURE — 86850 RBC ANTIBODY SCREEN: CPT

## 2024-05-10 PROCEDURE — 83880 ASSAY OF NATRIURETIC PEPTIDE: CPT

## 2024-05-10 PROCEDURE — 97165 OT EVAL LOW COMPLEX 30 MIN: CPT

## 2024-05-10 PROCEDURE — 87070 CULTURE OTHR SPECIMN AEROBIC: CPT

## 2024-05-10 PROCEDURE — 86301 IMMUNOASSAY TUMOR CA 19-9: CPT

## 2024-05-10 PROCEDURE — 85610 PROTHROMBIN TIME: CPT

## 2024-05-10 PROCEDURE — 78227 HEPATOBIL SYST IMAGE W/DRUG: CPT

## 2024-05-10 PROCEDURE — 80048 BASIC METABOLIC PNL TOTAL CA: CPT

## 2024-05-10 PROCEDURE — 86901 BLOOD TYPING SEROLOGIC RH(D): CPT

## 2024-05-10 PROCEDURE — 81001 URINALYSIS AUTO W/SCOPE: CPT

## 2024-05-10 PROCEDURE — 83735 ASSAY OF MAGNESIUM: CPT

## 2024-05-10 PROCEDURE — A9537: CPT

## 2024-05-10 PROCEDURE — 82330 ASSAY OF CALCIUM: CPT

## 2024-05-10 PROCEDURE — 83605 ASSAY OF LACTIC ACID: CPT

## 2024-05-10 PROCEDURE — 36415 COLL VENOUS BLD VENIPUNCTURE: CPT

## 2024-05-10 PROCEDURE — C1769: CPT

## 2024-05-10 PROCEDURE — 87641 MR-STAPH DNA AMP PROBE: CPT

## 2024-05-10 PROCEDURE — 97110 THERAPEUTIC EXERCISES: CPT

## 2024-05-10 PROCEDURE — 99285 EMERGENCY DEPT VISIT HI MDM: CPT | Mod: 25

## 2024-05-10 PROCEDURE — 71275 CT ANGIOGRAPHY CHEST: CPT | Mod: MC

## 2024-05-10 PROCEDURE — 99232 SBSQ HOSP IP/OBS MODERATE 35: CPT | Mod: GC

## 2024-05-10 PROCEDURE — 99233 SBSQ HOSP IP/OBS HIGH 50: CPT | Mod: GC

## 2024-05-10 PROCEDURE — 96374 THER/PROPH/DIAG INJ IV PUSH: CPT

## 2024-05-10 PROCEDURE — C1729: CPT

## 2024-05-10 PROCEDURE — 82378 CARCINOEMBRYONIC ANTIGEN: CPT

## 2024-05-10 PROCEDURE — 85018 HEMOGLOBIN: CPT

## 2024-05-10 PROCEDURE — 87077 CULTURE AEROBIC IDENTIFY: CPT

## 2024-05-10 PROCEDURE — 87186 SC STD MICRODIL/AGAR DIL: CPT

## 2024-05-10 PROCEDURE — 78830 RP LOCLZJ TUM SPECT W/CT 1: CPT

## 2024-05-10 PROCEDURE — 86900 BLOOD TYPING SEROLOGIC ABO: CPT

## 2024-05-10 PROCEDURE — C1751: CPT

## 2024-05-10 PROCEDURE — 82803 BLOOD GASES ANY COMBINATION: CPT

## 2024-05-10 PROCEDURE — 87086 URINE CULTURE/COLONY COUNT: CPT

## 2024-05-10 PROCEDURE — 87640 STAPH A DNA AMP PROBE: CPT

## 2024-05-10 PROCEDURE — 47490 INCISION OF GALLBLADDER: CPT

## 2024-05-10 PROCEDURE — 80053 COMPREHEN METABOLIC PANEL: CPT

## 2024-05-10 PROCEDURE — 84295 ASSAY OF SERUM SODIUM: CPT

## 2024-05-10 PROCEDURE — 87637 SARSCOV2&INF A&B&RSV AMP PRB: CPT

## 2024-05-10 PROCEDURE — 87205 SMEAR GRAM STAIN: CPT

## 2024-05-10 PROCEDURE — 94640 AIRWAY INHALATION TREATMENT: CPT

## 2024-05-10 PROCEDURE — 87040 BLOOD CULTURE FOR BACTERIA: CPT

## 2024-05-10 PROCEDURE — 72196 MRI PELVIS W/DYE: CPT | Mod: MC

## 2024-05-10 RX ORDER — POLYETHYLENE GLYCOL 3350 17 G/17G
17 POWDER, FOR SOLUTION ORAL
Qty: 0 | Refills: 0 | DISCHARGE
Start: 2024-05-10

## 2024-05-10 RX ORDER — POTASSIUM CHLORIDE 20 MEQ
2 PACKET (EA) ORAL
Refills: 0 | DISCHARGE

## 2024-05-10 RX ORDER — SENNA PLUS 8.6 MG/1
2 TABLET ORAL
Qty: 0 | Refills: 0 | DISCHARGE
Start: 2024-05-10

## 2024-05-10 RX ORDER — AMPICILLIN TRIHYDRATE 250 MG
2 CAPSULE ORAL
Qty: 0 | Refills: 0 | DISCHARGE
Start: 2024-05-10

## 2024-05-10 RX ORDER — LEVOTHYROXINE SODIUM 125 MCG
1 TABLET ORAL
Qty: 0 | Refills: 0 | DISCHARGE

## 2024-05-10 RX ORDER — FAMOTIDINE 10 MG/ML
1 INJECTION INTRAVENOUS
Qty: 0 | Refills: 0 | DISCHARGE

## 2024-05-10 RX ORDER — SODIUM CHLORIDE 9 MG/ML
10 INJECTION INTRAMUSCULAR; INTRAVENOUS; SUBCUTANEOUS
Refills: 0 | Status: DISCONTINUED | OUTPATIENT
Start: 2024-05-10 | End: 2024-05-10

## 2024-05-10 RX ORDER — ERTAPENEM SODIUM 1 G/1
1 INJECTION, POWDER, LYOPHILIZED, FOR SOLUTION INTRAMUSCULAR; INTRAVENOUS
Qty: 0 | Refills: 0 | DISCHARGE
Start: 2024-05-10

## 2024-05-10 RX ORDER — AMLODIPINE BESYLATE 2.5 MG/1
1 TABLET ORAL
Refills: 0 | DISCHARGE

## 2024-05-10 RX ORDER — ERTAPENEM SODIUM 1 G/1
1000 INJECTION, POWDER, LYOPHILIZED, FOR SOLUTION INTRAMUSCULAR; INTRAVENOUS EVERY 24 HOURS
Refills: 0 | Status: COMPLETED | OUTPATIENT
Start: 2024-05-10 | End: 2024-05-10

## 2024-05-10 RX ORDER — AMPICILLIN TRIHYDRATE 250 MG
2 CAPSULE ORAL ONCE
Refills: 0 | Status: COMPLETED | OUTPATIENT
Start: 2024-05-10 | End: 2024-05-10

## 2024-05-10 RX ADMIN — Medication 3 MILLILITER(S): at 11:16

## 2024-05-10 RX ADMIN — Medication 200 GRAM(S): at 06:32

## 2024-05-10 RX ADMIN — Medication 3 MILLILITER(S): at 06:31

## 2024-05-10 RX ADMIN — GABAPENTIN 800 MILLIGRAM(S): 400 CAPSULE ORAL at 06:21

## 2024-05-10 RX ADMIN — Medication 200 GRAM(S): at 00:18

## 2024-05-10 RX ADMIN — ERTAPENEM SODIUM 120 MILLIGRAM(S): 1 INJECTION, POWDER, LYOPHILIZED, FOR SOLUTION INTRAMUSCULAR; INTRAVENOUS at 16:25

## 2024-05-10 RX ADMIN — Medication 50 MICROGRAM(S): at 06:23

## 2024-05-10 RX ADMIN — GABAPENTIN 800 MILLIGRAM(S): 400 CAPSULE ORAL at 17:43

## 2024-05-10 RX ADMIN — Medication 200 GRAM(S): at 11:16

## 2024-05-10 RX ADMIN — NYSTATIN CREAM 1 APPLICATION(S): 100000 CREAM TOPICAL at 06:24

## 2024-05-10 RX ADMIN — Medication 0.25 MILLIGRAM(S): at 17:41

## 2024-05-10 RX ADMIN — Medication 3 MILLILITER(S): at 17:41

## 2024-05-10 RX ADMIN — Medication 200 GRAM(S): at 17:39

## 2024-05-10 RX ADMIN — NYSTATIN CREAM 1 APPLICATION(S): 100000 CREAM TOPICAL at 14:57

## 2024-05-10 RX ADMIN — CHLORHEXIDINE GLUCONATE 1 APPLICATION(S): 213 SOLUTION TOPICAL at 11:20

## 2024-05-10 RX ADMIN — Medication 0.25 MILLIGRAM(S): at 06:23

## 2024-05-10 NOTE — PROGRESS NOTE ADULT - PROBLEM SELECTOR PLAN 8
DVT PPx: Lovenox 40 mg qd  Diet: regular  Bowel Regimen: Miralax/Senna  Code: DNR/DNI w/ trial of intubation   Dispo: pending ERCP friday DVT PPx: Lovenox 40 mg qd  Diet: regular  Bowel Regimen: Miralax/Senna  Code: DNR/DNI w/ trial of intubation   Dispo: return to [facility]

## 2024-05-10 NOTE — PROGRESS NOTE ADULT - SUBJECTIVE AND OBJECTIVE BOX
Interval Events:   No acute events overnight.  Patient without acute symptoms at this time.    ROS:   12 point review of systems performed and negative except otherwise noted in HPI.    Hospital Medications:  acetaminophen     Tablet .. 650 milliGRAM(s) Oral every 6 hours PRN  albuterol/ipratropium for Nebulization 3 milliLiter(s) Nebulizer every 6 hours  ampicillin  IVPB 2 Gram(s) IV Intermittent once  bisacodyl 5 milliGRAM(s) Oral at bedtime  buDESOnide    Inhalation Suspension 0.25 milliGRAM(s) Inhalation two times a day  chlorhexidine 4% Liquid 1 Application(s) Topical daily  ertapenem  IVPB 1000 milliGRAM(s) IV Intermittent every 24 hours  gabapentin 800 milliGRAM(s) Oral every 12 hours  levothyroxine 50 MICROGram(s) Oral daily  loratadine 10 milliGRAM(s) Oral daily  nystatin Powder 1 Application(s) Topical three times a day  polyethylene glycol 3350 17 Gram(s) Oral two times a day  senna 2 Tablet(s) Oral at bedtime  sodium chloride 0.9% lock flush 10 milliLiter(s) IV Push every 1 hour PRN      PHYSICAL EXAM:   Vital Signs:  Vital Signs Last 24 Hrs  T(C): 36.6 (10 May 2024 14:55), Max: 37.2 (09 May 2024 20:55)  T(F): 97.8 (10 May 2024 14:55), Max: 99 (09 May 2024 20:55)  HR: 79 (10 May 2024 14:55) (68 - 88)  BP: 115/71 (10 May 2024 14:55) (115/71 - 138/77)  BP(mean): --  RR: 18 (10 May 2024 14:55) (18 - 20)  SpO2: 93% (10 May 2024 14:55) (90% - 99%)    Parameters below as of 10 May 2024 14:55  Patient On (Oxygen Delivery Method): room air      Daily     Daily     GENERAL: no acute distress  NEURO: alert  HEENT: NCAT, no conjunctival pallor appreciated  CHEST: no respiratory distress, no accessory muscle use  CARDIAC: regular rate, +S1/S2  ABDOMEN: drain with bilious output, soft, nontender, no rebound or guarding  EXTREMITIES: warm, well perfused  SKIN: no lesions noted    LABS: reviewed                        10.1   11.60 )-----------( 285      ( 10 May 2024 04:21 )             32.3     05-10    137  |  101  |  12  ----------------------------<  118<H>  4.3   |  26  |  0.53    Ca    8.2<L>      10 May 2024 04:21  Phos  3.5     05-10  Mg     1.9     05-10          Interval Diagnostic Studies: see sunrise for full report

## 2024-05-10 NOTE — PROGRESS NOTE ADULT - ATTENDING COMMENTS
As above.  Patient seen on 5/8/2024 in the evening.  Discussed with GI fellow earlier in the day.  Impression:    #1.  Perforated cholecystitis resulting in hepatic abscess, status post interventional radiology percutaneous cholecystostomy tube on 5/3/2024    #2.  Consulted for incidental finding of asymptomatic choledocholithiasis on CT imaging.    #3.  COPD with hypoxia, weaned from 4LNC to room air, on steroids    Recommendation:    #1.  Follow CBC/LFTs    #2.  IV antibiotics for problem #1    #3.  Eventual nonurgent ERCP to prevent complications of choledocholithiasis.  Timing will be likely Fri 5/10, less likely Thurs 5/9, may also defer to outpatient as percutaneous cholecystostomy tube can decompress the biliary tree if the stones obstruct.
As above.    Patient seen and examined in the early evening.  Discussed with GI fellow earlier in the day.    Impression:    #1.  Perforated cholecystitis resulting in hepatic abscess, status post interventional radiology drainage on 5/3/2024    #2.  Consulted for incidental finding of asymptomatic choledocholithiasis on CT imaging.    #3.  COPD with hypoxia, weaned from 4LNC to 1LNC but still tachypneic and dyspneic.    Recommendation:    #1.  Follow CBC/LFTs    #2.  IV antibiotics for problem #1    #3.  Eventual nonurgent ERCP to prevent complications of choledocholithiasis.  Timing will be likely near the end of this hospitalization.
As above.  Patient seen on 5/10/2024 in the afternoon  Discussed with GI fellow earlier in the day.    Impression:    #1.  Perforated cholecystitis resulting in hepatic abscess, status post interventional radiology percutaneous cholecystostomy tube on 5/3/2024    #2.  Consulted for incidental finding of asymptomatic choledocholithiasis on CT imaging.  Normal LFTs.  Cholecystostomy tube draining bile, consistent with patent cystic duct.    #3.  COPD with hypoxia, weaned from 4LNC to room air, on steroids      #4.  Echo 5/10/24 demonstrates moderate aortic stenosis.    Recommendation:    #1.  Follow CBC/LFTs while inpatient    #2.  IV antibiotics for problem #1    #3.  Eventual nonurgent ERCP to prevent complications of choledocholithiasis.  Percutaneous cholecystostomy tube draining bile, consistent with patent cystic duct which could decompress biliary tree if choledocholithiasis causes biliary obstruction.  GIven estimated 6 weeks of antibiotics to treat perforated cholecystitis and liver abscess, recent pulmonary COPD exacerbation, and ongoing hypoxia, and suspected metastatic ovarian neoplasm, recommended outpatient followup for reconsideration of ERCP if clinically appropriate.    #4.  Diet as tolerated.
83 female history of COPD not on O2, obesity, HTN, CHF, BCC, hypothyroidism, psoriatic arthritis SUZIE BIBEMS for acute onset SOB, admitted for sepsis secondary to perforated gallbladder. Also found to have complex cystic liver lesion and right ovarian neoplasm w/ carcinomatosis. Hospital course complicated w/ AHRF, unclear etiology but likely related to ongoing abdominal pathology.     Seen and examined at bedside -improved wheezing on exam, on 2LNC with cough  wbc stable - IR to place drain today    Acute Hypoxic Respiratory Failure due to COPD Exacerbation  Gallbladder perforation and abscess seen on mri  likely Ov Ca with mets/carcinomatosis  ?liver mets  Choledocholithiasis    HIDA performed negative but mri positive  Surgery consulted - no acute intervention, d/w Surgery  IR consulted for abscess drainage - do today  GYN rec IR for bx but IR says as outpt  continue Duonebs and budesonide, start on prednisone 40mg and azithro for COPD
83 female history of COPD not on O2, obesity, HTN, CHF, BCC, hypothyroidism, psoriatic arthritis SUZIE BIBEMS for acute onset SOB, admitted for sepsis secondary to perforated gallbladder. Also found to have complex cystic liver lesion and right ovarian neoplasm w/ carcinomatosis. Hospital course complicated w/ AHRF, unclear etiology but likely related to ongoing abdominal pathology.     R/o Gallbladder perforation  R/o Ov Ca with mets/carcinomatosis  r/o liver mets  HIDA performed, f/u read  Surgery consulted - no acute intervention  IR consulted for possible abscess drainage, f/u HIDA  Consult Gyn-Onc, obtain MRI GYN protocol  d/w team in detail
83 female history of COPD not on O2, obesity, HTN, CHF, BCC, hypothyroidism, psoriatic arthritis SUZIE BIBEMS for acute onset SOB, admitted for sepsis secondary to perforated gallbladder. Also found to have complex cystic liver lesion and right ovarian neoplasm w/ carcinomatosis. Hospital course complicated w/ AHRF, unclear etiology but likely related to ongoing abdominal pathology.     Seen and examined at bedside. She appears well. NAD. TTE not performed despite calls.  ERCP canceled by GI    Acute Hypoxic Respiratory Failure due to COPD Exacerbation vs URI with hyperactive airway disease  off Prednisone 40mg, completed Azithromycin  continue Duonebs and budesonide    Gallbladder perforation  - s/p perc dimple placement (Surgery consulted - no acute surgical intervention,)  culture grew ESBL E Coli, continue Ertapenem and Ampicillin, ID consult appreciated  s/p PICC line    Choledocholithiasis  canceled  Pulmonary consulted for clearance, recommendations appreciated    dc back to [facility]  d/w team in detail  rest as above    Ovarian mass   -GYN rec IR for bx; IR rec outpt --> Will get the cancer care navigator to assist with further outpatient follow up    plan of care d/w son at bedside
83 female history of COPD not on O2, obesity, HTN, CHF, BCC, hypothyroidism, psoriatic arthritis SUZIE BIBEMS for acute onset SOB, admitted for sepsis secondary to perforated gallbladder. Also found to have complex cystic liver lesion and right ovarian neoplasm w/ carcinomatosis. Hospital course complicated w/ AHRF, unclear etiology but likely related to ongoing abdominal pathology.     Seen and examined at bedside - wheezing on exam, on 2LNC with cough  RUQ tenderness and persistent pain  wbc trending down    Acute Hypoxic Respiratory Failure due to COPD Exacerbation  Gallbladder perforation and abscess seen on mri  likely Ov Ca with mets/carcinomatosis  ?liver mets  HIDA performed negative but mri positive  Surgery consulted - no acute intervention  IR consulted for abscess drainage, d/w them today, likely to place drain due to RUQ pain but will need anesthesia and currently hypoxic  continue Zosyn  Start Duonebs and budesonide, hold off on prednisone or solumedrol until abscess is drained  F/u Gyn-Onc consult  d/w pt and son in detail at bedside  d/w team in detail
83 female history of COPD not on O2, obesity, HTN, CHF, BCC, hypothyroidism, psoriatic arthritis SUZIE BIBEMS for acute onset SOB, admitted for sepsis secondary to perforated gallbladder. Also found to have complex cystic liver lesion and right ovarian neoplasm w/ carcinomatosis. Hospital course complicated w/ AHRF, unclear etiology but likely related to ongoing abdominal pathology.     Seen and examined at bedside -improved wheezing on exam, on 2LNC with cough  wbc stable - IR to place drain today    Acute Hypoxic Respiratory Failure due to COPD Exacerbation  Gallbladder perforation and abscess seen on mri  likely Ov Ca with mets/carcinomatosis  ?liver mets  Choledocholithiasis    HIDA performed negative but mri positive  Surgery consulted - no acute intervention, d/w Surgery  IR consulted for abscess drainage - do today  GYN rec IR for bx but IR says as outpt  continue Duonebs and budesonide, hold off on prednisone or solumedrol until abscess is drained, if WBC trend down and becomes afebrile with persistent wheezing can then consider PO steroids  F/u Gyn-Onc consult  d/w pt and son in detail at bedside  d/w team in detail
83 female history of COPD not on O2, obesity, HTN, CHF, BCC, hypothyroidism, psoriatic arthritis SUZIE BIBEMS for acute onset SOB, admitted for sepsis secondary to perforated gallbladder. Also found to have complex cystic liver lesion and right ovarian neoplasm w/ carcinomatosis. Hospital course complicated w/ AHRF, unclear etiology but likely related to ongoing abdominal pathology.     Seen and examined at bedside. She appears well. NAD. Wheezing significantly improved.    Acute Hypoxic Respiratory Failure due to COPD Exacerbation  dc Prednisone 40mg, completed Azithromycin  continue Duonebs and budesonide    Gallbladder perforation  - s/p perc dimple placement (Surgery consulted - no acute surgical intervention,)  culture grew ESBL E Coli, continue Ertapenem and Ampicillin, ID consult appreciated    Choledocholithiasis  f/u GI for ERCP - tentatively tomorrow  Pulmonary consult for clearance    Ovarian mass   -GYN rec IR for bx; IR rec outpt --> Will get the cancer care navigator to assist with further outpatient follow up    plan of care d/w son at bedside
83 female history of COPD not on O2, obesity, HTN, CHF, BCC, hypothyroidism, psoriatic arthritis SUZIE BIBEMS for acute onset SOB, admitted for sepsis secondary to perforated gallbladder. Also found to have complex cystic liver lesion and right ovarian neoplasm w/ carcinomatosis. Hospital course complicated w/ AHRF, unclear etiology but likely related to ongoing abdominal pathology.     Seen and examined at bedside. She appears well. NAD. Pending TTE  plan for ERCP tomorrow    Acute Hypoxic Respiratory Failure due to COPD Exacerbation vs URI with hyperactive airway disease  off Prednisone 40mg, completed Azithromycin  continue Duonebs and budesonide    Gallbladder perforation  - s/p perc dimple placement (Surgery consulted - no acute surgical intervention,)  culture grew ESBL E Coli, continue Ertapenem and Ampicillin, ID consult appreciated  PICC line placement ordered    Choledocholithiasis  f/u GI for ERCP - tomorrow  Pulmonary consulted for clearance, recommendations appreciated    Ovarian mass   -GYN rec IR for bx; IR rec outpt --> Will get the cancer care navigator to assist with further outpatient follow up    plan of care d/w son at bedside
83 female history of COPD not on O2, obesity, HTN, CHF, BCC, hypothyroidism, psoriatic arthritis SUZIE BIBEMS for acute onset SOB, admitted for sepsis secondary to perforated gallbladder. Also found to have complex cystic liver lesion and right ovarian neoplasm w/ carcinomatosis. Hospital course complicated w/ AHRF, unclear etiology but likely related to ongoing abdominal pathology.     Seen and examined at bedside. She appears well. NAD. Wheezing resolved, lung clear.   Has seen an allergist in the past for ?Allergic Bronchitis    Acute Hypoxic Respiratory Failure due to COPD Exacerbation vs URI with hyperactive airway disease  off Prednisone 40mg, completed Azithromycin  continue Duonebs and budesonide    Gallbladder perforation  - s/p perc dimple placement (Surgery consulted - no acute surgical intervention,)  culture grew ESBL E Coli, continue Ertapenem and Ampicillin, ID consult appreciated  PICC line placement ordered    Choledocholithiasis  f/u GI for ERCP - tomorrow  Pulmonary consulted for clearance, recommendations appreciated    Ovarian mass   -GYN rec IR for bx; IR rec outpt --> Will get the cancer care navigator to assist with further outpatient follow up    plan of care d/w son at bedside
83 female history of COPD not on O2, obesity, HTN, CHF, BCC, hypothyroidism, psoriatic arthritis SUZIE BIBEMS for acute onset SOB, admitted for sepsis secondary to perforated gallbladder. Also found to have complex cystic liver lesion and right ovarian neoplasm w/ carcinomatosis. Hospital course complicated w/ AHRF, unclear etiology but likely related to ongoing abdominal pathology.       Acute Hypoxic Respiratory Failure due to COPD Exacerbation  -Her breathing has significantly improved continue with Prednisone 40 milligrams to complete a five day course continue with Azithromycin to complete a three day course continue with continue Duonebs and budesonide    Gallbladder perforation  - s/p perc dimple placement (Surgery consulted - no acute surgical intervention,)      Ovarian mass   -GYN rec IR for bx; IR rec outpt --> Will get the cancer care navigator to assist with further outpatient follow up    plan of care d/w son at bedside
83 female history of COPD not on O2, obesity, HTN, CHF, BCC, hypothyroidism, psoriatic arthritis SUZIE BIBEMS for acute onset SOB, admitted for sepsis secondary to perforated gallbladder. Also found to have complex cystic liver lesion and right ovarian neoplasm w/ carcinomatosis. Hospital course complicated w/ AHRF, unclear etiology but likely related to ongoing abdominal pathology.     Seen and examined at bedside. She appears well. NAD. Wheezing significantly improved.    Acute Hypoxic Respiratory Failure due to COPD Exacerbation  continue Prednisone 40mg, complete Azithromycin today  continue Duonebs and budesonide    Gallbladder perforation  - s/p perc dimple placement (Surgery consulted - no acute surgical intervention,)  exudative fluid in bag - culture grew ESBL E Coli, continue Meropenem, consult ID for long term plan    Choledocholithiasis  f/u GI for ERCP    Ovarian mass   -GYN rec IR for bx; IR rec outpt --> Will get the cancer care navigator to assist with further outpatient follow up    plan of care d/w son at bedside

## 2024-05-10 NOTE — DISCHARGE NOTE NURSING/CASE MANAGEMENT/SOCIAL WORK - NSDCPEFALRISK_GEN_ALL_CORE
HC was contacted by a MA from [facility] and asked to arrange for patient's transportation to her appointment on Monday, 11/15/21 @ 10a. HC contacted   [facility] and scheduled transportation for patient . [facility]. phoned patient and informed  her of the information and also sent her a text message. Confirmation for transportation  is #[phone].     Plan of Care  [facility] will follow up with patient for future social needs For information on Fall & Injury Prevention, visit: [email or web address] OR  [email or web address] OR  https://www.cdc.gov/steadi/patient.html

## 2024-05-10 NOTE — PROGRESS NOTE ADULT - SUBJECTIVE AND OBJECTIVE BOX
*******************************  Alejandra Mota MD (PGY-1)  Internal Medicine  Contact via [employer] TEAMS  *******************************    JUSTIN KEYES  83y  Female    Patient is a 83y old  Female who presents with a chief complaint of Sepsis, perforated gallbladder, sob, possible metastatic Ovarian CA (09 May 2024 09:46)      Subjective:    Objective:  T(C): 37 (05-10-24 @ 04:39), Max: 37.2 (05-09-24 @ 20:55)  HR: 71 (05-10-24 @ 06:18) (68 - 97)  BP: 121/65 (05-10-24 @ 04:39) (112/68 - 141/70)  RR: 20 (05-10-24 @ 04:39) (20 - 20)  SpO2: 99% (05-10-24 @ 06:18) (91% - 99%)  I&O's Summary    09 May 2024 07:01  -  10 May 2024 07:00  --------------------------------------------------------  IN: 0 mL / OUT: 200 mL / NET: -200 mL        PHYSICAL EXAM:  GENERAL: NAD  HEAD:  Atraumatic, Normocephalic  EYES: EOMI, PERRLA, conjunctiva and sclera clear  ENMT: Moist mucous membranes  NECK: Supple, No JVD, trachea midline   NERVOUS SYSTEM:  Alert & Oriented X3, Good concentration; Motor Strength 5/5 B/L upper and lower extremities; DTRs 2+ intact and symmetric  CHEST/LUNG: Clear to auscultation bilaterally; No rales, rhonchi, wheezing, or rubs  HEART: Regular rate and rhythm; No murmurs, rubs, or gallops  ABDOMEN: Soft, Nontender, Nondistended; Bowel sounds present  EXTREMITIES:  2+ Peripheral Pulses, No clubbing, cyanosis, or edema  LYMPH: No lymphadenopathy noted  SKIN: No rashes or lesions    MEDICATIONS  (STANDING):  albuterol/ipratropium for Nebulization 3 milliLiter(s) Nebulizer every 6 hours  ampicillin  IVPB 2 Gram(s) IV Intermittent every 6 hours  bisacodyl 5 milliGRAM(s) Oral at bedtime  buDESOnide    Inhalation Suspension 0.25 milliGRAM(s) Inhalation two times a day  chlorhexidine 4% Liquid 1 Application(s) Topical daily  ertapenem  IVPB 1000 milliGRAM(s) IV Intermittent every 24 hours  gabapentin 800 milliGRAM(s) Oral every 12 hours  levothyroxine 50 MICROGram(s) Oral daily  loratadine 10 milliGRAM(s) Oral daily  nystatin Powder 1 Application(s) Topical three times a day  polyethylene glycol 3350 17 Gram(s) Oral two times a day  senna 2 Tablet(s) Oral at bedtime    MEDICATIONS  (PRN):  acetaminophen     Tablet .. 650 milliGRAM(s) Oral every 6 hours PRN Temp greater or equal to 38C (100.4F), Mild Pain (1 - 3)  sodium chloride 0.9% lock flush 10 milliLiter(s) IV Push every 1 hour PRN Pre/post blood products, medications, blood draw, and to maintain line patency      LABS:        CAPILLARY BLOOD GLUCOSE          RADIOLOGY & ADDITIONAL TESTS:               *******************************  Alejandra Mota MD (PGY-1)  Internal Medicine  Contact via [employer] TEAMS  *******************************    JUSTIN KEYES  83y  Female    Patient is a 83y old  Female who presents with a chief complaint of Sepsis, perforated gallbladder, sob, possible metastatic Ovarian CA (09 May 2024 09:46)    Subjective: No acute events overnight. Seen at bedside this morning. Denied any fever, chills, chest pain, sob, ab pain, n/v.     Objective:  T(C): 37 (05-10-24 @ 04:39), Max: 37.2 (05-09-24 @ 20:55)  HR: 71 (05-10-24 @ 06:18) (68 - 97)  BP: 121/65 (05-10-24 @ 04:39) (112/68 - 141/70)  RR: 20 (05-10-24 @ 04:39) (20 - 20)  SpO2: 99% (05-10-24 @ 06:18) (91% - 99%)  I&O's Summary    09 May 2024 07:01  -  10 May 2024 07:00  --------------------------------------------------------  IN: 0 mL / OUT: 200 mL / NET: -200 mL    PHYSICAL EXAM:  GENERAL: NAD, obese   HEAD:  Atraumatic, Normocephalic  EYES: EOMI, PERRLA, conjunctiva and sclera clear  ENMT: Moist mucous membranes  NECK: Supple, No JVD, trachea midline   NERVOUS SYSTEM:  Alert & Oriented X3, Good concentration  CHEST/LUNG: Clear to auscultation bilaterally; No rales, rhonchi, wheezing, or rubs  HEART: Regular rate and rhythm; No murmurs, rubs, or gallops  ABDOMEN: Soft, Nontender, Nondistended; Bowel sounds present, drain in place   EXTREMITIES:  2+ Peripheral Pulses, No clubbing, cyanosis, or edema  SKIN: No rashes or lesions    MEDICATIONS  (STANDING):  albuterol/ipratropium for Nebulization 3 milliLiter(s) Nebulizer every 6 hours  ampicillin  IVPB 2 Gram(s) IV Intermittent every 6 hours  bisacodyl 5 milliGRAM(s) Oral at bedtime  buDESOnide    Inhalation Suspension 0.25 milliGRAM(s) Inhalation two times a day  chlorhexidine 4% Liquid 1 Application(s) Topical daily  ertapenem  IVPB 1000 milliGRAM(s) IV Intermittent every 24 hours  gabapentin 800 milliGRAM(s) Oral every 12 hours  levothyroxine 50 MICROGram(s) Oral daily  loratadine 10 milliGRAM(s) Oral daily  nystatin Powder 1 Application(s) Topical three times a day  polyethylene glycol 3350 17 Gram(s) Oral two times a day  senna 2 Tablet(s) Oral at bedtime    MEDICATIONS  (PRN):  acetaminophen     Tablet .. 650 milliGRAM(s) Oral every 6 hours PRN Temp greater or equal to 38C (100.4F), Mild Pain (1 - 3)  sodium chloride 0.9% lock flush 10 milliLiter(s) IV Push every 1 hour PRN Pre/post blood products, medications, blood draw, and to maintain line patency    LABS:              10.1   11.60 )-----------( 285      ( 10 May 2024 04:21 )             32.3     05-10    137  |  101  |  12  ----------------------------<  118<H>  4.3   |  26  |  0.53    Ca    8.2<L>      10 May 2024 04:21  Phos  3.5     05-10  Mg     1.9     05-10    PT/INR - ( 10 May 2024 04:21 )   PT: 12.3 sec;   INR: 1.12 ratio    PTT - ( 10 May 2024 04:21 )  PTT:27.4 sec    Culture Results:   Moderate Escherichia coli ESBL  Few Enterococcus faecium (05-03 @ 17:11)  Culture Results:   No growth at 5 days (05-03 @ 01:08)  Culture Results:   No growth at 5 days (05-03 @ 01:08)  Culture Results:   <10,000 CFU/mL Normal Urogenital Ijeoma (04-30 @ 23:24)  Culture Results:   No growth at 5 days (04-30 @ 21:17)  Culture Results:   No growth at 5 days (04-30 @ 21:00)    RADIOLOGY & ADDITIONAL TESTS:

## 2024-05-10 NOTE — DISCHARGE NOTE NURSING/CASE MANAGEMENT/SOCIAL WORK - NSDCFUADDAPPT_GEN_ALL_CORE_FT
Interventional Radiology follow up: Please schedule an appointment with Interventional Radiology in 8 weeks for evaluation and exchange of your gallbladder drain. You may call the IR booking office @ [phone] to schedule. Please feel free to contact us at [phone] if any problems arise. After 6PM, Monday through Friday, on weekends and on holidays, please call [phone] and ask for the radiology resident on call to be paged.     APPTS ARE READY TO BE MADE: [X] YES    Best Family or Patient Contact (if needed):    Additional Information about above appointments (if needed):    1: Pulmonary HOME program  2:   3:     Other comments or requests:     Patient advised they did not want to proceed with scheduling appointments with the providers on their referrals. They will coordinate care on their own.

## 2024-05-10 NOTE — PROGRESS NOTE ADULT - ASSESSMENT
83 female history of COPD not on O2, obesity, HTN, CHF, BCC, hypothyroidism, psoriatic arthritis SUZIE BIBEMS for acute onset SOB and found to have sepsis secondary to perforated gallbladder with MRI/MRCP noting a 5.3 cm LEFT ovarian/adnexal mass with multiple omental and peritoneal deposits throughout the abdomen and pelvis, highly suspicious for primary gynecologic malignancy with peritoneal and omental carcinomatosis; cholelithiasis with discontinuity of the inflamed gallbladder wall contiguous with a 4.3 cm multiloculated collection in the adjacent liver in segment 5, most compatible with perforated cholecystitis with intrahepatic abscess and Choledocholithiasis resulting in biliary ductal dilatation prompting GI consult.     #Perforated gallbladder with 4.3 cm multiloculated collection in the adjacent liver in segment 5  - s/p IR drainage - fluid was positive for ESBL, currently on meropenem. Blood Cultures NGTD.     #CBD dilation w/ choledocholithiasis    #LEFT ovarian/adnexal mass with multiple omental and peritoneal deposits throughout the abdomen and pelvis, highly suspicious for primary   gynecologic malignancy with peritoneal and omental carcinomatosis - planning for o/p biopsy.     #Acute hypoxic respiratory failure 2/2 COPD, improved  - currently on steroids started on 5/4    Recommendations:  -trend clinical symptoms, exam findings, vital signs, CBC, CMP, INR  -patient high risk from lung perspective however pulmonology states is optimized for ERCP  -given adequate biliary drainage, would favor treating with IV antibiotics and follow up as outpatient for nonurgent ERCP; myself and attending physician spoke with patient and son(s); will arrange telehealth visit in 2 weeks and make decision on timing of ERCP at that time  -no further plans for inpatient interventions from advanced GI perspective  -will sign off at this time, please call back with questions or if new issues arise    Note incomplete until finalized by attending signature/attestation.    Cr Rocha  GI/Hepatology Fellow    MONDAY-FRIDAY 8AM-5PM:  Pager# [phone] ([facility]) or [phone] ([facility])    NON-URGENT CONSULTS:  Please email [email or web address]  AT NIGHT AND ON WEEKENDS:  Contact on-call GI fellow via answering service ([phone]) from 5pm-8am and on weekends/holidays

## 2024-05-10 NOTE — PROGRESS NOTE ADULT - PROBLEM SELECTOR PLAN 1
CT findings c/f cholelithiasis with perforation and possible abscess. HIDA scan negative. MRCP shows cholelithiasis with discontinuity of the inflamed gallbladder wall contiguous with a 4.3 cm multiloculated collection that is most compatible with perforated cholecystitis with intrahepatic abscess.   s/p perc dimple w/ IR 5/3. bile fluid culture w/ moderate ESBL E coli and few enterococcus faecium.   s/p zosyn 5/1-5/5, meropenem (5/5-6)     - continue ampicillin and ertapenam (5/6- ) for extended course, will need PICC line placement prior to dc   - monitor drain output -> IR follow up in 8 weeks for tube eval/exchange  - f/u ID recs CT findings c/f cholelithiasis with perforation and possible abscess. HIDA scan negative. MRCP shows cholelithiasis with discontinuity of the inflamed gallbladder wall contiguous with a 4.3 cm multiloculated collection that is most compatible with perforated cholecystitis with intrahepatic abscess.   s/p perc dimple w/ IR 5/3. bile fluid culture w/ moderate ESBL E coli and few enterococcus faecium.   s/p zosyn 5/1-5/5, meropenem (5/5-6)     - continue ampicillin and ertapenam (5/6- ) for 6 weeks, PICC line placed 5/9   - monitor drain output -> IR follow up in 8 weeks for tube eval/exchange  - f/u ID recs

## 2024-05-10 NOTE — DISCHARGE NOTE NURSING/CASE MANAGEMENT/SOCIAL WORK - PATIENT PORTAL LINK FT
You can access the FollowMyHealth Patient Portal offered by [facility] by registering at the following website: [email or web address]. By joining [facility]’s FollowMyHealth portal, you will also be able to view your health information using other applications (apps) compatible with our system.

## 2024-05-10 NOTE — PROGRESS NOTE ADULT - PROBLEM SELECTOR PLAN 2
MRCP showed choledocholithiasis resulting in biliary ductal dilatation. Tbili, LFTs wnl.     - f/u GI recs -> tentative ERCP 5/9  - pulm consulted ok to proceed  - TTE requested for full workup of clearance MRCP showed choledocholithiasis resulting in biliary ductal dilatation. Tbili, LFTs wnl.     - f/u GI recs -> now deferring for outpatient ERCP to minimize risk of complications   - pulm consulted ok to proceed

## 2024-05-10 NOTE — PROGRESS NOTE ADULT - NUTRITIONAL ASSESSMENT
This patient has been assessed with a concern for Malnutrition and has been determined to have a diagnosis/diagnoses of Morbid obesity (BMI > 40).    This patient is being managed with:   Diet NPO after Midnight-     NPO Start Date: 08-May-2024   NPO Start Time: 23:59  Except Medications  Entered: May  8 2024  2:06PM    Diet Regular-  Entered: May  2 2024  4:35PM  
This patient has been assessed with a concern for Malnutrition and has been determined to have a diagnosis/diagnoses of Morbid obesity (BMI > 40).    This patient is being managed with:   Diet Regular-  Entered: May  2 2024  4:35PM  
This patient has been assessed with a concern for Malnutrition and has been determined to have a diagnosis/diagnoses of Morbid obesity (BMI > 40).    This patient is being managed with:   Diet Regular-  Entered: May  2 2024  4:35PM  
This patient has been assessed with a concern for Malnutrition and has been determined to have a diagnosis/diagnoses of Morbid obesity (BMI > 40).    This patient is being managed with:   Diet NPO after Midnight-     NPO Start Date: 02-May-2024   NPO Start Time: 23:59  Except Medications  Entered: May  2 2024  4:35PM    Diet Regular-  Entered: May  2 2024  4:35PM  
This patient has been assessed with a concern for Malnutrition and has been determined to have a diagnosis/diagnoses of Morbid obesity (BMI > 40).    This patient is being managed with:   Diet Regular-  Entered: May  2 2024  4:35PM  
This patient has been assessed with a concern for Malnutrition and has been determined to have a diagnosis/diagnoses of Morbid obesity (BMI > 40).    This patient is being managed with:   Diet Regular-  Entered: May  2 2024  4:35PM  
This patient has been assessed with a concern for Malnutrition and has been determined to have a diagnosis/diagnoses of Morbid obesity (BMI > 40).    This patient is being managed with:   Diet NPO after Midnight-     NPO Start Date: 09-May-2024   NPO Start Time: 23:59  Except Medications  Entered: May  9 2024 12:11PM    Diet Regular-  Entered: May  2 2024  4:35PM  
This patient has been assessed with a concern for Malnutrition and has been determined to have a diagnosis/diagnoses of Morbid obesity (BMI > 40).    This patient is being managed with:   Diet Regular-  Entered: May  2 2024  4:35PM

## 2024-05-10 NOTE — PROGRESS NOTE ADULT - TIME BILLING
choledocholithiasis, perforated cholecystitis with hepatic abscess, review of chart and images, coordination of care, and documentation
- Ordering, reviewing, and interpreting labs, testing, and imaging.  - Independently obtaining a review of systems and performing a physical exam  - Reviewing prior hospitalization and where necessary, outpatient records.  - Counselling and educating patient and family regarding interpretation of aforementioned items and plan of care.

## 2024-05-10 NOTE — PROGRESS NOTE ADULT - REASON FOR ADMISSION
Sepsis, perforated gallbladder, sob, poss metastatic Ovarian CA
Sepsis, perforated gallbladder, sob, poss metastatic Ovarian CA
Sepsis, perforated gallbladder, sob, possible metastatic Ovarian CA
Sepsis, perforated gallbladder, sob, poss metastatic Ovarian CA
Sepsis, perforated gallbladder, sob, poss metastatic Ovarian CA
Sepsis, perforated gallbladder, metastatic Ovarian CA
Sepsis, perforated gallbladder, sob, poss metastatic Ovarian CA

## 2024-05-10 NOTE — PROGRESS NOTE ADULT - PROVIDER SPECIALTY LIST ADULT
Gastroenterology
Intervent Radiology
Gastroenterology
Infectious Disease
Internal Medicine
Internal Medicine
Intervent Radiology
Gastroenterology
Gastroenterology
Internal Medicine
Intervent Radiology
Intervent Radiology
Internal Medicine

## 2024-05-10 NOTE — PROGRESS NOTE ADULT - PROBLEM SELECTOR PLAN 4
Pt previously treated at [facility] for pneumonia. Also w/ hx of COPD not on any medications or home medications. Suspect AHRF 2/2 to COPD exacerbation.  s/p azithromycin x 3days, prednisone 40 x 4 days   RESOLVED     - continue duonebs, budesonide  - wean oxygen as tolerated  - CPAP at night for SUZIE

## 2024-05-10 NOTE — PROGRESS NOTE ADULT - SUBJECTIVE AND OBJECTIVE BOX
[facility]  Division of Infectious Diseases  [phone]    Name: JUSTIN KEYES  Age: 83y  Gender: Female  MRN: [id number]    Interval History--  Notes reviewed.     Past Medical History--  Hypothyroid    Chronic bronchitis    Psoriatic arthritis    Osteoarthritis    Overactive bladder    Basal cell carcinoma    Squamous cell carcinoma    History of carpal tunnel release    H/O total shoulder replacement, left    H/O:         For details regarding the patient's social history, family history, and other miscellaneous elements, please refer the initial infectious diseases consultation and/or the admitting history and physical examination for this admission.    Allergies    latex (Rash)  erythromycin (Rash)  phenobarbital (Rash)    Intolerances    Milk (Rash)      Medications--  Antibiotics:  ampicillin  IVPB 2 Gram(s) IV Intermittent every 6 hours  ertapenem  IVPB 1000 milliGRAM(s) IV Intermittent every 24 hours    Immunologic:    Other:  acetaminophen     Tablet .. PRN  albuterol/ipratropium for Nebulization  bisacodyl  buDESOnide    Inhalation Suspension  chlorhexidine 4% Liquid  gabapentin  levothyroxine  loratadine  nystatin Powder  polyethylene glycol 3350  senna  sodium chloride 0.9% lock flush PRN      Review of Systems--  A 10-point review of systems was obtained.     Pertinent positives and negatives--  Constitutional: No fevers. No Chills. No Rigors.   Cardiovascular: No chest pain. No palpitations.  Respiratory: No shortness of breath. No cough.  Gastrointestinal: No nausea or vomiting. No diarrhea or constipation.   Psychiatric: Pleasant. Appropriate affect.    Review of systems otherwise negative except as previously noted.    Physical Examination--  Vital Signs: T(F): 98.6 (05-10-24 @ 04:39), Max: 99 (24 @ 20:55)  HR: 83 (05-10-24 @ 08:47)  BP: 121/65 (05-10-24 @ 04:39)  RR: 20 (05-10-24 @ 08:47)  SpO2: 90% (05-10-24 @ 08:47)  Wt(kg): --  General: Nontoxic-appearing Female in no acute distress.  HEENT: AT/NC. PERRL. EOMI. Anicteric. Conjunctiva pink and moist. Oropharynx clear. Dentition fair.  Neck: Not rigid. No sense of mass.  Nodes: None palpable.  Lungs: Clear bilaterally without rales, wheezing or rhonchi  Heart: Regular rate and rhythm. No Murmur. No rub. No gallop. No palpable thrill.  Abdomen: Bowel sounds present and normoactive. Soft. Nondistended. Nontender. No sense of mass. No organomegaly.  Back: No spinal tenderness. No costovertebral angle tenderness.   Extremities: No cyanosis or clubbing. No edema.   Skin: Warm. Dry. Good turgor. No rash. No vasculitic stigmata.  Psychiatric: Appropriate affect and mood for situation.         Laboratory Studies--  CBC                        10.1   11.60 )-----------( 285      ( 10 May 2024 04:21 )             32.3       Chemistries  05-10    137  |  101  |  12  ----------------------------<  118<H>  4.3   |  26  |  0.53    Ca    8.2<L>      10 May 2024 04:21  Phos  3.5     05-10  Mg     1.9     05-10        Culture Data    Culture - Body Fluid with Gram Stain (collected 03 May 2024 17:11)  Source: Bile Bile Fluid  Gram Stain (04 May 2024 06:18):    polymorphonuclear leukocytes seen    Gram Negative Rods seen    by cytocentrifuge  Final Report (08 May 2024 22:14):    Moderate Escherichia coli ESBL    Few Enterococcus faecium  Organism: Escherichia coli ESBL  Enterococcus faecium (08 May 2024 22:14)  Organism: Enterococcus faecium (08 May 2024 22:14)  Organism: Escherichia coli ESBL (08 May 2024 22:14)

## 2024-05-13 ENCOUNTER — NON-APPOINTMENT (OUTPATIENT)
Age: 84
End: 2024-05-13

## 2024-05-13 DIAGNOSIS — K82.A2 PERFORATION OF GALLBLADDER IN CHOLECYSTITIS: ICD-10-CM

## 2024-05-14 ENCOUNTER — APPOINTMENT (OUTPATIENT)
Dept: INTERVENTIONAL RADIOLOGY/VASCULAR | Facility: CLINIC | Age: 84
End: 2024-05-14
Payer: MEDICARE

## 2024-05-14 DIAGNOSIS — R93.89 ABNORMAL FINDINGS ON DIAGNOSTIC IMAGING OF OTHER SPECIFIED BODY STRUCTURES: ICD-10-CM

## 2024-05-14 PROCEDURE — 99205 OFFICE O/P NEW HI 60 MIN: CPT

## 2024-05-14 NOTE — PHYSICAL EXAM
[Alert] : alert [Healthy Appearance] : healthy appearance [Normal Voice/Communication] : normal voice communication [No Respiratory Distress] : no respiratory distress [Oriented x3] : oriented to person, place, and time [Normal Insight/Judgement] : insight and judgment were intact [Normal Affect] : the affect was normal [Normal Mood] : the mood was normal [Recent Memory Normal] : recent memory was not impaired [Remote Memory Normal] : remote memory was not impaired [Completely disabled. Cannot carry on any selfcare. Totally confined to bed or chair] : Completely disabled. Cannot carry on any selfcare. Totally confined to bed or chair

## 2024-05-15 NOTE — REVIEW OF SYSTEMS
[Negative] : Heme/Lymph [Eyesight Problems] : eyesight problems [Limb Weakness] : limb weakness [Difficulty Walking] : difficulty walking [Anxiety] : anxiety [As Noted in HPI] : as noted in HPI [FreeTextEntry3] : + glasses

## 2024-05-15 NOTE — HISTORY OF PRESENT ILLNESS
[0] : ~His/Her~ pain was 0 out of 10 [Other Location: e.g. School (Enter Location, City,State)___] : at [unfilled], at the time of the visit. [Medical Office: ([facility])___] : at the medical office located in  [Family Member] : family member [Verbal consent obtained from patient] : the patient, [unfilled] [FreeTextEntry1] : Son Zhao (HCP) participated in this consultation.  his is an 84 y/o female history of COPD now  on O2 via nasal cannula, morbid obesity, SUZIE (+ cpap), HTN, CHF,  hypothyroidism, psoriatic arthritis,  BCC, currently on IV abx via RUE PICC for presumed PNA, admitted via the ED from 5/1 - 5/10 for acute onset SOB, found to have perforated gallbladder with cholecystitis & choledocholithiasis s/p cholecystostomy tube 5/3, and a new 5.3 cm left ovarian/adnexal mass with multiple omental and peritoneal deposits throughout the abdomen and pelvis suspicious for gynecologic malignancy, who presents to IR for CT guided biopsy consultation, as recommended by GYN while she was an inpatient.  Today, Ms Whittaker reports feeling increasingly better each day.  Denies fever, chills, abd pain. States all is well with dimple tube - denies flushing issues or site leakage, s/s infection - + bilious output.  Reports prophylactic ASA 81 mg usage.   Hx: Pt was admitted from [facility] where she is long term resident, and she had increased work of breathing for 1 day.  Per ED report, patient was diagnosed recently with left-sided pneumonia, receiving IV zosyn and IVF at the facility.  She reported weakness along with emesis around 5 days ago improved though she still reports poor appetite. She also reports right-sided abdominal pain and a dry cough which she suspects is related to allergies. Denied CP, sore throat, nausea, urinary/bowel changes except constipation, numbness/tingling, leg swelling. In the ED, patient found to be febrile to 38.4, HR 94, maintained on NC, given CTX, APAP, Nebs, with urine and blood cultures were sent.   Hospital Course 5/1 - 5/10/24: Patient was admitted for AHRF 2/2 to URI vs COPD exacerbation and sepsis 2/2 to perforated gallbladder. Also found to have likely new metastatic ovarian cancer.  # Perforated Gallbladder #Choledocholithiasis - on imaging patient found to have cholelithiasis with discontinuity of the inflamed gallbladder wall contiguous with a 4.3 cm multiloculated collection in the adjacent liver, most compatible with perforated cholecystitis with intrahepatic abscess.  -underwent a percutaneous cholecystostomy with IR on 5/3. Bile culture + for ESBL E coli and few enterococcus faecium. - ID was consulted, recommended to continue ertapenam and ampicillin for a 6 week course (until 6/17). - MRCP also demonstrated choledocholithiasis resulting in biliary ductal dilatation. GI was consulted- recommended outpatient ERCP.  # New Left Ovarian Mass - on imaging, found to have a 5.3 cm left ovarian/adnexal mass with multiple omental and peritoneal deposits throughout the abdomen and pelvis, highly suspicious for primary gynecologic malignancy with peritoneal and omental carcinomatosis - GYN & IR consulted - recommended outpatient tissue biopsy  #AHRF, COPD Exacerbation - initially required 4L of NC on admission w/ wheezing on exam.  - received 3 days of azithromycin and prednisone for treatment of COPD exacerbation.  - breathing improved was transitioned to room air prior to discharge - resumed O2 via NC as of 5/13 for SOB - denies cough / hemoptysis  Important Medication Changes: - continue ertapenem 1Gm qd until 6/17 - continue ampicillin 2g Gms q6h until 6/17 - duonebs as needed for wheezing  alendronate 70 mg oral tablet: 1 tab(s) orally once a week on Sundays ampicillin: 2 gram(s) intravenous every 6 hours until 6/17 Aspirin Enteric Coated 81 mg oral delayed release tablet: 1 tab(s) orally once a day baclofen 10 mg oral tablet: 1 tab(s) orally 3 times a day, As Needed Calcium 500+D oral tablet, chewable: 1 tab(s) orally 2 times a day ertapenem 1 g injection: 1 gram(s) injectable once a day ertapenem 1g qd until  Flonase 50 mcg/inh nasal spray: 2 spray(s) nasal once a day furosemide 20 mg oral tablet: 1 tab(s) orally once a day gabapentin 800 mg oral tablet: 1 tab(s) orally 3 times a day loratadine 10 mg oral tablet: 1 tab(s) orally once a day metoprolol tartrate 25 mg oral tablet: 0.5 tab(s) orally every 12 hours oxyBUTYnin 10 mg/24 hr oral tablet, extended release: 1 tab(s) orally once a day polyethylene glycol 3350 oral powder for reconstitution: 17 gram(s) orally 2 times a day pravastatin 10 mg oral tablet: 1 tab(s) orally once a day (at bedtime) senna leaf extract oral tablet: 2 tab(s) orally once a day (at bedtime) Singulair 10 mg oral tablet: 1 tab(s) orally once a day sulfaSALAzine 500 mg oral delayed release tablet: 2 tab(s) orally every 12 hours Synthroid 50 mcg (0.05 mg) oral tablet: 1 tab(s) orally once a day   ID: Dr Vincent Skinner [phone] GYN: Dr Dana Christensen (when an [facility] inpatient) GI: Dr Jose Maria Cotter [phone] Pulm: Dr Mathew Mckeon [phone] Cardiac: has a Dr in [facility] [phone]

## 2024-05-15 NOTE — HISTORY OF PRESENT ILLNESS
[0] : ~His/Her~ pain was 0 out of 10 [Other Location: e.g. School (Enter Location, City,State)___] : at [unfilled], at the time of the visit. [Medical Office: ([facility])___] : at the medical office located in  [Family Member] : family member [Verbal consent obtained from patient] : the patient, [unfilled] [FreeTextEntry1] : Son Zhao (HCP) participated in this consultation.  his is an 82 y/o female history of COPD now  on O2 via nasal cannula, morbid obesity, SUZIE (+ cpap), HTN, CHF,  hypothyroidism, psoriatic arthritis,  BCC, currently on IV abx via RUE PICC for presumed PNA, admitted via the ED from 5/1 - 5/10 for acute onset SOB, found to have perforated gallbladder with cholecystitis & choledocholithiasis s/p cholecystostomy tube 5/3, and a new 5.3 cm left ovarian/adnexal mass with multiple omental and peritoneal deposits throughout the abdomen and pelvis suspicious for gynecologic malignancy, who presents to IR for CT guided biopsy consultation, as recommended by GYN while she was an inpatient.  Today, Ms Whittaker reports feeling increasingly better each day.  Denies fever, chills, abd pain. States all is well with dimple tube - denies flushing issues or site leakage, s/s infection - + bilious output.  Reports prophylactic ASA 81 mg usage.   Hx: Pt was admitted from [facility] where she is long term resident, and she had increased work of breathing for 1 day.  Per ED report, patient was diagnosed recently with left-sided pneumonia, receiving IV zosyn and IVF at the facility.  She reported weakness along with emesis around 5 days ago improved though she still reports poor appetite. She also reports right-sided abdominal pain and a dry cough which she suspects is related to allergies. Denied CP, sore throat, nausea, urinary/bowel changes except constipation, numbness/tingling, leg swelling. In the ED, patient found to be febrile to 38.4, HR 94, maintained on NC, given CTX, APAP, Nebs, with urine and blood cultures were sent.   Hospital Course 5/1 - 5/10/24: Patient was admitted for AHRF 2/2 to URI vs COPD exacerbation and sepsis 2/2 to perforated gallbladder. Also found to have likely new metastatic ovarian cancer.  # Perforated Gallbladder #Choledocholithiasis - on imaging patient found to have cholelithiasis with discontinuity of the inflamed gallbladder wall contiguous with a 4.3 cm multiloculated collection in the adjacent liver, most compatible with perforated cholecystitis with intrahepatic abscess.  -underwent a percutaneous cholecystostomy with IR on 5/3. Bile culture + for ESBL E coli and few enterococcus faecium. - ID was consulted, recommended to continue ertapenam and ampicillin for a 6 week course (until 6/17). - MRCP also demonstrated choledocholithiasis resulting in biliary ductal dilatation. GI was consulted- recommended outpatient ERCP.  # New Left Ovarian Mass - on imaging, found to have a 5.3 cm left ovarian/adnexal mass with multiple omental and peritoneal deposits throughout the abdomen and pelvis, highly suspicious for primary gynecologic malignancy with peritoneal and omental carcinomatosis - GYN & IR consulted - recommended outpatient tissue biopsy  #AHRF, COPD Exacerbation - initially required 4L of NC on admission w/ wheezing on exam.  - received 3 days of azithromycin and prednisone for treatment of COPD exacerbation.  - breathing improved was transitioned to room air prior to discharge - resumed O2 via NC as of 5/13 for SOB - denies cough / hemoptysis  Important Medication Changes: - continue ertapenem 1Gm qd until 6/17 - continue ampicillin 2g Gms q6h until 6/17 - duonebs as needed for wheezing  alendronate 70 mg oral tablet: 1 tab(s) orally once a week on Sundays ampicillin: 2 gram(s) intravenous every 6 hours until 6/17 Aspirin Enteric Coated 81 mg oral delayed release tablet: 1 tab(s) orally once a day baclofen 10 mg oral tablet: 1 tab(s) orally 3 times a day, As Needed Calcium 500+D oral tablet, chewable: 1 tab(s) orally 2 times a day ertapenem 1 g injection: 1 gram(s) injectable once a day ertapenem 1g qd until  Flonase 50 mcg/inh nasal spray: 2 spray(s) nasal once a day furosemide 20 mg oral tablet: 1 tab(s) orally once a day gabapentin 800 mg oral tablet: 1 tab(s) orally 3 times a day loratadine 10 mg oral tablet: 1 tab(s) orally once a day metoprolol tartrate 25 mg oral tablet: 0.5 tab(s) orally every 12 hours oxyBUTYnin 10 mg/24 hr oral tablet, extended release: 1 tab(s) orally once a day polyethylene glycol 3350 oral powder for reconstitution: 17 gram(s) orally 2 times a day pravastatin 10 mg oral tablet: 1 tab(s) orally once a day (at bedtime) senna leaf extract oral tablet: 2 tab(s) orally once a day (at bedtime) Singulair 10 mg oral tablet: 1 tab(s) orally once a day sulfaSALAzine 500 mg oral delayed release tablet: 2 tab(s) orally every 12 hours Synthroid 50 mcg (0.05 mg) oral tablet: 1 tab(s) orally once a day   ID: Dr Vincent Skinner [phone] GYN: Dr Dana Christensen (when an [facility] inpatient) GI: Dr Jose Maria Cotter [phone] Pulm: Dr Mathew Mckeon [phone] Cardiac: has a Dr in [facility] [phone]

## 2024-05-15 NOTE — ADDENDUM
[FreeTextEntry1] : 5/15/24: spoke to Kim Hammond NP this morning re biopsy & requisite requirements - she will arrange pulm & cardiac clearances & address ASA 5 day hold feasibility & will fax reports to bkg office (tel & fax #s provided). IR consult faxed to her for review. SS order updated to reflect the above. ALEX NINO-BC

## 2024-05-15 NOTE — ASSESSMENT
[Other: _____] : [unfilled] [FreeTextEntry1] : This is an 82 y/o female history of COPD now  on O2 via nasal cannula, morbid obesity, SUZIE (+ cpap), HTN, CHF,  hypothyroidism, psoriatic arthritis,  BCC, currently on IV abx via RUE PICC for presumed PNA, admitted via the ED from 5/1 - 5/10 for acute onset SOB, found to have perforated gallbladder with cholecystitis & choledocholithiasis s/p cholecystostomy tube 5/3, and a new 5.3 cm left ovarian/adnexal mass with multiple omental and peritoneal deposits throughout the abdomen and pelvis suspicious for gynecologic malignancy, who presents to IR for CT guided biopsy consultation, as recommended by GYN while she was an inpatient.  # New Left Ovarian Mass - on imaging, found to have a 5.3 cm left ovarian/adnexal mass with multiple omental and peritoneal deposits throughout the abdomen and pelvis, highly suspicious for primary gynecologic malignancy with peritoneal and omental carcinomatosis  - GYN Dr Christensen consulted the pt while inhouse - recommended outpatient tissue biopsy as d/w IR - presents to IR today for outpatient CT guided biopsy, for definitive diagnosis to aid in formulating plan of care - imaging was reviewed was reviewed by Dr Madden & I discussed the results with the pt & son - procedural benefits, alternatives, risks (bleeding & infection) & expected postprocedural course were discussed at great length - performed with sedation from the anesthesia team -  A cytology tech will be present to evaluate bx samples to determine if adequate tissue is present for diagnosis - biopsy results typically take 3-5 business days or longer if special testing is performed & results will be sent to your GYN Dr who will review them with you - Labs from 5/12 & 5/10- cbc, cmp, inr - have been reviewed  - the pt expressed that she is open to exploring treatment options should bx be positive for malignancy - will need to hold prophylactic ASA 5 days pre op to aid in mitigating bleeding risk  - I will be in touch with [employer] coordinator Kim Multani NP   # CHF - recent exacerbation during admission from 5/1 - 5/10 - denies cardiac symptomatology since discharge - managed by An[facility] Candelario cardiac  - pt unaware of name - on ASA regimen - will discuss feasibility of holding ASA  5 days pre op to aid in mitigating bleeding risk with cardiac Dr - 5/10/24 TTE reviewed - will require cardiac clearance - [facility] office to obtain it for review  # COPD, PNA - PNA diagnosed during admission from 5/1 - 5/10 - on IV abx via RUE PICC until 6/17 per ID - now on O2 via NC - instructed to bring inhaler(s) dos - unaware of names - will bring list to PST - airway evaluation & management as per PST / Anesthesia - will require pulmonary clearance - [facility] office to obtain it for review  # SUZIE - on nocturnal CPAP regimen - airway eval & mgmt as per PST/Anesthesia team  # Perforated Gallbladder, Choledocholithiasis - on imaging while inhouse found to have cholelithiasis with discontinuity of the inflamed gallbladder wall contiguous with a 4.3 cm multiloculated collection in the adjacent liver, most compatible with perforated cholecystitis with intrahepatic abscess   - underwent a percutaneous cholecystostomy with IR on 5/3. Bile culture + for ESBL E coli & enterococcus faecium - ID was consulted, recommended to continue ertapenem and ampicillin for a 6 week course (until 6/17) - MRCP also demonstrated choledocholithiasis resulting in biliary ductal dilatation - GI was consulted - recommended outpatient ERCP - pt to f/u as outpatient per discharge instructions - denies fever, chills, abd pain  - states all is well with dimple tube - denies flushing issues or site leakage, s/s infection - + bilious output - IR f/u for dimple tube check as per discharge instructions or as clinically indicated   Ms. KEYES's & son's comprehension was confirmed & all questions were asked and answered to their satisfaction.  The pt would like to move forward with the proposed biopsy.   IR contact information was provided to the pt should there be additional questions or concerns.  The pt was informed that our booking office will contact her to schedule PSTs & biopsy procedure.  Above case & plan discussed with Dr Madden.

## 2024-05-15 NOTE — ASSESSMENT
[Other: _____] : [unfilled] [FreeTextEntry1] : This is an 82 y/o female history of COPD now  on O2 via nasal cannula, morbid obesity, SUZIE (+ cpap), HTN, CHF,  hypothyroidism, psoriatic arthritis,  BCC, currently on IV abx via RUE PICC for presumed PNA, admitted via the ED from 5/1 - 5/10 for acute onset SOB, found to have perforated gallbladder with cholecystitis & choledocholithiasis s/p cholecystostomy tube 5/3, and a new 5.3 cm left ovarian/adnexal mass with multiple omental and peritoneal deposits throughout the abdomen and pelvis suspicious for gynecologic malignancy, who presents to IR for CT guided biopsy consultation, as recommended by GYN while she was an inpatient.  # New Left Ovarian Mass - on imaging, found to have a 5.3 cm left ovarian/adnexal mass with multiple omental and peritoneal deposits throughout the abdomen and pelvis, highly suspicious for primary gynecologic malignancy with peritoneal and omental carcinomatosis  - GYN Dr Christensen consulted the pt while inhouse - recommended outpatient tissue biopsy as d/w IR - presents to IR today for outpatient CT guided biopsy, for definitive diagnosis to aid in formulating plan of care - imaging was reviewed was reviewed by Dr Madden & I discussed the results with the pt & son - procedural benefits, alternatives, risks (bleeding & infection) & expected postprocedural course were discussed at great length - performed with sedation from the anesthesia team -  A cytology tech will be present to evaluate bx samples to determine if adequate tissue is present for diagnosis - biopsy results typically take 3-5 business days or longer if special testing is performed & results will be sent to your GYN Dr who will review them with you - Labs from 5/12 & 5/10- cbc, cmp, inr - have been reviewed  - the pt expressed that she is open to exploring treatment options should bx be positive for malignancy - will need to hold prophylactic ASA 5 days pre op to aid in mitigating bleeding risk  - I will be in touch with [employer] coordinator Kim Multani NP   # CHF - recent exacerbation during admission from 5/1 - 5/10 - denies cardiac symptomatology since discharge - managed by [facility] Candelario cardiac  - pt unaware of name - on ASA regimen - will discuss feasibility of holding ASA  5 days pre op to aid in mitigating bleeding risk with cardiac Dr - 5/10/24 TTE reviewed - will require cardiac clearance - [facility] office to obtain it for review  # COPD, PNA - PNA diagnosed during admission from 5/1 - 5/10 - on IV abx via RUE PICC until 6/17 per ID - now on O2 via NC - instructed to bring inhaler(s) dos - unaware of names - will bring list to PST - airway evaluation & management as per PST / Anesthesia - will require pulmonary clearance - [facility] office to obtain it for review  # SUZIE - on nocturnal CPAP regimen - airway eval & mgmt as per PST/Anesthesia team  # Perforated Gallbladder, Choledocholithiasis - on imaging while inhouse found to have cholelithiasis with discontinuity of the inflamed gallbladder wall contiguous with a 4.3 cm multiloculated collection in the adjacent liver, most compatible with perforated cholecystitis with intrahepatic abscess   - underwent a percutaneous cholecystostomy with IR on 5/3. Bile culture + for ESBL E coli & enterococcus faecium - ID was consulted, recommended to continue ertapenem and ampicillin for a 6 week course (until 6/17) - MRCP also demonstrated choledocholithiasis resulting in biliary ductal dilatation - GI was consulted - recommended outpatient ERCP - pt to f/u as outpatient per discharge instructions - denies fever, chills, abd pain  - states all is well with dimple tube - denies flushing issues or site leakage, s/s infection - + bilious output - IR f/u for dimple tube check as per discharge instructions or as clinically indicated   Ms. KEYES's & son's comprehension was confirmed & all questions were asked and answered to their satisfaction.  The pt would like to move forward with the proposed biopsy.   IR contact information was provided to the pt should there be additional questions or concerns.  The pt was informed that our booking office will contact her to schedule PSTs & biopsy procedure.  Above case & plan discussed with Dr Madden.

## 2024-05-16 ENCOUNTER — NON-APPOINTMENT (OUTPATIENT)
Age: 84
End: 2024-05-16

## 2024-05-17 ENCOUNTER — NON-APPOINTMENT (OUTPATIENT)
Age: 84
End: 2024-05-17

## 2024-05-17 PROBLEM — R19.00 PELVIC MASS: Status: ACTIVE | Noted: 2024-01-01

## 2024-05-21 ENCOUNTER — APPOINTMENT (OUTPATIENT)
Dept: GYNECOLOGIC ONCOLOGY | Facility: CLINIC | Age: 84
End: 2024-05-21
Payer: MEDICARE

## 2024-05-21 VITALS
HEART RATE: 85 BPM | RESPIRATION RATE: 15 BRPM | TEMPERATURE: 97.6 F | HEIGHT: 58 IN | DIASTOLIC BLOOD PRESSURE: 78 MMHG | SYSTOLIC BLOOD PRESSURE: 123 MMHG | OXYGEN SATURATION: 85 %

## 2024-05-21 DIAGNOSIS — Z80.3 FAMILY HISTORY OF MALIGNANT NEOPLASM OF BREAST: ICD-10-CM

## 2024-05-21 DIAGNOSIS — E78.00 PURE HYPERCHOLESTEROLEMIA, UNSPECIFIED: ICD-10-CM

## 2024-05-21 DIAGNOSIS — R19.00 INTRA-ABDOMINAL AND PELVIC SWELLING, MASS AND LUMP, UNSPECIFIED SITE: ICD-10-CM

## 2024-05-21 PROCEDURE — 99205 OFFICE O/P NEW HI 60 MIN: CPT

## 2024-05-21 NOTE — HISTORY OF PRESENT ILLNESS
[FreeTextEntry1] : Referring GYN - Dr. Dana Christensen GI - Dr. Jose Maria Cotter Pulm - Dr. Brayan Mckeon IR - Dr. Voila Wang Cards - Dr. Karl Padron  Ms. Whittaker is an 82 yo  postmenopausal female who presents for initial consultation for the management of presumed GYN malignancy. She was admitted to [facility] form 5/1-5/10/24 for AHRF 2/2 URI vs. COPD exacerbation f/w sepsis 2/2 perforated gallbladder and 5.3 cm left ovarian mass with multiple omental and peritoneal deposits. She is currently on IV abx via RUE PICC for presumed PNA, s/p cholecystostomy tube with IR on 5/3/24. Bile culture + for ESBL E coli and few enterococcus faecium. ID was consulted, recommended to continue Ertapenam and ampicillin for a 6 week course (until 6/17/24). She presents for further management.   She has been a resident of [facility] for the past year and is currently in the rehab. PT to start this week. Currently bed bound. Needs help with all ADLs. Unable to use bedpan or commode.   LABS on 5/3/24: CA-125 was 801 (ref<38)  was 110 (ref<35) CEA was 4.2 (ref<3.8)   IMAGING: MRI A/P on 5/1/24: A 5.3 cm LEFT ovarian/adnexal mass with multiple omental and peritoneal deposits throughout the abdomen and pelvis, highly suspicious for primary gynecologic malignancy with peritoneal and omental carcinomatosis. Recommend gynecologic/oncologic consult and consider tissue sampling for confirmation. Cholelithiasis with discontinuity of the inflamed gallbladder wall contiguous with a 4.3 cm multiloculated collection in the adjacent liver in segment 5, most compatible with perforated cholecystitis with intrahepatic abscess. Given additional MR findings highly suspicious for malignancy, an underlying malignant/metastatic liver lesion cannot be entirely excluded. Multiple serosal implants/tumor deposits along the surface of the liver. Choledocholithiasis resulting in biliary ductal dilatation. Endometrium is distended to 1.4 cm with non-enhancing T1 hyperintense material, favoring hemorrhagic debris versus less likely underlying lesion.  CT CAP on 5/1/24: CHEST: LUNGS AND LARGE AIRWAYS: Patent central airways. No pneumonia or edema. Dependent atelectatic changes most prominent posterior aspect on the left. PLEURA: Trace right and small left-sided pleural effusion. VESSELS: No pulmonary embolus. No thoracic aortic aneurysm. Atherosclerosis including of the coronary arteries. HEART: Mild cardiomegaly. No pericardial effusion. Mitral annular calcifications. MEDIASTINUM AND BRANDEN: No lymphadenopathy. CHEST WALL AND LOWER NECK: Unremarkable. ABDOMEN AND PELVIS: GALLBLADDER: Several gallstones in the gallbladder including extending toward the neck. Concentric edematous gallbladder wall thickening. Apparent small focal discontinuity of the fundal wall the gallbladder. The gallbladder contents appear to extend through this defect in the gallbladder fundal wall, communicating with a small amount of pericholecystic fluid, and with a 2.7 x 3.6 x 3.2 cm TRV X AP X CC irregular in contour complex low-density mass in the overlying right lobe of liver. LIVER: 1.4 cm not well-defined hypodensity in the caudate lobe. Better defined 1 cm small cystic lesion more anteriorly in the right lobe. Both visible on image 34 of series 304. A few other scattered tiny hypodensities. BILE DUCTS: Normal caliber. SPLEEN: Within normal limits. PANCREAS: Within normal limits. ADRENALS: Within normal limits. KIDNEYS/URETERS: No hydronephrosis or renal stone. BLADDER: Within normal limits. REPRODUCTIVE ORGANS: 4.6 cm in greatest dimension complex cystic mass expands the left ovary. The right ovary is not identified separate from numerous soft tissue density and smaller calcific densities in the right adnexa with surrounding ascitic fluid. Endometrium thickened for a postmenopausal patient, 1.5 cm AP. BOWEL: No bowel obstruction. Appendix not identified, no evidence of appendicitis. PERITONEUM: Moderate volume ascites. Soft tissue densities within the greater omentum. Mixed attenuating right paracolic densities with associated retroperitoneal fat . Suspect additional nodularity along the right posterior peritoneal lining, as well as similar lesions in the pelvic, difficult to separate from bowel loops. VESSELS: Mild atherosclerosis. No abdominal aortic aneurysm. RETROPERITONEUM/LYMPH NODES: Mild bilateral external iliac lymphadenopathy. ABDOMINAL WALL: Small fat-containing umbilical hernia. Soft tissue edema involving the left anterolateral abdominal wall without focal collection. BONES: Degenerative changes. No lytic or blastic lesions. Left shoulder total arthroplasty. Chronic healed fracture of the sternal body.   PMHx: COPD on O2 via NC, hypothyroidism, SUZIE on CPAP, morbid obesity (BMI 50), HTN, CHF, psoriatic arthritis PSHx: C/S Fam Hx: Paunt (breast cancer 30s), Paunt (pancreatic cancer)   HCM: Mammogram: 2015 Colonoscopy: never COVID-19 vaccine series completed

## 2024-05-21 NOTE — PHYSICAL EXAM
[Chaperone Present] : A chaperone was present in the examining room during all aspects of the physical examination [Absent] : CVAT: absent [Normal] : Recto-Vaginal Exam: Normal [Fully active, able to carry on all pre-disease performance without restriction] : Status 0 - Fully active, able to carry on all pre-disease performance without restriction [Completely disabled. Cannot carry on any self care. Totally confined to bed or chair] : Status 4- Completely disabled. Cannot carry on any self care. Totally confined to bed or chair [Abnormal] : General appearance: Abnormal [FreeTextEntry1] : ill appearing [de-identified] : distended, no fluid wave, RUQ drain, nontender, [de-identified] : immobile and unable to transfer to exam table, unable to perform comprehensive exam

## 2024-05-21 NOTE — REVIEW OF SYSTEMS
[Negative] : Musculoskeletal [Hypothyroidism] : hypothyroidism [de-identified] : HLD, CHF [de-identified] : COPD, SUZIE on CPAP

## 2024-05-21 NOTE — DISCUSSION/SUMMARY
[Reviewed Clinical Lab Test(s)] : Results of clinical tests were reviewed. [Reviewed Radiology Report(s)] : Radiology reports were reviewed. [Reviewed Radiology Film/Image(s)] : Images from radiology studies were reviewed and examined. [Discuss Alternatives/Risks/Benefits w/Patient] : All alternatives, risks, and benefits were discussed with the patient/family and all questions were answered.  Patient expressed good understanding and appreciates the importance of follow up as recommended. [FreeTextEntry1] : I sat down with Rosalia and her son and reviewed the available diagnostic information. She presents after a recent hospital admission for sepsis due to perforated gall bladder and antibiotic resistant E. Coli. Currently a resident at [facility]. ECOG PFS 4, unable to get out of bed even with max help.  Imaging demonstrates a complex pelvic mass, ascites and bulky peritoneal carcinomatosis. A  is elevated. In constellation these findings are highly suggestive of an advanced ovarian cancer with peritoneal metastasis. We discussed the differential diagnosis in detail including benign gynecologic disease as well as non-gynecologic malignancy.   We discussed the management of advanced ovarian cancer in detail including 1) primary surgical debulking followed by systemic chemotherapy, 2) neoadjuvant chemotherapy followed by interval debulking surgery and 3) supportive/palliative care. The risks and benefits of each approach were discussed at length and in detail. We discussed the goal of surgery for ovarian cancer being optimal tumor cytoreduction down to minimal residual disease and the survival benefits associated with optimal debulking surgery. Surgery typically consists of hysterectomy, bilateral salpingoopherectomy, omentectomy, retroperitoneal lymph node dissection and may include more radical procedures such a bowel resection, diaphragm stripping or splenectomy. We discussed the risks of surgery including but not limited to risks of bleeding, transfusion, venous thromboembolism, infection, poor wound healing, intraoperative injury or anastomotic leak. I described the standard options for chemotherapy before and after surgery. I discussed the outcomes and toxicities expected with systemic chemotherapy.   I reviewed Rosalia's extensive medical history, imaging and paperwork from rehab and her recent hospitalization. She is clearly not a candidate for radical surgery. She is not a candidate for chemotherapy at this time due to her poor PFS and active infection. We discussed palliative and hospice care in detail. We discussed the genetics of OVCA and the potential benefits of obtaining a tissue confirmed diagnosis including ability to provide accurate prognostic information and potential genetic testing that may be informative for Rosalia's family.    Rosalia and her son expressed understanding of the information discussed during today's visit. They will discuss with the rest of the family. She is leaning towards proceeding with the IR biopsy.

## 2024-05-21 NOTE — ASSESSMENT
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 Coli infection due to perforated gall bladder, and poor PFS.

## 2024-05-21 NOTE — OB HISTORY
[Total Preg ___] : : [unfilled] [Living ___] : [unfilled] (living) [Vaginal ___] : [unfilled] vaginal delivery(s) [ ___] : [unfilled]  section delivery(s) [Approximately ___ (Month)] : the LMP was approximately [unfilled] month(s) ago [Menarche Age ____] : age at menarche was [unfilled] [Menopause  Age ____] : menopause occurred at age [unfilled]

## 2024-05-30 ENCOUNTER — OUTPATIENT (OUTPATIENT)
Dept: OUTPATIENT SERVICES | Facility: HOSPITAL | Age: 84
LOS: 1 days | End: 2024-05-30
Payer: MEDICARE

## 2024-05-30 VITALS
OXYGEN SATURATION: 100 % | HEART RATE: 73 BPM | SYSTOLIC BLOOD PRESSURE: 114 MMHG | DIASTOLIC BLOOD PRESSURE: 56 MMHG | TEMPERATURE: 98 F | RESPIRATION RATE: 17 BRPM

## 2024-05-30 VITALS
RESPIRATION RATE: 18 BRPM | DIASTOLIC BLOOD PRESSURE: 43 MMHG | OXYGEN SATURATION: 97 % | HEART RATE: 72 BPM | SYSTOLIC BLOOD PRESSURE: 122 MMHG

## 2024-05-30 DIAGNOSIS — Z96.612 PRESENCE OF LEFT ARTIFICIAL SHOULDER JOINT: Chronic | ICD-10-CM

## 2024-05-30 DIAGNOSIS — Z98.890 OTHER SPECIFIED POSTPROCEDURAL STATES: Chronic | ICD-10-CM

## 2024-05-30 DIAGNOSIS — Z98.891 HISTORY OF UTERINE SCAR FROM PREVIOUS SURGERY: Chronic | ICD-10-CM

## 2024-05-30 DIAGNOSIS — C56.2 MALIGNANT NEOPLASM OF LEFT OVARY: ICD-10-CM

## 2024-05-30 PROCEDURE — 49083 ABD PARACENTESIS W/IMAGING: CPT

## 2024-05-30 RX ORDER — BACLOFEN 100 %
1 POWDER (GRAM) MISCELLANEOUS
Qty: 0 | Refills: 0 | DISCHARGE

## 2024-06-13 ENCOUNTER — OUTPATIENT (OUTPATIENT)
Dept: OUTPATIENT SERVICES | Facility: HOSPITAL | Age: 84
LOS: 1 days | End: 2024-06-13
Payer: MEDICARE

## 2024-06-13 ENCOUNTER — EMERGENCY (EMERGENCY)
Facility: HOSPITAL | Age: 84
LOS: 1 days | End: 2024-06-13
Payer: SELF-PAY

## 2024-06-13 VITALS
SYSTOLIC BLOOD PRESSURE: 94 MMHG | RESPIRATION RATE: 20 BRPM | DIASTOLIC BLOOD PRESSURE: 73 MMHG | WEIGHT: 279.99 LBS | HEART RATE: 71 BPM | TEMPERATURE: 98 F | HEIGHT: 59 IN | OXYGEN SATURATION: 96 %

## 2024-06-13 VITALS
HEIGHT: 58 IN | SYSTOLIC BLOOD PRESSURE: 100 MMHG | TEMPERATURE: 98 F | OXYGEN SATURATION: 100 % | RESPIRATION RATE: 18 BRPM | DIASTOLIC BLOOD PRESSURE: 42 MMHG | WEIGHT: 279.99 LBS | HEART RATE: 74 BPM

## 2024-06-13 DIAGNOSIS — Z96.612 PRESENCE OF LEFT ARTIFICIAL SHOULDER JOINT: Chronic | ICD-10-CM

## 2024-06-13 DIAGNOSIS — R93.5 ABNORMAL FINDINGS ON DIAGNOSTIC IMAGING OF OTHER ABDOMINAL REGIONS, INCLUDING RETROPERITONEUM: ICD-10-CM

## 2024-06-13 DIAGNOSIS — Z98.890 OTHER SPECIFIED POSTPROCEDURAL STATES: Chronic | ICD-10-CM

## 2024-06-13 DIAGNOSIS — Z98.891 HISTORY OF UTERINE SCAR FROM PREVIOUS SURGERY: Chronic | ICD-10-CM

## 2024-06-13 DIAGNOSIS — K82.A2 PERFORATION OF GALLBLADDER IN CHOLECYSTITIS: ICD-10-CM

## 2024-06-13 DIAGNOSIS — K80.50 CALCULUS OF BILE DUCT WITHOUT CHOLANGITIS OR CHOLECYSTITIS WITHOUT OBSTRUCTION: ICD-10-CM

## 2024-06-13 PROCEDURE — 47536 EXCHANGE BILIARY DRG CATH: CPT | Mod: 53

## 2024-06-13 PROCEDURE — L9981: CPT

## 2024-06-13 PROCEDURE — C1769: CPT

## 2024-06-13 PROCEDURE — C1887: CPT

## 2024-06-13 RX ORDER — MONTELUKAST 4 MG/1
1 TABLET, CHEWABLE ORAL
Qty: 0 | Refills: 0 | DISCHARGE

## 2024-06-13 RX ORDER — LEVOTHYROXINE SODIUM 125 MCG
1 TABLET ORAL
Refills: 0 | DISCHARGE

## 2024-06-13 RX ORDER — ALENDRONATE SODIUM 70 MG/1
1 TABLET ORAL
Qty: 0 | Refills: 0 | DISCHARGE

## 2024-06-13 RX ORDER — FAMOTIDINE 10 MG/ML
1 INJECTION INTRAVENOUS
Refills: 0 | DISCHARGE

## 2024-06-13 RX ORDER — IPRATROPIUM/ALBUTEROL SULFATE 18-103MCG
3 AEROSOL WITH ADAPTER (GRAM) INHALATION
Refills: 0 | DISCHARGE

## 2024-06-13 RX ORDER — ASPIRIN/CALCIUM CARB/MAGNESIUM 324 MG
1 TABLET ORAL
Qty: 0 | Refills: 0 | DISCHARGE

## 2024-06-13 RX ORDER — SOD,AMMONIUM,POTASSIUM LACTATE
1 CREAM (GRAM) TOPICAL
Refills: 0 | DISCHARGE

## 2024-06-13 RX ORDER — NYSTATIN CREAM 100000 [USP'U]/G
1 CREAM TOPICAL
Refills: 0 | DISCHARGE

## 2024-06-13 RX ORDER — GABAPENTIN 400 MG/1
1 CAPSULE ORAL
Refills: 0 | DISCHARGE

## 2024-06-13 RX ORDER — FLUTICASONE PROPIONATE AND SALMETEROL 50; 250 UG/1; UG/1
1 POWDER ORAL; RESPIRATORY (INHALATION)
Qty: 0 | Refills: 0 | DISCHARGE

## 2024-06-13 RX ORDER — FLUTICASONE PROPIONATE 50 MCG
2 SPRAY, SUSPENSION NASAL
Qty: 0 | Refills: 0 | DISCHARGE

## 2024-06-13 RX ORDER — METOPROLOL TARTRATE 50 MG
0.5 TABLET ORAL
Refills: 0 | DISCHARGE

## 2024-06-13 RX ORDER — SULFASALAZINE 500 MG
2 TABLET ORAL
Refills: 0 | DISCHARGE

## 2024-06-13 RX ORDER — SODIUM CHLORIDE 0.65 %
1 AEROSOL, SPRAY (ML) NASAL
Refills: 0 | DISCHARGE

## 2024-06-13 RX ORDER — FUROSEMIDE 40 MG
1 TABLET ORAL
Refills: 0 | DISCHARGE

## 2024-06-13 RX ORDER — DICLOFENAC SODIUM 30 MG/G
2 GEL TOPICAL
Refills: 0 | DISCHARGE

## 2024-06-13 RX ORDER — FEXOFENADINE HCL 30 MG
1 TABLET ORAL
Refills: 0 | DISCHARGE

## 2024-06-13 RX ORDER — OXYBUTYNIN CHLORIDE 5 MG
1 TABLET ORAL
Refills: 0 | DISCHARGE

## 2024-06-13 NOTE — ASU PREOP CHECKLIST - ADVANCE DIRECTIVE ADDRESSED/READDRESSED
[address] [facility] Patient Status:  Inpatient   Age/Gender 81year old male MRN [id number]   Location [address] [facility] Attending [facility], [address] [facility] Day # 1 PCP Charlie Estrada MD       Anesthesia Post-op Note    Procedure done

## 2024-06-13 NOTE — ED ADULT NURSE NOTE - NS ED NURSE ERROR FT
Pt bought to ED by [facility] but was supposed to go to IR, pt taken to IR

## 2024-06-13 NOTE — ASU PATIENT PROFILE, ADULT - FALL HARM RISK - HARM RISK INTERVENTIONS

## 2024-06-13 NOTE — H&P ADULT - HISTORY OF PRESENT ILLNESS
Interventional Radiology    HPI: 83 female history of COPD not on O2, obesity, HTN, CHF, BCC, hypothyroidism, psoriatic arthritis SUZIE BIBEMS for acute onset SOB. 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 Moderate volume ascites. Trace left pleural effusion. Patient is currently s/p percutaneous cholecystostomy tube placement on 5/3 in Interventional Radiology with Dr. Madden. Patient now presents to IR for complaints of increased drainage from cholecystostomy tube. Drain usually has output of 40-50ml/day now with ~1L output. Patient denies fevers, chills, nausea, vomiting, abdominal pain. Patient's family at bedside reports patient had paracentesis 2 weeks ago which drained 3L ascites. Will plan for cholecystostomy drain evaluation.     Review of Systems:   Constitutional: No fever, weight loss, or fatigue  Neurological: No headaches, memory loss, loss of strength, numbness, or tremors  Respiratory: No cough, wheezing, chills or hemoptysis; No shortness of breath  Cardiovascular: No chest pain, palpitations, dizziness, or leg swelling  Gastrointestinal: No abdominal or epigastric pain. No nausea, vomiting, or hematemesis; No diarrhea or constipation. No melena or hematochezia.  Skin: No itching, burning, rashes, or lesions   Musculoskeletal: No joint pain or swelling; No muscle, back, or extremity pain    Allergies: erythromycin (Rash)  latex (Rash)  phenobarbital (Rash)    Medications (Abx/Cardiac/Anticoagulation/Blood Products)      Data:  147.3, 149.9  127, 127  T(C): 36.6  HR: 74  BP: 100/42  RR: 18  SpO2: 100%    Physical Exam  General: No acute distress, nontoxic, A&Ox3  Chest: Non labored breathing  Abdomen: obese, Non-distended, non-tender  Drain c/d/i with large drainage     RADIOLOGY & ADDITIONAL TESTS:    Imaging Reviewed    Plan: 83y Female presents for cholecystostomy drain evaluation.   -Risks/Benefits/alternatives explained with the patient and/or healthcare proxy and witnessed informed consent obtained.

## 2024-06-13 NOTE — PROCEDURE NOTE - PROCEDURE FINDINGS AND DETAILS
Existing cholecystostomy tube retracted with sideholes outside of the gallbladder. Successful exchange and repositioning of the cholecystostomy tube with new 8.5F drain and pigtail formed within the gallbladder.

## 2024-06-24 PROBLEM — K66.9: Status: ACTIVE | Noted: 2024-01-01

## 2024-06-24 PROBLEM — R18.8 ASCITES OF LIVER: Status: ACTIVE | Noted: 2024-01-01

## 2024-06-24 PROBLEM — I50.9 CONGENITAL HEART FAILURE: Status: ACTIVE | Noted: 2024-01-01

## 2024-06-24 PROBLEM — C80.0 CARCINOMATOSIS: Status: ACTIVE | Noted: 2024-01-01

## 2024-06-24 PROBLEM — G47.33 OSA ON CPAP: Status: ACTIVE | Noted: 2024-05-14

## 2024-06-24 PROBLEM — J44.9 COPD (CHRONIC OBSTRUCTIVE PULMONARY DISEASE): Status: ACTIVE | Noted: 2024-01-01

## 2024-06-24 PROBLEM — N83.8 OVARIAN MASS: Status: ACTIVE | Noted: 2024-01-01

## 2024-06-24 PROBLEM — K66.8 OMENTAL MASS: Status: ACTIVE | Noted: 2024-01-01

## 2024-06-24 PROBLEM — K80.50 CHOLEDOCHOLITHIASIS: Status: ACTIVE | Noted: 2024-01-01

## 2024-06-24 PROBLEM — Z99.81 OXYGEN DEPENDENT: Status: ACTIVE | Noted: 2024-05-14

## 2024-06-25 ENCOUNTER — APPOINTMENT (OUTPATIENT)
Dept: INTERVENTIONAL RADIOLOGY/VASCULAR | Facility: CLINIC | Age: 84
End: 2024-06-25

## 2024-06-25 DIAGNOSIS — N83.8 OTHER NONINFLAMMATORY DISORDERS OF OVARY, FALLOPIAN TUBE AND BROAD LIGAMENT: ICD-10-CM

## 2024-06-25 DIAGNOSIS — C80.0 DISSEMINATED MALIGNANT NEOPLASM, UNSPECIFIED: ICD-10-CM

## 2024-06-25 DIAGNOSIS — I50.9 HEART FAILURE, UNSPECIFIED: ICD-10-CM

## 2024-06-25 DIAGNOSIS — K66.9 DISORDER OF PERITONEUM, UNSPECIFIED: ICD-10-CM

## 2024-06-25 DIAGNOSIS — J44.9 CHRONIC OBSTRUCTIVE PULMONARY DISEASE, UNSPECIFIED: ICD-10-CM

## 2024-06-25 DIAGNOSIS — K80.50 CALCULUS OF BILE DUCT W/OUT CHOLANGITIS OR CHOLECYSTITIS W/OUT OBSTRUCTION: ICD-10-CM

## 2024-06-25 DIAGNOSIS — Z51.5 ENCOUNTER FOR PALLIATIVE CARE: ICD-10-CM

## 2024-06-25 DIAGNOSIS — K66.8 OTHER SPECIFIED DISORDERS OF PERITONEUM: ICD-10-CM

## 2024-06-25 DIAGNOSIS — Z99.81 DEPENDENCE ON SUPPLEMENTAL OXYGEN: ICD-10-CM

## 2024-06-25 DIAGNOSIS — R18.8 OTHER ASCITES: ICD-10-CM

## 2024-06-25 DIAGNOSIS — G47.33 OBSTRUCTIVE SLEEP APNEA (ADULT) (PEDIATRIC): ICD-10-CM

## 2024-06-25 DIAGNOSIS — Z79.82 LONG TERM (CURRENT) USE OF ASPIRIN: ICD-10-CM

## 2024-06-25 PROCEDURE — XXXXX: CPT

## 2024-06-25 RX ORDER — METOPROLOL SUCCINATE 200 MG/1
TABLET, EXTENDED RELEASE ORAL
Refills: 0 | Status: ACTIVE | COMMUNITY

## 2024-06-25 RX ORDER — FLUTICASONE PROPION/SALMETEROL 500-50 MCG
BLISTER, WITH INHALATION DEVICE INHALATION
Refills: 0 | Status: ACTIVE | COMMUNITY

## 2024-06-25 RX ORDER — BACLOFEN 10 MG/1
10 TABLET ORAL 3 TIMES DAILY
Qty: 90 | Refills: 2 | Status: DISCONTINUED | COMMUNITY
Start: 2022-05-02 | End: 2024-06-25

## 2024-06-25 RX ORDER — FUROSEMIDE 20 MG/1
20 TABLET ORAL
Refills: 0 | Status: ACTIVE | COMMUNITY

## 2024-06-25 RX ORDER — ALENDRONATE SODIUM 70 MG/1
70 TABLET ORAL
Refills: 0 | Status: ACTIVE | COMMUNITY

## 2024-06-25 RX ORDER — ASPIRIN 81 MG
81 TABLET, DELAYED RELEASE (ENTERIC COATED) ORAL
Refills: 0 | Status: ACTIVE | COMMUNITY

## 2024-07-03 ENCOUNTER — APPOINTMENT (OUTPATIENT)
Dept: INFECTIOUS DISEASE | Facility: CLINIC | Age: 84
End: 2024-07-03

## 2024-07-05 ENCOUNTER — TRANSCRIPTION ENCOUNTER (OUTPATIENT)
Age: 84
End: 2024-07-05

## 2024-07-05 NOTE — H&P ADULT - NSHPLABSRESULTS_GEN_ALL_CORE
.  LABS:                         7.8    7.61  )-----------( 301      ( 05 Jul 2024 18:24 )             25.1     07-05    138  |  99  |  35<H>  ----------------------------<  88  3.9   |  29  |  0.42<L>    Ca    8.8      05 Jul 2024 18:24  Phos  3.5     07-05  Mg     1.9     07-05    TPro  5.5<L>  /  Alb  2.5<L>  /  TBili  0.4  /  DBili  x   /  AST  9<L>  /  ALT  6<L>  /  AlkPhos  100  07-05    PT/INR - ( 05 Jul 2024 18:24 )   PT: 13.8 sec;   INR: 1.26 ratio         PTT - ( 05 Jul 2024 18:24 )  PTT:28.0 sec  Urinalysis Basic - ( 05 Jul 2024 18:24 )    Color: x / Appearance: x / SG: x / pH: x  Gluc: 88 mg/dL / Ketone: x  / Bili: x / Urobili: x   Blood: x / Protein: x / Nitrite: x   Leuk Esterase: x / RBC: x / WBC x   Sq Epi: x / Non Sq Epi: x / Bacteria: x            RADIOLOGY, EKG & ADDITIONAL TESTS: Reviewed.

## 2024-07-05 NOTE — H&P ADULT - ATTENDING COMMENTS
Patient seen and examined.  Agree with resident note as above.  Patient with hx as noted including recent admit for perforated galbladder s/p perc dimple c/b dx of ovarian cancer due to apparent peritoneal mets found on CT.  Patient now s/p large volume para in past and presented to IR today for placement of a tunnelled peritoneal drain.  Reportedly procedure was uneventful and 5L ascites drained.  Post-procedure patient had hypoTN requiring Michael gtt.      On initial eval by MICU POCUS showed intact heart with small IVC and thought to have hypovolemia due to post-procedural volume shifts.  Patient given albumin, LR, midodrine but despite these remained hypoTN.  Also with hgb down 2g since last check ~10 days ago.  Will now admit to MICU for persistent shock post-procedure.  On arrival to MICU repeat POCUS shows IVC 1-1.5cm and still somewhat variable.  ddx continued hypovolemia from fluid shifts vs active blood loss vs sepsis.  Will trend CBC and resuscitate with blood if hgb dropping or albumin if stable.  Check CTA Abd to assess for active blood loss into the peritoneal space.  Infectious workup including BCx/UA/UCx/ascites cx/RVP.  SCD ppx while possibly bleeding.  Clarify GOC with patient and family.    Rest of plan as above and I have edited as appropriate. care provided 7/5/24    Patient seen and examined.  Agree with resident note as above.  Patient with hx as noted including recent admit for perforated galbladder s/p perc dimple c/b dx of ovarian cancer due to apparent peritoneal mets found on CT.  Patient now s/p large volume para in past and presented to IR today for placement of a tunnelled peritoneal drain.  Reportedly procedure was uneventful and 5L ascites drained.  Post-procedure patient had hypoTN requiring Michael gtt.      On initial eval by MICU POCUS showed intact heart with small IVC and thought to have hypovolemia due to post-procedural volume shifts.  Patient given albumin, LR, midodrine but despite these remained hypoTN.  Also with hgb down 2g since last check ~10 days ago.  Will now admit to MICU for persistent shock post-procedure.  On arrival to MICU repeat POCUS shows IVC 1-1.5cm and still somewhat variable.  ddx continued hypovolemia from fluid shifts vs active blood loss vs sepsis.  Will trend CBC and resuscitate with blood if hgb dropping or albumin if stable.  Check CTA Abd to assess for active blood loss into the peritoneal space.  Infectious workup including BCx/UA/UCx/ascites cx/RVP.  SCD ppx while possibly bleeding.  Clarify GOC with patient and family.    Rest of plan as above and I have edited as appropriate.

## 2024-07-05 NOTE — H&P ADULT - NSHPPHYSICALEXAM_GEN_ALL_CORE
PHYSICAL EXAM:  General: NAD  Neck: Not elevated JVP  Respiratory: CTAB  Cardiovascular: RRR, S1 and S2 normal. No MRG  Abdomen: Distended, soft  Extremities: 2+ edema b/l  Neurological: AOx3, maintaining attention PHYSICAL EXAM:  ICU Vital Signs Last 24 Hrs  T(C): 36.1 (05 Jul 2024 22:00), Max: 36.3 (05 Jul 2024 12:10)  T(F): 97 (05 Jul 2024 22:00), Max: 97.3 (05 Jul 2024 12:10)  HR: 73 (05 Jul 2024 23:45) (50 - 86)  BP: 108/66 (05 Jul 2024 23:45) (63/47 - 134/74)  BP(mean): 78 (05 Jul 2024 23:45) (39 - 99)  RR: 25 (05 Jul 2024 23:45) (12 - 33)  SpO2: 98% (05 Jul 2024 23:45) (83% - 100%)    O2 Parameters below as of 05 Jul 2024 21:30  Patient On (Oxygen Delivery Method): nasal cannula  O2 Flow (L/min): 2    General: NAD  Neck: Not elevated JVP  Respiratory: CTAB  Cardiovascular: RRR, S1 and S2 normal. No MRG  Abdomen: Distended, soft, dimple and perit drains in place  Extremities: 2+ edema b/l  Neurological: AOx3, maintaining attention

## 2024-07-05 NOTE — ASU DISCHARGE PLAN (ADULT/PEDIATRIC) - ASU DC SPECIAL INSTRUCTIONSFT
PLEURX CATHETER PLACEMENT DISCHARGE INSTRUCTIONS     DIET  Unless otherwise instructions, return to usual diet and medications.      ACTIVITY  Avoid excessive lifting, bending or exercising for 24 hours.  Avoid any activity that causes a pulling sensation, pain, or kinking of the catheter.  REST today with activity as tolerated, resume normal activities tomorrow.  If you received sedation, it may take up to 24 hours to wear off. For the next 24 hours:  DO NOT DRIVE, OPERATE HEAVY MACHINERY OR USE POWER TOOLS  DO NOT DRINK ALCOHOL INCLUDING BEER  DO NOT TAKE ANY SLEEPING PILLS OR DEPRESSANT DRUGS  DO NOT TAKE CARE OF ANY LEGAL BUSINESS OR SIGN ANY LEGAL DOCUMENTS      SPECIAL INSTRUCTIONS  NO BATHS, NO HOT TUBS, NO SWIMMING OR SUBMERGING OF CATHETER SITE.  Dressing changes are a sterile procedure and trained caregivers should help change the dressing catheter.  You may take Tylenol for mild discomfort at the catheter site.  Contact your pulmonologist for instructions on how often you should drain the catheter. You can contact us at [phone] with any questions.   NEVER drain more than 1,000 ML FROM YOUR CHEST at one time unless instructed by your doctor.  You may shower after 24 hours with bandage in place. NEVER get the catheter site wet.  Always keep an extra PleurX drainage system at home as an emergency back-up.      WHAT TO EXPECT  Soreness, redness or pain at the procedure site for 7-10 days.      CALL YOUR DOCTOR IF:  Immediately if you develop chills, severe pain, or a temperature of greater than 101 degrees F.  Site becomes red, swollen, warm to touch, or pus develops.  Pain at site that is not relieved by Tylenol or prescribed pain medication.      SEEK CARE IMMEDIATELY OR CALL 911 IF:  You have severe chest pain.  Increased shortness of breath or trouble breathing.  The PleurX catheter becomes dislodged, broken, begins to leak, blood around the catheter, pulled out or cut.  Begin to cough up blood.  Trouble speaking or increased confusion.  Fluid drainage that is cloudy, bloody or has a foul odor.  If you have any questions or concerns after you arrive at home, please call your doctor. Or you may contact us at [phone] from 7am-5pm Monday through Friday and ask to speak to your doctor. During afterhours and weekends call the same number and ask to speak to your provider. You will be directed to an answering service and your call will be answered through the day.

## 2024-07-05 NOTE — ASU PATIENT PROFILE, ADULT - NSICDXPASTMEDICALHX_GEN_ALL_CORE_FT
PAST MEDICAL HISTORY:  Basal cell carcinoma LLE s/p Moh's excision    Chronic bronchitis     Chronic diastolic congestive heart failure     Chronic obstructive pulmonary disease (COPD)     HLD (hyperlipidemia)     Hypertension     Hypothyroid     Morbid obesity     SUZIE (obstructive sleep apnea)     Osteoarthritis     Overactive bladder     Psoriatic arthritis     PVD (peripheral vascular disease)     Spinal stenosis     Squamous cell carcinoma LLE s/p Moh's excision

## 2024-07-05 NOTE — PROGRESS NOTE ADULT - ASSESSMENT
IR Progress Note  Technically successful abdominal pleurx catheter placement with removal of 5L of fluid. In recovery, patient noted to be hypotensive. Given fluid bolus, albumin repletion, and started on 0.5 of phenylephrine. MICU consulted, deemed not a candidate at this time. Patient being transferred to PACU for titration of pressors. Hospitalist team notified (Dr. Toney) for admission pending pressor requirements.

## 2024-07-05 NOTE — CONSULT NOTE ADULT - SUBJECTIVE AND OBJECTIVE BOX
CHIEF COMPLAINT:    HPI: 83 year old woman with COPD not on O2, obesity, hypertension, HF, BCC, hypothyroidism, psoriatic arthritis, SUZIE, metastatic ovarian cancer and perforated gallbladder who presenting for abdominal pleurX placement. Patient had 5 L of fluid drained from her abdomen.     IR Course: Given 50 cc 25% albumin, 500cc fluid and started on 0.5 of phenylephrine.     PAST MEDICAL & SURGICAL HISTORY:  Hypothyroid  Chronic bronchitis  Psoriatic arthritis  Osteoarthritis  Overactive bladder  Basal cell carcinoma  LLE s/p Moh's excision  Squamous cell carcinoma  LLE s/p Moh's excision  Hypertension  HLD (hyperlipidemia)  Chronic diastolic congestive heart failure  PVD (peripheral vascular disease)  Spinal stenosis  Chronic obstructive pulmonary disease (COPD)  SUZIE (obstructive sleep apnea)  Morbid obesity  History of carpal tunnel release  L wrist  H/O total shoulder replacement, left  H/O:           FAMILY HISTORY:  Family history of CHF (congestive heart failure) (Father)  Father,  49y/o    Family history of hypertension in mother (Mother)        SOCIAL HISTORY:  See H&P    Allergies    latex (Rash)  phenobarbital (Rash)  erythromycin (Rash)    Intolerances    Milk (Rash)      HOME MEDICATIONS:    REVIEW OF SYSTEMS:  Constitutional: [x ] negative [ ] fevers [ ] chills [ ] weight loss [ ] weight gain  HEENT: [x ] negative [ ] dry eyes [ ] eye irritation [ ] postnasal drip [ ] nasal congestion  CV: [x ] negative  [ ] chest pain [ ] orthopnea [ ] palpitations [ ] murmur  Resp: [x ] negative [ ] cough [ ] shortness of breath [ ] dyspnea [ ] wheezing [ ] sputum [ ] hemoptysis  GI: [x ] negative [ ] nausea [ ] vomiting [ ] diarrhea [ ] constipation [ ] abd pain [ ] dysphagia   : [ x] negative [ ] dysuria [ ] nocturia [ ] hematuria [ ] increased urinary frequency  Musculoskeletal: [x ] negative [ ] back pain [ ] myalgias [ ] arthralgias [ ] fracture  Skin: [x ] negative [ ] rash [ ] itch  Neurological: [x ] negative [ ] headache [ ] dizziness [ ] syncope [ ] weakness [ ] numbness  Psychiatric: [x ] negative [ ] anxiety [ ] depression  Endocrine: [x ] negative [ ] diabetes [ ] thyroid problem  Hematologic/Lymphatic: [x ] negative [ ] anemia [ ] bleeding problem  Allergic/Immunologic: [ x] negative [ ] itchy eyes [ ] nasal discharge [ ] hives [ ] angioedema  [ ] All other systems negative  [ ] Unable to assess ROS because ________    OBJECTIVE:  ICU Vital Signs Last 24 Hrs  T(C): 36.2 (2024 14:15), Max: 36.3 (2024 12:10)  T(F): 97.2 (2024 14:15), Max: 97.3 (2024 12:10)  HR: 77 (2024 16:15) (50 - 86)  BP: 91/69 (2024 16:15) (73/62 - 115/79)  BP(mean): 78 (2024 16:15) (39 - 78)  ABP: --  ABP(mean): --  RR: 22 (2024 16:15) (12 - 27)  SpO2: 100% (2024 16:15) (93% - 100%)    O2 Parameters below as of 2024 14:45  Patient On (Oxygen Delivery Method): nasal cannula  O2 Flow (L/min): 2            CAPILLARY BLOOD GLUCOSE          PHYSICAL EXAM:  General: NAD  Neck: Not elevated JVP  Respiratory: CTAB  Cardiovascular: RRR, S1 and S2 normal. No MRG  Abdomen: Distended, soft  Extremities: 2+ edema b/l  Neurological: AOx3, maintaining attention    LINES:     HOSPITAL MEDICATIONS:  Standing Meds:  midodrine 15 milliGRAM(s) Oral once  phenylephrine    Infusion 0.5 MICROgram(s)/kG/Min IV Continuous <Continuous>      PRN Meds:      LABS:    Hgb Trend:         Creatinine Trend:         Venous Blood Gas:   @ 16:17  7.06/79/38/22/50.6  VBG Lactate: 0.8      MICROBIOLOGY:     RADIOLOGY:  [ ] Reviewed and interpreted by me    EKG:

## 2024-07-05 NOTE — PRE PROCEDURE NOTE - PRE PROCEDURE EVALUATION
Interventional Radiology    HPI: 84 y.o female from [facility] with PMHx of obesity (bmi: 41.8) HTN, HLD, chronic diastolic CHF, PVD, hypothyroidism, SUZIE (nightly CPAP), asthma/COPD (on 2L NC), GERD, OAB, arthritis with metastatic ovarian cancer pending abdominal pleurx catheter.      Allergies: latex (Rash)  phenobarbital (Rash)  erythromycin (Rash)    Medications (Abx/Cardiac/Anticoagulation/Blood Products)      Data:    Exam  General: No acute distress  Chest: Non labored breathing    Imaging: Reviewed    Plan: 83y Female presents for abdominal pleurx catheter.  -Risks/Benefits/alternatives explained with the patient and/or healthcare proxy and witnessed informed consent obtained.

## 2024-07-05 NOTE — CHART NOTE - NSCHARTNOTEFT_GEN_A_CORE
: Cherelle BUCKLEY NP     INDICATION: Shock    PROCEDURE:  [x ] LIMITED ECHO  [x ] LIMITED CHEST  [ ] LIMITED RETROPERITONEAL  [x ] LIMITED ABDOMINAL  [ ] LIMITED DVT  [ ] NEEDLE GUIDANCE VASCULAR  [ ] NEEDLE GUIDANCE THORACENTESIS  [ ] NEEDLE GUIDANCE PARACENTESIS  [ ] NEEDLE GUIDANCE PERICARDIOCENTESIS  [ ] OTHER    FINDINGS:  RV smaller than LV   LV appears to have preserved systolic function   May have pericardial effusion vs fat pad no apparent hemodynamic compromise  IVC noted to be davi. 1- 1.5 cm with respiratory variation     Chest   Unable to visualize pleural Bases due to body habitus   Some scattered B lines to right anterior chest wall      INTERPRETATION:  Preserved cardiac size and function   IVC with respiratory variation and appears to be 1-1.5 cm

## 2024-07-05 NOTE — CONSULT NOTE ADULT - ASSESSMENT
83 year old woman with COPD not on O2, obesity, hypertension, HF, BCC, hypothyroidism, psoriatic arthritis, SUZIE, metastatic ovarian cancer and perforated gallbladder who presenting for hypotension after paracentesis    #Hypotension  #hypovolemic shock  Patient s/p 5L paracentesis  Bedside POCUS Showing collapsed IVC, hyperdynamic LV  - s/p 500 cc LR, 50cc 25% albumin  - Give 25% 100 cc albumin  - Give additional 500 CC LR  - midodrine 15 mg  - Repeat BP  - CBC/CMP/VBG  - VBG 7.06/79/38/22 lactate 0.8, noted, with glucose of 51 and iCal of 0.52, likely laboratory error / drawn off line.  - Hgb off VBG 6.6, if truly reduced, CT A&P with IV contrast.  - Please repeat VBG  - wean phenylephrine.     Andrea Bower MD  PGY-3

## 2024-07-05 NOTE — H&P ADULT - HISTORY OF PRESENT ILLNESS
HPI: 83 year old woman with COPD not on O2, obesity, hypertension, HF, BCC, hypothyroidism, psoriatic arthritis, SUZIE, metastatic ovarian cancer and perforated gallbladder who presenting for abdominal pleurX placement. Patient had 5 L of fluid drained from her abdomen.     IR Course: Given 50 cc 25% albumin, 500cc fluid and started on 0.5 of phenylephrine.     PACU Course: 500 cc IVF, 25% albumin 100 cc, midodrine 15, albumin 5% 250cc.  Michael @ 0.3

## 2024-07-05 NOTE — H&P ADULT - ASSESSMENT
83 year old woman with COPD not on O2, obesity, hypertension, HFpEF, BCC, hypothyroidism, psoriatic arthritis, SUZIE, metastatic ovarian cancer and perforated gallbladder who presenting for hypotension after paracentesis    Neuro  No active Issues    CV    #Shock  Hypovolemic 2/2 paracentesis vs anemia less likely septic  POCUS showing collapsed IVC  - hemoglobin decrease from 9.6 to 7.6  - CT Angio  - holding metoprolol tartrate    #History of HFpEF  ?CAD  - c/w ASA  - c/w pravastatin    Respiratory  #COPD  - Advair  On 4L NC,   7.40/56/133/35 98%     #Asthma  - singulkair    Renal  Cr. 0.42 actively diuresing    Endocrine    #Hypothyroidism  - synthroid    No active issues    GI  Hx of perforated gall bladder    Diet    ID  No indication abx    Hematology    #Anemia  Likely blood loss anemia vs dilutional        Metastatic Ovarian Cancer  - Pain controlled.     GOC    Previous DNR/DNI with trial of intubation         83 year old woman with COPD not on O2, obesity, hypertension, HFpEF, BCC, hypothyroidism, psoriatic arthritis, SUZIE, metastatic ovarian cancer and perforated gallbladder who presenting for hypotension after paracentesis.    Neuro  No active Issues    CV    #Shock  Hypovolemic 2/2 paracentesis vs anemia less likely septic  POCUS showing collapsed IVC  - hemoglobin decrease from 9.6 to 7.6  - CT Angio to assess for peritoneal bleed  - continue gentle colloid vs blood resuscitation  - holding metoprolol tartrate    #History of HFpEF  ?CAD  - c/w ASA  - c/w pravastatin    Respiratory  #COPD  - Advair  On 4L NC,   7.40/56/133/35 98%     #Asthma  - singulair    Renal  Cr. 0.42 at baseline, UOP nonoliguric    Endocrine    #Hypothyroidism  - synthroid    No active issues    GI  Hx of perforated gall bladder  -maintain dimple drain    Diet    ID  No indication abx    Hematology    #Anemia  Likely blood loss anemia vs dilutional    DVT ppx  -hold chemical ppx while possibly bleeding  -SCDs        Metastatic Ovarian Cancer  - Pain controlled.     GOC    Previous DNR/DNI with trial of intubation??  -will discuss GOC with patient and family

## 2024-07-05 NOTE — PROCEDURE NOTE - NSINFORMCONSENT_GEN_A_CORE
Benefits, risks, and possible complications of procedure explained to patient/caregiver who verbalized understanding and gave written consent. 2 = A lot of assistance

## 2024-07-05 NOTE — ASU PATIENT PROFILE, ADULT - FALL HARM RISK - HARM RISK INTERVENTIONS

## 2024-07-06 NOTE — PHYSICAL THERAPY INITIAL EVALUATION ADULT - DIAGNOSIS, PT EVAL
impaired balance, strength, endurance for functional mobility / appears to be at functional baseline

## 2024-07-06 NOTE — PHYSICAL THERAPY INITIAL EVALUATION ADULT - GENERAL OBSERVATIONS, REHAB EVAL
received semisupine in bed, A&OX4, following commands, willing to participate, son at bedside, admitted from rehab for abdominal pleurex catheter, transferred to MICU for hypotension, +supplemental 02 via NC, +ICU monitoring.

## 2024-07-06 NOTE — PHYSICAL THERAPY INITIAL EVALUATION ADULT - PERTINENT HX OF CURRENT PROBLEM, REHAB EVAL
Pt is 83F admitted 7/5/24 PMHx COPD not on O2, obesity, hypertension, HF, BCC, hypothyroidism, psoriatic arthritis, SUZIE, metastatic ovarian cancer and perforated gallbladder who presenting for abdominal pleurX placement. Patient had 5 L of fluid drained from her abdomen. IR Course: Given 50 cc 25% albumin, 500cc fluid and started on 0.5 of phenylephrine.       CT ABDDOMEN/PELVIS: sgnificant artifact from the patient abdominal wall in contact with the CT gantry.Within limitations, no hemoperitoneum or active bleed identified.Moderate bilateral pleural effusions.Interval development of right hydronephrosis.Redemonstrated left adnexal mass.Follow-up official report in the morning.

## 2024-07-06 NOTE — PHYSICAL THERAPY INITIAL EVALUATION ADULT - ADDITIONAL COMMENTS
Pt admitted from [facility], was in LTC - hospice. Pt was no longer receiving PT services, states that she was mobile with use of mechanical lift device.

## 2024-07-06 NOTE — OCCUPATIONAL THERAPY INITIAL EVALUATION ADULT - LIVES WITH, PROFILE
Pt admitted from LTC/SNF where she was not participating in therapy as she was on hospice.  pt states she required assist for all ADL/mobility, used a total assist lift to transfer OOBTC at times

## 2024-07-06 NOTE — OCCUPATIONAL THERAPY INITIAL EVALUATION ADULT - PERTINENT HX OF CURRENT PROBLEM, REHAB EVAL
83 year old woman with COPD not on O2, obesity, hypertension, HFpEF, BCC, hypothyroidism, psoriatic arthritis, SUZIE, metastatic ovarian cancer and perforated gallbladder who presenting for hypotension after paracentesis. Hemoglobin decrease from 9.6 to 7.6.   Preliminary results for CT abd/pelvis: Significant artifact from the patient abdominal wall in contact with the CT gantry. Within limitations, no hemoperitoneum or active bleed identified. Moderate bilateral pleural effusions. Interval development of right hydronephrosis. Redemonstrated left adnexal mass. Follow-up official report in the morning. 83 year old woman with COPD not on O2, obesity, hypertension, HFpEF, BCC, hypothyroidism, psoriatic arthritis, SUZIE, metastatic ovarian cancer and perforated gallbladder. Presenting for abdominal pleurX placement. Patient had 5 L of fluid drained from her abdomen 7/5.  C/b hypotension after paracentesis. Hemoglobin decrease from 9.6 to 7.6.   Preliminary results for CT abd/pelvis: Significant artifact from the patient abdominal wall in contact with the CT gantry. Within limitations, no hemoperitoneum or active bleed identified. Moderate bilateral pleural effusions. Interval development of right hydronephrosis. Redemonstrated left adnexal mass. Follow-up official report in the morning.

## 2024-07-06 NOTE — OCCUPATIONAL THERAPY INITIAL EVALUATION ADULT - DIAGNOSIS, OT EVAL
Pt presents with impaired ROM, strength, endurance, balance, all impacting ability to perform ADLs and functional mobility  however, pt appears at functional baseline

## 2024-07-06 NOTE — CHART NOTE - NSCHARTNOTEFT_GEN_A_CORE
MICU DOWN GRADE NOTE  ----------------------------------------    Transfer from: MICU   Transfer to: (X) Medicine         (   ) Telemetry         (   )  RCU     (   ) Palliative          (    ) Stroke Unit    ----------------------------------------    Patient is a 83y old  Female who presents with a chief complaint of Hypotension (2024 07:29)      HPI:    INTERVAL HPI/OVERNIGHT EVENTS:        REVIEW OF SYSTEMS:  CONSTITUTIONAL: No fever, chills  HEENT:  No blurry vision No sinus or throat pain  NECK: No pain or stiffness  RESPIRATORY: No cough, wheezing, chills or hemoptysis; No shortness of breath  CARDIOVASCULAR: No chest pain, palpitations  GASTROINTESTINAL: No abdominal pain. No nausea, vomiting, or diarrhea  GENITOURINARY: No dysuria  NEUROLOGICAL: No HA, No focal weakness  SKIN: No itching, burning, rashes, or lesions   MUSCULOSKELETAL: No joint pain or swelling; No muscle, back, or extremity pain    MEDICATIONS:  albuterol    90 MICROgram(s) HFA Inhaler 2 Puff(s) Inhalation every 6 hours PRN  aspirin enteric coated 81 milliGRAM(s) Oral daily  fluticasone propionate 50 MICROgram(s)/spray Nasal Spray 1 Spray(s) Both Nostrils two times a day  levothyroxine 50 MICROGram(s) Oral daily  montelukast 10 milliGRAM(s) Oral daily  nystatin Powder 1 Application(s) Topical two times a day  oxybutynin 10 milliGRAM(s) Oral every 24 hours  phenylephrine    Infusion 0.5 MICROgram(s)/kG/Min IV Continuous <Continuous>    T(C): 36.9 (24 @ 12:00), Max: 36.9 (24 @ 12:00)  HR: 80 (24 @ 15:00) (65 - 84)  BP: 134/62 (24 @ 15:00) (63/47 - 174/77)  RR: 19 (24 @ 15:00) (12 - 33)  SpO2: 97% (24 @ 15:00) (83% - 100%)  Wt(kg): --Vital Signs Last 24 Hrs  T(C): 36.9 (2024 12:00), Max: 36.9 (2024 12:00)  T(F): 98.4 (2024 12:00), Max: 98.4 (2024 12:00)  HR: 80 (2024 15:00) (65 - 84)  BP: 134/62 (2024 15:00) (63/47 - 174/77)  BP(mean): 89 (2024 15:00) (50 - 99)  RR: 19 (2024 15:00) (12 - 33)  SpO2: 97% (2024 15:00) (83% - 100%)    Parameters below as of 2024 10:33  Patient On (Oxygen Delivery Method): nasal cannula    PHYSICAL EXAM:  GENERAL: NAD, well-groomed, well-developed  HEAD:  Atraumatic, Normocephalic  EYES: EOMI, PERRLA, conjunctiva and sclera clear  ENMT:  Moist mucous membranes  NECK: Supple, No JVD,  CHEST/LUNG: Clear to auscultation bilaterally; No rales, rhonchi, wheezing, or rubs  HEART: Regular rate and rhythm; No murmurs, rubs, or gallops  ABDOMEN: Mary Carmen drain in place. Distended. Soft, Nontender; Bowel sounds present  NEURO: Alert & Oriented X3  EXTREMITIES: No LE edema, no calf tenderness  LYMPH: No lymphadenopathy noted  SKIN: No rashes or lesions    Consultant(s) Notes Reviewed:  [x ] YES  [ ] NO  Care Discussed with Consultants/Other Providers [ x] YES  [ ] NO    LABS:                        8.2    7.97  )-----------( 298      ( 2024 23:31 )             28.2     07-05    139  |  98  |  34<H>  ----------------------------<  85  3.7   |  31  |  0.43<L>    Ca    8.3<L>      2024 23:31  Phos  3.4     07-05  Mg     1.8     07-05    TPro  5.3<L>  /  Alb  2.7<L>  /  TBili  0.4  /  DBili  x   /  AST  12  /  ALT  7<L>  /  AlkPhos  93  07-05    PT/INR - ( 2024 23:31 )   PT: 13.3 sec;   INR: 1.22 ratio      PTT - ( 2024 18:24 )  PTT:28.0 sec  Urinalysis Basic - ( 2024 00:46 )    Color: Dark Yellow / Appearance: Clear / S.015 / pH: x  Gluc: x / Ketone: Negative mg/dL  / Bili: Negative / Urobili: 0.2 mg/dL   Blood: x / Protein: Negative mg/dL / Nitrite: Negative   Leuk Esterase: Large / RBC: 2 /HPF / WBC 5 /HPF   Sq Epi: x / Non Sq Epi: 7 /HPF / Bacteria: Few /HPF    CAPILLARY BLOOD GLUCOSE  POCT Blood Glucose.: 93 mg/dL (2024 16:58)    ABG - ( 2024 18:23 )  pH, Arterial: 7.40  pH, Blood: x     /  pCO2: 56    /  pO2: 133   / HCO3: 35    / Base Excess: 8.8   /  SaO2: 98.1      Urinalysis Basic - ( 2024 00:46 )    Color: Dark Yellow / Appearance: Clear / S.015 / pH: x  Gluc: x / Ketone: Negative mg/dL  / Bili: Negative / Urobili: 0.2 mg/dL   Blood: x / Protein: Negative mg/dL / Nitrite: Negative   Leuk Esterase: Large / RBC: 2 /HPF / WBC 5 /HPF   Sq Epi: x / Non Sq Epi: 7 /HPF / Bacteria: Few /HPF    RADIOLOGY & ADDITIONAL TESTS:    Imaging Personally Reviewed:  [x ] YES  [ ] NO MICU DOWN GRADE NOTE  ----------------------------------------    Transfer from: MICU   Transfer to: (X) Medicine         (   ) Telemetry         (   )  RCU     (   ) Palliative          (    ) Stroke Unit    ----------------------------------------    HPI: 83 year old woman with COPD not on O2, obesity, hypertension, HFpEF, BCC, hypothyroidism, psoriatic arthritis, SUZIE, metastatic ovarian cancer and perforated gallbladder who presenting for hypotension after paracentesis. Patient had 5 L of fluid drained from her abdomen after which she became hypotensive and required jasmin gtt.    INTERVAL HPI/OVERNIGHT EVENTS:  Following admission to the MICU, patient requiring progressively less pressor requirements. Home anti-HTNs were held due to concern for hypotension. Patient with CT imaging of abdomen showing no active bleed prior to being downgraded from unit; per IR does not require urgent intervention.    REVIEW OF SYSTEMS:  CONSTITUTIONAL: No fever, chills  HEENT:  No blurry vision No sinus or throat pain  NECK: No pain or stiffness  RESPIRATORY: No cough, wheezing, chills or hemoptysis; No shortness of breath  CARDIOVASCULAR: No chest pain, palpitations  GASTROINTESTINAL: No abdominal pain. No nausea, vomiting, or diarrhea  GENITOURINARY: No dysuria  NEUROLOGICAL: No HA, No focal weakness  SKIN: No itching, burning, rashes, or lesions   MUSCULOSKELETAL: No joint pain or swelling; No muscle, back, or extremity pain    MEDICATIONS:  albuterol    90 MICROgram(s) HFA Inhaler 2 Puff(s) Inhalation every 6 hours PRN  aspirin enteric coated 81 milliGRAM(s) Oral daily  fluticasone propionate 50 MICROgram(s)/spray Nasal Spray 1 Spray(s) Both Nostrils two times a day  levothyroxine 50 MICROGram(s) Oral daily  montelukast 10 milliGRAM(s) Oral daily  nystatin Powder 1 Application(s) Topical two times a day  oxybutynin 10 milliGRAM(s) Oral every 24 hours  phenylephrine    Infusion 0.5 MICROgram(s)/kG/Min IV Continuous <Continuous>    T(C): 36.9 (24 @ 12:00), Max: 36.9 (24 @ 12:00)  HR: 80 (24 @ 15:00) (65 - 84)  BP: 134/62 (24 @ 15:00) (63/47 - 174/77)  RR: 19 (24 @ 15:00) (12 - 33)  SpO2: 97% (24 @ 15:00) (83% - 100%)  Wt(kg): --Vital Signs Last 24 Hrs  T(C): 36.9 (2024 12:00), Max: 36.9 (2024 12:00)  T(F): 98.4 (2024 12:00), Max: 98.4 (2024 12:00)  HR: 80 (2024 15:00) (65 - 84)  BP: 134/62 (2024 15:00) (63/47 - 174/77)  BP(mean): 89 (2024 15:00) (50 - 99)  RR: 19 (2024 15:00) (12 - 33)  SpO2: 97% (2024 15:00) (83% - 100%)    Parameters below as of 2024 10:33  Patient On (Oxygen Delivery Method): nasal cannula    PHYSICAL EXAM:  GENERAL: NAD, well-groomed, well-developed  HEAD:  Atraumatic, Normocephalic  EYES: EOMI, PERRLA, conjunctiva and sclera clear  ENMT:  Moist mucous membranes  NECK: Supple, No JVD,  CHEST/LUNG: Clear to auscultation bilaterally; No rales, rhonchi, wheezing, or rubs  HEART: Regular rate and rhythm; No murmurs, rubs, or gallops  ABDOMEN: Mary Carmen drain in place. Distended. Soft, Nontender; Bowel sounds present  NEURO: Alert & Oriented X3  EXTREMITIES: No LE edema, no calf tenderness  LYMPH: No lymphadenopathy noted  SKIN: No rashes or lesions    Consultant(s) Notes Reviewed:  [x ] YES  [ ] NO  Care Discussed with Consultants/Other Providers [ x] YES  [ ] NO    LABS:                        8.2    7.97  )-----------( 298      ( 2024 23:31 )             28.2     07-05    139  |  98  |  34<H>  ----------------------------<  85  3.7   |  31  |  0.43<L>    Ca    8.3<L>      2024 23:31  Phos  3.4     07-05  Mg     1.8     07-05    TPro  5.3<L>  /  Alb  2.7<L>  /  TBili  0.4  /  DBili  x   /  AST  12  /  ALT  7<L>  /  AlkPhos  93  07-05    PT/INR - ( 2024 23:31 )   PT: 13.3 sec;   INR: 1.22 ratio      PTT - ( 2024 18:24 )  PTT:28.0 sec  Urinalysis Basic - ( 2024 00:46 )    Color: Dark Yellow / Appearance: Clear / S.015 / pH: x  Gluc: x / Ketone: Negative mg/dL  / Bili: Negative / Urobili: 0.2 mg/dL   Blood: x / Protein: Negative mg/dL / Nitrite: Negative   Leuk Esterase: Large / RBC: 2 /HPF / WBC 5 /HPF   Sq Epi: x / Non Sq Epi: 7 /HPF / Bacteria: Few /HPF    CAPILLARY BLOOD GLUCOSE  POCT Blood Glucose.: 93 mg/dL (2024 16:58)    ABG - ( 2024 18:23 )  pH, Arterial: 7.40  pH, Blood: x     /  pCO2: 56    /  pO2: 133   / HCO3: 35    / Base Excess: 8.8   /  SaO2: 98.1      Urinalysis Basic - ( 2024 00:46 )    Color: Dark Yellow / Appearance: Clear / S.015 / pH: x  Gluc: x / Ketone: Negative mg/dL  / Bili: Negative / Urobili: 0.2 mg/dL   Blood: x / Protein: Negative mg/dL / Nitrite: Negative   Leuk Esterase: Large / RBC: 2 /HPF / WBC 5 /HPF   Sq Epi: x / Non Sq Epi: 7 /HPF / Bacteria: Few /HPF    RADIOLOGY & ADDITIONAL TESTS:    Imaging Personally Reviewed:  [x ] YES  [ ] NO MICU DOWN GRADE NOTE  ----------------------------------------    Transfer from: MICU   Transfer to: (X) Medicine         (   ) Telemetry         (   )  RCU     (   ) Palliative          (    ) Stroke Unit    ----------------------------------------    HPI: 83 year old woman with COPD not on O2, obesity, hypertension, HFpEF, BCC, hypothyroidism, psoriatic arthritis, SUZIE, metastatic ovarian cancer and perforated gallbladder who presenting for hypotension after paracentesis. Patient had 5 L of fluid drained from her abdomen after which she became hypotensive and required jasmin gtt.    INTERVAL HPI/OVERNIGHT EVENTS:  Following admission to the MICU, patient requiring progressively less pressor requirements. Home anti-HTNs were held due to concern for hypotension. Patient with CT imaging of abdomen showing no active bleed prior to being downgraded from unit; per IR does not require urgent intervention.    To do:  [ ] Restart home lasix, metoprolol    REVIEW OF SYSTEMS:  CONSTITUTIONAL: No fever, chills  HEENT:  No blurry vision No sinus or throat pain  NECK: No pain or stiffness  RESPIRATORY: No cough, wheezing, chills or hemoptysis; No shortness of breath  CARDIOVASCULAR: No chest pain, palpitations  GASTROINTESTINAL: No abdominal pain. No nausea, vomiting, or diarrhea  GENITOURINARY: No dysuria  NEUROLOGICAL: No HA, No focal weakness  SKIN: No itching, burning, rashes, or lesions   MUSCULOSKELETAL: No joint pain or swelling; No muscle, back, or extremity pain    MEDICATIONS:  albuterol    90 MICROgram(s) HFA Inhaler 2 Puff(s) Inhalation every 6 hours PRN  aspirin enteric coated 81 milliGRAM(s) Oral daily  fluticasone propionate 50 MICROgram(s)/spray Nasal Spray 1 Spray(s) Both Nostrils two times a day  levothyroxine 50 MICROGram(s) Oral daily  montelukast 10 milliGRAM(s) Oral daily  nystatin Powder 1 Application(s) Topical two times a day  oxybutynin 10 milliGRAM(s) Oral every 24 hours  phenylephrine    Infusion 0.5 MICROgram(s)/kG/Min IV Continuous <Continuous>    T(C): 36.9 (24 @ 12:00), Max: 36.9 (24 @ 12:00)  HR: 80 (24 @ 15:00) (65 - 84)  BP: 134/62 (24 @ 15:00) (63/47 - 174/77)  RR: 19 (24 @ 15:00) (12 - 33)  SpO2: 97% (24 @ 15:00) (83% - 100%)  Wt(kg): --Vital Signs Last 24 Hrs  T(C): 36.9 (2024 12:00), Max: 36.9 (2024 12:00)  T(F): 98.4 (2024 12:00), Max: 98.4 (2024 12:00)  HR: 80 (2024 15:00) (65 - 84)  BP: 134/62 (2024 15:00) (63/47 - 174/77)  BP(mean): 89 (2024 15:00) (50 - 99)  RR: 19 (2024 15:00) (12 - 33)  SpO2: 97% (2024 15:00) (83% - 100%)    Parameters below as of 2024 10:33  Patient On (Oxygen Delivery Method): nasal cannula    PHYSICAL EXAM:  GENERAL: NAD, well-groomed, well-developed  HEAD:  Atraumatic, Normocephalic  EYES: EOMI, PERRLA, conjunctiva and sclera clear  ENMT:  Moist mucous membranes  NECK: Supple, No JVD,  CHEST/LUNG: Clear to auscultation bilaterally; No rales, rhonchi, wheezing, or rubs  HEART: Regular rate and rhythm; No murmurs, rubs, or gallops  ABDOMEN: Mary Carmen drain in place. Distended. Soft, Nontender; Bowel sounds present  NEURO: Alert & Oriented X3  EXTREMITIES: No LE edema, no calf tenderness  LYMPH: No lymphadenopathy noted  SKIN: No rashes or lesions    Consultant(s) Notes Reviewed:  [x ] YES  [ ] NO  Care Discussed with Consultants/Other Providers [ x] YES  [ ] NO    LABS:                        8.2    7.97  )-----------( 298      ( 2024 23:31 )             28.2     07-05    139  |  98  |  34<H>  ----------------------------<  85  3.7   |  31  |  0.43<L>    Ca    8.3<L>      2024 23:31  Phos  3.4     07-05  Mg     1.8     07-05    TPro  5.3<L>  /  Alb  2.7<L>  /  TBili  0.4  /  DBili  x   /  AST  12  /  ALT  7<L>  /  AlkPhos  93  07-05    PT/INR - ( 2024 23:31 )   PT: 13.3 sec;   INR: 1.22 ratio      PTT - ( 2024 18:24 )  PTT:28.0 sec  Urinalysis Basic - ( 2024 00:46 )    Color: Dark Yellow / Appearance: Clear / S.015 / pH: x  Gluc: x / Ketone: Negative mg/dL  / Bili: Negative / Urobili: 0.2 mg/dL   Blood: x / Protein: Negative mg/dL / Nitrite: Negative   Leuk Esterase: Large / RBC: 2 /HPF / WBC 5 /HPF   Sq Epi: x / Non Sq Epi: 7 /HPF / Bacteria: Few /HPF    CAPILLARY BLOOD GLUCOSE  POCT Blood Glucose.: 93 mg/dL (2024 16:58)    ABG - ( 2024 18:23 )  pH, Arterial: 7.40  pH, Blood: x     /  pCO2: 56    /  pO2: 133   / HCO3: 35    / Base Excess: 8.8   /  SaO2: 98.1      Urinalysis Basic - ( 2024 00:46 )    Color: Dark Yellow / Appearance: Clear / S.015 / pH: x  Gluc: x / Ketone: Negative mg/dL  / Bili: Negative / Urobili: 0.2 mg/dL   Blood: x / Protein: Negative mg/dL / Nitrite: Negative   Leuk Esterase: Large / RBC: 2 /HPF / WBC 5 /HPF   Sq Epi: x / Non Sq Epi: 7 /HPF / Bacteria: Few /HPF    RADIOLOGY & ADDITIONAL TESTS:    Imaging Personally Reviewed:  [x ] YES  [ ] NO      Assessment/Plan:     83 year old woman with COPD not on O2, obesity, hypertension, HFpEF, BCC, hypothyroidism, psoriatic arthritis, SUZIE, metastatic ovarian cancer and perforated gallbladder who presenting for hypotension after paracentesis.    Neuro  No active Issues    CV    #Shock  Hypovolemic 2/2 paracentesis vs anemia less likely septic  POCUS showing collapsed IVC  - hemoglobin decrease from 9.6 to 7.6  - CT Angio to assess for peritoneal bleed - no active bleed  - continue gentle colloid vs blood resuscitation  - holding metoprolol tartrate    #History of HFpEF  ?CAD  - c/w ASA  - c/w pravastatin    Respiratory  #COPD  - Advair  On 4L NC,   7.40/56/133/35 98%     #Asthma  - singulair    Renal  #R hydronephrosis  Cr. 0.42 at baseline, UOP nonoliguric  - New R hydronephrosis visualized on CT, possible mass effect    Endocrine    #Hypothyroidism  - synthroid    No active issues    GI  Hx of perforated gall bladder  -maintain mary carmen drain    Diet    ID  No indication abx    Hematology    #Anemia  Likely blood loss anemia vs dilutional    DVT ppx  -hold chemical ppx while possibly bleeding  -SCDs        Metastatic Ovarian Cancer  - Pain controlled.     GOC    Previous DNR/DNI with trial of intubation??  -will discuss GOC with patient and family MICU DOWN GRADE NOTE  ----------------------------------------    Transfer from: MICU   Transfer to: (X) Medicine         (   ) Telemetry         (   )  RCU     (   ) Palliative          (    ) Stroke Unit  Accepting physician: Dr. Schulz    ----------------------------------------    HPI: 83 year old woman with COPD not on O2, obesity, hypertension, HFpEF, BCC, hypothyroidism, psoriatic arthritis, SUZIE, metastatic ovarian cancer and perforated gallbladder who presenting for hypotension after paracentesis. Patient had 5 L of fluid drained from her abdomen after which she became hypotensive and required jasmin gtt.    INTERVAL HPI/OVERNIGHT EVENTS:  Following admission to the MICU, patient requiring progressively less pressor requirements. Home anti-HTNs were held due to concern for hypotension. Patient with CT imaging of abdomen showing no active bleed prior to being downgraded from unit; per IR does not require urgent intervention.    To do:  [ ] Restart home lasix, metoprolol    REVIEW OF SYSTEMS:  CONSTITUTIONAL: No fever, chills  HEENT:  No blurry vision No sinus or throat pain  NECK: No pain or stiffness  RESPIRATORY: No cough, wheezing, chills or hemoptysis; No shortness of breath  CARDIOVASCULAR: No chest pain, palpitations  GASTROINTESTINAL: No abdominal pain. No nausea, vomiting, or diarrhea  GENITOURINARY: No dysuria  NEUROLOGICAL: No HA, No focal weakness  SKIN: No itching, burning, rashes, or lesions   MUSCULOSKELETAL: No joint pain or swelling; No muscle, back, or extremity pain    MEDICATIONS:  albuterol    90 MICROgram(s) HFA Inhaler 2 Puff(s) Inhalation every 6 hours PRN  aspirin enteric coated 81 milliGRAM(s) Oral daily  fluticasone propionate 50 MICROgram(s)/spray Nasal Spray 1 Spray(s) Both Nostrils two times a day  levothyroxine 50 MICROGram(s) Oral daily  montelukast 10 milliGRAM(s) Oral daily  nystatin Powder 1 Application(s) Topical two times a day  oxybutynin 10 milliGRAM(s) Oral every 24 hours  phenylephrine    Infusion 0.5 MICROgram(s)/kG/Min IV Continuous <Continuous>    T(C): 36.9 (24 @ 12:00), Max: 36.9 (24 @ 12:00)  HR: 80 (24 @ 15:00) (65 - 84)  BP: 134/62 (24 @ 15:00) (63/47 - 174/77)  RR: 19 (24 @ 15:00) (12 - 33)  SpO2: 97% (24 @ 15:00) (83% - 100%)  Wt(kg): --Vital Signs Last 24 Hrs  T(C): 36.9 (2024 12:00), Max: 36.9 (2024 12:00)  T(F): 98.4 (2024 12:00), Max: 98.4 (2024 12:00)  HR: 80 (2024 15:00) (65 - 84)  BP: 134/62 (2024 15:00) (63/47 - 174/77)  BP(mean): 89 (2024 15:00) (50 - 99)  RR: 19 (2024 15:00) (12 - 33)  SpO2: 97% (2024 15:00) (83% - 100%)    Parameters below as of 2024 10:33  Patient On (Oxygen Delivery Method): nasal cannula    PHYSICAL EXAM:  GENERAL: NAD, well-groomed, well-developed  HEAD:  Atraumatic, Normocephalic  EYES: EOMI, PERRLA, conjunctiva and sclera clear  ENMT:  Moist mucous membranes  NECK: Supple, No JVD,  CHEST/LUNG: Clear to auscultation bilaterally; No rales, rhonchi, wheezing, or rubs  HEART: Regular rate and rhythm; No murmurs, rubs, or gallops  ABDOMEN: Mary Carmen drain in place. Distended. Soft, Nontender; Bowel sounds present  NEURO: Alert & Oriented X3  EXTREMITIES: No LE edema, no calf tenderness  LYMPH: No lymphadenopathy noted  SKIN: No rashes or lesions    Consultant(s) Notes Reviewed:  [x ] YES  [ ] NO  Care Discussed with Consultants/Other Providers [ x] YES  [ ] NO    LABS:                        8.2    7.97  )-----------( 298      ( 2024 23:31 )             28.2     07-05    139  |  98  |  34<H>  ----------------------------<  85  3.7   |  31  |  0.43<L>    Ca    8.3<L>      2024 23:31  Phos  3.4     07-05  Mg     1.8     07-05    TPro  5.3<L>  /  Alb  2.7<L>  /  TBili  0.4  /  DBili  x   /  AST  12  /  ALT  7<L>  /  AlkPhos  93  07-05    PT/INR - ( 2024 23:31 )   PT: 13.3 sec;   INR: 1.22 ratio      PTT - ( 2024 18:24 )  PTT:28.0 sec  Urinalysis Basic - ( 2024 00:46 )    Color: Dark Yellow / Appearance: Clear / S.015 / pH: x  Gluc: x / Ketone: Negative mg/dL  / Bili: Negative / Urobili: 0.2 mg/dL   Blood: x / Protein: Negative mg/dL / Nitrite: Negative   Leuk Esterase: Large / RBC: 2 /HPF / WBC 5 /HPF   Sq Epi: x / Non Sq Epi: 7 /HPF / Bacteria: Few /HPF    CAPILLARY BLOOD GLUCOSE  POCT Blood Glucose.: 93 mg/dL (2024 16:58)    ABG - ( 2024 18:23 )  pH, Arterial: 7.40  pH, Blood: x     /  pCO2: 56    /  pO2: 133   / HCO3: 35    / Base Excess: 8.8   /  SaO2: 98.1      Urinalysis Basic - ( 2024 00:46 )    Color: Dark Yellow / Appearance: Clear / S.015 / pH: x  Gluc: x / Ketone: Negative mg/dL  / Bili: Negative / Urobili: 0.2 mg/dL   Blood: x / Protein: Negative mg/dL / Nitrite: Negative   Leuk Esterase: Large / RBC: 2 /HPF / WBC 5 /HPF   Sq Epi: x / Non Sq Epi: 7 /HPF / Bacteria: Few /HPF    RADIOLOGY & ADDITIONAL TESTS:    Imaging Personally Reviewed:  [x ] YES  [ ] NO      Assessment/Plan:     83 year old woman with COPD not on O2, obesity, hypertension, HFpEF, BCC, hypothyroidism, psoriatic arthritis, SUZIE, metastatic ovarian cancer and perforated gallbladder who presenting for hypotension after paracentesis.    Neuro  No active Issues    CV    #Shock  Hypovolemic 2/2 paracentesis vs anemia less likely septic  POCUS showing collapsed IVC  - hemoglobin decrease from 9.6 to 7.6  - CT Angio to assess for peritoneal bleed - no active bleed  - continue gentle colloid vs blood resuscitation  - holding metoprolol tartrate    #History of HFpEF  ?CAD  - c/w ASA  - c/w pravastatin    Respiratory  #COPD  - Advair  On 4L NC,   7.40/56/133/35 98%     #Asthma  - singulair    Renal  #R hydronephrosis  Cr. 0.42 at baseline, UOP nonoliguric  - New R hydronephrosis visualized on CT, possible mass effect    Endocrine    #Hypothyroidism  - synthroid    No active issues    GI  Hx of perforated gall bladder  -maintain mary carmen drain    Diet    ID  No indication abx    Hematology    #Anemia  Likely blood loss anemia vs dilutional    DVT ppx  -hold chemical ppx while possibly bleeding  -SCDs        Metastatic Ovarian Cancer  - Pain controlled.     GOC    Previous DNR/DNI with trial of intubation??  -will discuss GOC with patient and family MICU DOWN GRADE NOTE  ----------------------------------------    Transfer from: MICU   Transfer to: (X) Medicine         (   ) Telemetry         (   )  RCU     (   ) Palliative          (    ) Stroke Unit  Accepting physician: Dr. Schulz    ----------------------------------------    HPI: 83 year old woman with COPD not on O2, obesity, hypertension, HFpEF, BCC, hypothyroidism, psoriatic arthritis, SUZIE, metastatic ovarian cancer and perforated gallbladder who presented following abdominal Pleurx catheter placement with 5 L of fluid drained, after which she became hypotensive, refractory to fluid bolus and albumin repletion, requiring jasmin gtt.    INTERVAL HPI/OVERNIGHT EVENTS:  Following admission to the MICU, patient was titrated off phenylephrine, and has remained hemodynamically stable. CT imaging of abdomen/pelvis demonstrated no large hemoperitoneum or active bleed, does show moderate size b/l pleural effusions and mild right hydronephrosis. Per IR patient does not require intervention at this time.     To do:  [ ] Restart home Lasix and metoprolol when appropriate       REVIEW OF SYSTEMS:  CONSTITUTIONAL: No fever, chills  HEENT:  No blurry vision No sinus or throat pain  NECK: No pain or stiffness  RESPIRATORY: No cough, wheezing, chills or hemoptysis; No shortness of breath  CARDIOVASCULAR: No chest pain, palpitations  GASTROINTESTINAL: No abdominal pain. No nausea, vomiting, or diarrhea  GENITOURINARY: No dysuria  NEUROLOGICAL: No HA, No focal weakness  SKIN: No itching, burning, rashes, or lesions   MUSCULOSKELETAL: No joint pain or swelling; No muscle, back, or extremity pain    MEDICATIONS:  albuterol    90 MICROgram(s) HFA Inhaler 2 Puff(s) Inhalation every 6 hours PRN  aspirin enteric coated 81 milliGRAM(s) Oral daily  fluticasone propionate 50 MICROgram(s)/spray Nasal Spray 1 Spray(s) Both Nostrils two times a day  levothyroxine 50 MICROGram(s) Oral daily  montelukast 10 milliGRAM(s) Oral daily  nystatin Powder 1 Application(s) Topical two times a day  oxybutynin 10 milliGRAM(s) Oral every 24 hours    T(C): 36.9 (24 @ 12:00), Max: 36.9 (24 @ 12:00)  HR: 80 (24 @ 15:00) (65 - 84)  BP: 134/62 (24 @ 15:00) (63/47 - 174/77)  RR: 19 (24 @ 15:00) (12 - 33)  SpO2: 97% (24 @ 15:00) (83% - 100%)  Wt(kg): --Vital Signs Last 24 Hrs  T(C): 36.9 (2024 12:00), Max: 36.9 (2024 12:00)  T(F): 98.4 (2024 12:00), Max: 98.4 (2024 12:00)  HR: 80 (2024 15:00) (65 - 84)  BP: 134/62 (2024 15:00) (63/47 - 174/77)  BP(mean): 89 (2024 15:00) (50 - 99)  RR: 19 (2024 15:00) (12 - 33)  SpO2: 97% (2024 15:00) (83% - 100%)    Parameters below as of 2024 10:33  Patient On (Oxygen Delivery Method): nasal cannula    PHYSICAL EXAM:  GENERAL: NAD, well-groomed, well-developed  HEAD:  Atraumatic, Normocephalic  EYES: EOMI, PERRLA, conjunctiva and sclera clear  ENMT:  Moist mucous membranes  NECK: Supple, No JVD,  CHEST/LUNG: Clear to auscultation bilaterally; No rales, rhonchi, wheezing, or rubs  HEART: Regular rate and rhythm; No murmurs, rubs, or gallops  ABDOMEN: Mary Carmen drain in place. Distended. Soft, Nontender; Bowel sounds present  NEURO: Alert & Oriented X3  EXTREMITIES: No LE edema, no calf tenderness  LYMPH: No lymphadenopathy noted  SKIN: No rashes or lesions    Consultant(s) Notes Reviewed:  [x ] YES  [ ] NO  Care Discussed with Consultants/Other Providers [ x] YES  [ ] NO    LABS:                        8.2    7.97  )-----------( 298      ( 2024 23:31 )             28.2     07-05    139  |  98  |  34<H>  ----------------------------<  85  3.7   |  31  |  0.43<L>    Ca    8.3<L>      2024 23:31  Phos  3.4     07-05  Mg     1.8     07-05    TPro  5.3<L>  /  Alb  2.7<L>  /  TBili  0.4  /  DBili  x   /  AST  12  /  ALT  7<L>  /  AlkPhos  93  07-05    PT/INR - ( 2024 23:31 )   PT: 13.3 sec;   INR: 1.22 ratio      PTT - ( 2024 18:24 )  PTT:28.0 sec  Urinalysis Basic - ( 2024 00:46 )    Color: Dark Yellow / Appearance: Clear / S.015 / pH: x  Gluc: x / Ketone: Negative mg/dL  / Bili: Negative / Urobili: 0.2 mg/dL   Blood: x / Protein: Negative mg/dL / Nitrite: Negative   Leuk Esterase: Large / RBC: 2 /HPF / WBC 5 /HPF   Sq Epi: x / Non Sq Epi: 7 /HPF / Bacteria: Few /HPF    CAPILLARY BLOOD GLUCOSE  POCT Blood Glucose.: 93 mg/dL (2024 16:58)    ABG - ( 2024 18:23 )  pH, Arterial: 7.40  pH, Blood: x     /  pCO2: 56    /  pO2: 133   / HCO3: 35    / Base Excess: 8.8   /  SaO2: 98.1      Urinalysis Basic - ( 2024 00:46 )    Color: Dark Yellow / Appearance: Clear / S.015 / pH: x  Gluc: x / Ketone: Negative mg/dL  / Bili: Negative / Urobili: 0.2 mg/dL   Blood: x / Protein: Negative mg/dL / Nitrite: Negative   Leuk Esterase: Large / RBC: 2 /HPF / WBC 5 /HPF   Sq Epi: x / Non Sq Epi: 7 /HPF / Bacteria: Few /HPF          Assessment/Plan:     83 year old woman with COPD not on O2, obesity, hypertension, HFpEF, BCC, hypothyroidism, psoriatic arthritis, SUZIE, metastatic ovarian cancer and perforated gallbladder who presenting for hypotension after paracentesis.    Neuro  No active Issues    CV  #Resolved Shock- Hypovolemic 2/2 paracentesis  - hemoglobin decrease from 9.6 to 7.6  - CT A/P with IV con 24 demonstrated no active bleed  - holding home metoprolol tartrate    #History of HFpEF  ?CAD  - c/w ASA    Respiratory  #COPD, Asthma   - c/w Albuterol PRN  - c/w Advair   - c/w singular  - On 2L NC    GI  #Hx of perforated gall bladder  -Maintain mary carmen drain    Renal  #R hydronephrosis  - Cr. 0.42 at baseline, UOP nonoliguric  - New R hydronephrosis visualized on CT, possible mass effect  - Monitor serum creatinine       Metastatic Ovarian Cancer  - Pain controlled    Endocrine  #Hypothyroidism  - c/w synthroid    ID  No indication abx    Hematology  #Improved Anemia  Likely blood loss anemia vs dilutional    DVT ppx  -SCDs      GOC  Previous DNR/DNI with trial of intubation??  -will discuss GOC with patient and family MICU DOWN GRADE NOTE  ----------------------------------------    Transfer from: MICU   Transfer to: (X) Medicine         (   ) Telemetry         (   )  RCU     (   ) Palliative          (    ) Stroke Unit  Accepting physician: Dr. Schulz    ----------------------------------------    HPI: 83 year old woman with COPD not on O2, obesity, hypertension, HFpEF, BCC, hypothyroidism, psoriatic arthritis, SUZIE, metastatic ovarian cancer and perforated gallbladder who presented following abdominal Pleurx catheter placement with 5 L of fluid drained, after which she became hypotensive, refractory to fluid bolus and albumin repletion, requiring jasmin gtt.    INTERVAL HPI/OVERNIGHT EVENTS:  Following admission to the MICU, patient was titrated off phenylephrine, and has remained hemodynamically stable. CT imaging of abdomen/pelvis demonstrated no large hemoperitoneum or active bleed, does show moderate size b/l pleural effusions and mild right hydronephrosis. Per IR patient does not require intervention at this time.     To do:  [ ] Restart home Lasix and metoprolol when appropriate       REVIEW OF SYSTEMS:  CONSTITUTIONAL: No fever, chills  HEENT:  No blurry vision No sinus or throat pain  NECK: No pain or stiffness  RESPIRATORY: No cough, wheezing, chills or hemoptysis; No shortness of breath  CARDIOVASCULAR: No chest pain, palpitations  GASTROINTESTINAL: No abdominal pain. No nausea, vomiting, or diarrhea  GENITOURINARY: No dysuria  NEUROLOGICAL: No HA, No focal weakness  SKIN: No itching, burning, rashes, or lesions   MUSCULOSKELETAL: No joint pain or swelling; No muscle, back, or extremity pain    MEDICATIONS:  albuterol    90 MICROgram(s) HFA Inhaler 2 Puff(s) Inhalation every 6 hours PRN  aspirin enteric coated 81 milliGRAM(s) Oral daily  fluticasone propionate 50 MICROgram(s)/spray Nasal Spray 1 Spray(s) Both Nostrils two times a day  levothyroxine 50 MICROGram(s) Oral daily  montelukast 10 milliGRAM(s) Oral daily  nystatin Powder 1 Application(s) Topical two times a day  oxybutynin 10 milliGRAM(s) Oral every 24 hours    T(C): 36.9 (24 @ 12:00), Max: 36.9 (24 @ 12:00)  HR: 80 (24 @ 15:00) (65 - 84)  BP: 134/62 (24 @ 15:00) (63/47 - 174/77)  RR: 19 (24 @ 15:00) (12 - 33)  SpO2: 97% (24 @ 15:00) (83% - 100%)  Wt(kg): --Vital Signs Last 24 Hrs  T(C): 36.9 (2024 12:00), Max: 36.9 (2024 12:00)  T(F): 98.4 (2024 12:00), Max: 98.4 (2024 12:00)  HR: 80 (2024 15:00) (65 - 84)  BP: 134/62 (2024 15:00) (63/47 - 174/77)  BP(mean): 89 (2024 15:00) (50 - 99)  RR: 19 (2024 15:00) (12 - 33)  SpO2: 97% (2024 15:00) (83% - 100%)    Parameters below as of 2024 10:33  Patient On (Oxygen Delivery Method): nasal cannula    PHYSICAL EXAM:  GENERAL: NAD, well-groomed, well-developed  HEAD:  Atraumatic, Normocephalic  EYES: EOMI, PERRLA, conjunctiva and sclera clear  ENMT:  Moist mucous membranes  NECK: Supple, No JVD,  CHEST/LUNG: Clear to auscultation bilaterally; No rales, rhonchi, wheezing, or rubs  HEART: Regular rate and rhythm; No murmurs, rubs, or gallops  ABDOMEN: Mary Carmen drain in place. Distended. Soft, Nontender; Bowel sounds present  NEURO: Alert & Oriented X3  EXTREMITIES: No LE edema, no calf tenderness  LYMPH: No lymphadenopathy noted  SKIN: No rashes or lesions    Consultant(s) Notes Reviewed:  [x ] YES  [ ] NO  Care Discussed with Consultants/Other Providers [ x] YES  [ ] NO    LABS:                        8.2    7.97  )-----------( 298      ( 2024 23:31 )             28.2     07-05    139  |  98  |  34<H>  ----------------------------<  85  3.7   |  31  |  0.43<L>    Ca    8.3<L>      2024 23:31  Phos  3.4     07-05  Mg     1.8     07-05    TPro  5.3<L>  /  Alb  2.7<L>  /  TBili  0.4  /  DBili  x   /  AST  12  /  ALT  7<L>  /  AlkPhos  93  07-05    PT/INR - ( 2024 23:31 )   PT: 13.3 sec;   INR: 1.22 ratio      PTT - ( 2024 18:24 )  PTT:28.0 sec  Urinalysis Basic - ( 2024 00:46 )    Color: Dark Yellow / Appearance: Clear / S.015 / pH: x  Gluc: x / Ketone: Negative mg/dL  / Bili: Negative / Urobili: 0.2 mg/dL   Blood: x / Protein: Negative mg/dL / Nitrite: Negative   Leuk Esterase: Large / RBC: 2 /HPF / WBC 5 /HPF   Sq Epi: x / Non Sq Epi: 7 /HPF / Bacteria: Few /HPF    CAPILLARY BLOOD GLUCOSE  POCT Blood Glucose.: 93 mg/dL (2024 16:58)    ABG - ( 2024 18:23 )  pH, Arterial: 7.40  pH, Blood: x     /  pCO2: 56    /  pO2: 133   / HCO3: 35    / Base Excess: 8.8   /  SaO2: 98.1      Urinalysis Basic - ( 2024 00:46 )    Color: Dark Yellow / Appearance: Clear / S.015 / pH: x  Gluc: x / Ketone: Negative mg/dL  / Bili: Negative / Urobili: 0.2 mg/dL   Blood: x / Protein: Negative mg/dL / Nitrite: Negative   Leuk Esterase: Large / RBC: 2 /HPF / WBC 5 /HPF   Sq Epi: x / Non Sq Epi: 7 /HPF / Bacteria: Few /HPF          Assessment/Plan:     83 year old woman with COPD not on O2, obesity, hypertension, HFpEF, BCC, hypothyroidism, psoriatic arthritis, SUZIE, metastatic ovarian cancer and perforated gallbladder who presenting for hypotension after paracentesis.    Neuro  No active Issues    CV  #Resolved Shock- Hypovolemic 2/2 paracentesis  - hemoglobin decrease from 9.6 to 7.6  - CT A/P with IV con 24 demonstrated no active bleed  - holding home metoprolol tartrate    #History of HFpEF  ?CAD  - c/w ASA    Respiratory  #COPD, Asthma   - c/w Albuterol PRN  - c/w Advair   - c/w singular  - On 2L NC    #SUZIE  - CPAP night     GI  #Hx of perforated gall bladder  -Maintain mary carmen drain    Renal  #R hydronephrosis  - Cr. 0.42 at baseline, UOP nonoliguric  - New R hydronephrosis visualized on CT, possible mass effect  - Monitor serum creatinine       Metastatic Ovarian Cancer  - Pain controlled    Endocrine  #Hypothyroidism  - c/w synthroid    ID  No indication abx    Hematology  #Improved Anemia  Likely blood loss anemia vs dilutional    DVT ppx  -SCDs      GOC  Previous DNR/DNI with trial of intubation??  -will discuss GOC with patient and family MICU DOWN GRADE NOTE  ----------------------------------------    Transfer from: MICU   Transfer to: (X) Medicine         (   ) Telemetry         (   )  RCU     (   ) Palliative          (    ) Stroke Unit  Accepting physician: Dr. Schulz    ----------------------------------------    HPI: 83 year old woman with COPD not on O2, obesity, hypertension, HFpEF, BCC, hypothyroidism, psoriatic arthritis, SUZIE, metastatic ovarian cancer and perforated gallbladder who presented following abdominal Pleurx catheter placement with 5 L of fluid drained, after which she became hypotensive, refractory to fluid bolus and albumin repletion, requiring jasmin gtt.    INTERVAL HPI/OVERNIGHT EVENTS:  Following admission to the MICU, patient was titrated off phenylephrine, and has remained hemodynamically stable. CT imaging of abdomen/pelvis demonstrated no large hemoperitoneum or active bleed, does show moderate size b/l pleural effusions and mild right hydronephrosis. Per IR patient does not require intervention at this time.     To do:  [ ] Restart home Lasix and metoprolol when appropriate       REVIEW OF SYSTEMS:  CONSTITUTIONAL: No fever, chills  HEENT:  No blurry vision No sinus or throat pain  NECK: No pain or stiffness  RESPIRATORY: No cough, wheezing, chills or hemoptysis; No shortness of breath  CARDIOVASCULAR: No chest pain, palpitations  GASTROINTESTINAL: No abdominal pain. No nausea, vomiting, or diarrhea  GENITOURINARY: No dysuria  NEUROLOGICAL: No HA, No focal weakness  SKIN: No itching, burning, rashes, or lesions   MUSCULOSKELETAL: No joint pain or swelling; No muscle, back, or extremity pain    MEDICATIONS:  albuterol    90 MICROgram(s) HFA Inhaler 2 Puff(s) Inhalation every 6 hours PRN  aspirin enteric coated 81 milliGRAM(s) Oral daily  fluticasone propionate 50 MICROgram(s)/spray Nasal Spray 1 Spray(s) Both Nostrils two times a day  levothyroxine 50 MICROGram(s) Oral daily  montelukast 10 milliGRAM(s) Oral daily  nystatin Powder 1 Application(s) Topical two times a day  oxybutynin 10 milliGRAM(s) Oral every 24 hours    T(C): 36.9 (24 @ 12:00), Max: 36.9 (24 @ 12:00)  HR: 80 (24 @ 15:00) (65 - 84)  BP: 134/62 (24 @ 15:00) (63/47 - 174/77)  RR: 19 (24 @ 15:00) (12 - 33)  SpO2: 97% (24 @ 15:00) (83% - 100%)  Wt(kg): --Vital Signs Last 24 Hrs  T(C): 36.9 (2024 12:00), Max: 36.9 (2024 12:00)  T(F): 98.4 (2024 12:00), Max: 98.4 (2024 12:00)  HR: 80 (2024 15:00) (65 - 84)  BP: 134/62 (2024 15:00) (63/47 - 174/77)  BP(mean): 89 (2024 15:00) (50 - 99)  RR: 19 (2024 15:00) (12 - 33)  SpO2: 97% (2024 15:00) (83% - 100%)    Parameters below as of 2024 10:33  Patient On (Oxygen Delivery Method): nasal cannula    PHYSICAL EXAM:  GENERAL: NAD, well-groomed, well-developed  HEAD:  Atraumatic, Normocephalic  EYES: EOMI, PERRLA, conjunctiva and sclera clear  ENMT:  Moist mucous membranes  NECK: Supple, No JVD,  CHEST/LUNG: Clear to auscultation bilaterally; No rales, rhonchi, wheezing, or rubs  HEART: Regular rate and rhythm; No murmurs, rubs, or gallops  ABDOMEN: Mary Carmen drain in place. Distended. Soft, Nontender; Bowel sounds present  NEURO: Alert & Oriented X3  EXTREMITIES: No LE edema, no calf tenderness  LYMPH: No lymphadenopathy noted  SKIN: No rashes or lesions    Consultant(s) Notes Reviewed:  [x ] YES  [ ] NO  Care Discussed with Consultants/Other Providers [ x] YES  [ ] NO    LABS:                        8.2    7.97  )-----------( 298      ( 2024 23:31 )             28.2     07-05    139  |  98  |  34<H>  ----------------------------<  85  3.7   |  31  |  0.43<L>    Ca    8.3<L>      2024 23:31  Phos  3.4     07-05  Mg     1.8     07-05    TPro  5.3<L>  /  Alb  2.7<L>  /  TBili  0.4  /  DBili  x   /  AST  12  /  ALT  7<L>  /  AlkPhos  93  07-05    PT/INR - ( 2024 23:31 )   PT: 13.3 sec;   INR: 1.22 ratio      PTT - ( 2024 18:24 )  PTT:28.0 sec  Urinalysis Basic - ( 2024 00:46 )    Color: Dark Yellow / Appearance: Clear / S.015 / pH: x  Gluc: x / Ketone: Negative mg/dL  / Bili: Negative / Urobili: 0.2 mg/dL   Blood: x / Protein: Negative mg/dL / Nitrite: Negative   Leuk Esterase: Large / RBC: 2 /HPF / WBC 5 /HPF   Sq Epi: x / Non Sq Epi: 7 /HPF / Bacteria: Few /HPF    CAPILLARY BLOOD GLUCOSE  POCT Blood Glucose.: 93 mg/dL (2024 16:58)    ABG - ( 2024 18:23 )  pH, Arterial: 7.40  pH, Blood: x     /  pCO2: 56    /  pO2: 133   / HCO3: 35    / Base Excess: 8.8   /  SaO2: 98.1      Urinalysis Basic - ( 2024 00:46 )    Color: Dark Yellow / Appearance: Clear / S.015 / pH: x  Gluc: x / Ketone: Negative mg/dL  / Bili: Negative / Urobili: 0.2 mg/dL   Blood: x / Protein: Negative mg/dL / Nitrite: Negative   Leuk Esterase: Large / RBC: 2 /HPF / WBC 5 /HPF   Sq Epi: x / Non Sq Epi: 7 /HPF / Bacteria: Few /HPF          Assessment/Plan:     83 year old woman with COPD not on O2, obesity, hypertension, HFpEF, BCC, hypothyroidism, psoriatic arthritis, SUZIE, metastatic ovarian cancer and perforated gallbladder who presenting for hypotension after paracentesis.    Neuro  No active Issues    CV  #Resolved Shock- Hypovolemic 2/2 paracentesis  - hemoglobin decrease from 9.6 to 7.6  - CT A/P with IV con 24 demonstrated no active bleed  - holding home metoprolol tartrate    #History of HFpEF  ?CAD  - c/w ASA    Respiratory  #COPD, Asthma   - c/w Albuterol PRN  - c/w Advair   - c/w singular  - On 2L NC    #SUZIE  - CPAP night     GI  #Hx of perforated gall bladder  -Maintain mary carmen drain    Renal  #R hydronephrosis  - Cr. 0.42 at baseline, UOP nonoliguric  - New R hydronephrosis visualized on CT, possible mass effect  - Monitor serum creatinine       Metastatic Ovarian Cancer  - Pain controlled    Endocrine  #Hypothyroidism  - c/w synthroid    ID  No indication abx    Hematology  #Improved Anemia  Likely blood loss anemia vs dilutional    DVT ppx  -start HepSQ    GOC  Previous DNR/DNI with trial of intubation??  -will discuss GOC with patient and family

## 2024-07-06 NOTE — OCCUPATIONAL THERAPY INITIAL EVALUATION ADULT - RANGE OF MOTION EXAMINATION, LOWER EXTREMITY
hips/knees limited to ~3/4 range/bilateral LE Active Assistive ROM was WFL  (within functional limits)

## 2024-07-06 NOTE — PROGRESS NOTE ADULT - SUBJECTIVE AND OBJECTIVE BOX
INTERVAL HPI/OVERNIGHT EVENTS:    SUBJECTIVE: Patient seen and examined at bedside.     CONSTITUTIONAL: No weakness, fevers or chills  EYES/ENT: No visual changes;  No vertigo or throat pain   NECK: No pain or stiffness  RESPIRATORY: No cough, wheezing, hemoptysis; No shortness of breath  CARDIOVASCULAR: No chest pain or palpitations  GASTROINTESTINAL: No abdominal or epigastric pain. No nausea, vomiting, or hematemesis; No diarrhea or constipation. No melena or hematochezia.  GENITOURINARY: No dysuria, frequency or hematuria  NEUROLOGICAL: No numbness or weakness  SKIN: No itching, rashes    OBJECTIVE:    VITAL SIGNS:  ICU Vital Signs Last 24 Hrs  T(C): 36.7 (2024 04:00), Max: 36.7 (2024 04:00)  T(F): 98 (2024 04:00), Max: 98 (2024 04:00)  HR: 75 (2024 06:00) (50 - 86)  BP: 99/63 (2024 06:00) (63/47 - 134/74)  BP(mean): 76 (2024 06:00) (39 - 99)  ABP: --  ABP(mean): --  RR: 26 (2024 06:00) (12 - 33)  SpO2: 94% (2024 06:00) (83% - 100%)    O2 Parameters below as of 2024 21:30  Patient On (Oxygen Delivery Method): nasal cannula  O2 Flow (L/min): 2    -05 @ 07:01  -  07-06 @ 07:00  --------------------------------------------------------  IN: 950.3 mL / OUT: 1105 mL / NET: -154.7 mL    CAPILLARY BLOOD GLUCOSE  POCT Blood Glucose.: 93 mg/dL (2024 16:58)    PHYSICAL EXAM:    General: NAD  HEENT: NC/AT; PERRL, clear conjunctiva  Neck: supple  Respiratory: CTA b/l  Cardiovascular: +S1/S2; RRR  Abdomen: Distended, soft, dimple and perit drains in place  Extremities: WWP, 2+ peripheral pulses b/l; 2+ b/l LE edema  Skin: normal color and turgor; no rash  Neurological:    MEDICATIONS:  MEDICATIONS  (STANDING):  aspirin enteric coated 81 milliGRAM(s) Oral daily  fluticasone propionate 50 MICROgram(s)/spray Nasal Spray 1 Spray(s) Both Nostrils two times a day  levothyroxine 50 MICROGram(s) Oral daily  montelukast 10 milliGRAM(s) Oral daily  oxybutynin 10 milliGRAM(s) Oral every 24 hours  phenylephrine    Infusion 0.5 MICROgram(s)/kG/Min (23.8 mL/Hr) IV Continuous <Continuous>    MEDICATIONS  (PRN):  albuterol    90 MICROgram(s) HFA Inhaler 2 Puff(s) Inhalation every 6 hours PRN Shortness of Breath and/or Wheezing    ALLERGIES:  Allergies    latex (Rash)  phenobarbital (Rash)  erythromycin (Rash)    Intolerances    Milk (Rash)      LABS:                        8.2    7.97  )-----------( 298      ( 2024 23:31 )             28.2     07-05    139  |  98  |  34<H>  ----------------------------<  85  3.7   |  31  |  0.43<L>    Ca    8.3<L>      2024 23:31  Phos  3.4     07-05  Mg     1.8     07-05    TPro  5.3<L>  /  Alb  2.7<L>  /  TBili  0.4  /  DBili  x   /  AST  12  /  ALT  7<L>  /  AlkPhos  93  07-05    PT/INR - ( 2024 23:31 )   PT: 13.3 sec;   INR: 1.22 ratio      PTT - ( 2024 18:24 )  PTT:28.0 sec  Urinalysis Basic - ( 2024 00:46 )    Color: Dark Yellow / Appearance: Clear / S.015 / pH: x  Gluc: x / Ketone: Negative mg/dL  / Bili: Negative / Urobili: 0.2 mg/dL   Blood: x / Protein: Negative mg/dL / Nitrite: Negative   Leuk Esterase: Large / RBC: 2 /HPF / WBC 5 /HPF   Sq Epi: x / Non Sq Epi: 7 /HPF / Bacteria: Few /HPF    RADIOLOGY & ADDITIONAL TESTS: Reviewed. INTERVAL HPI/OVERNIGHT EVENTS:    SUBJECTIVE: Patient seen and examined at bedside. No acute events overnight. Patient without complaints this AM. Denies CP, SOB, subjective fever, chills, n/v/d.    CONSTITUTIONAL: No weakness, fevers or chills  EYES/ENT: No visual changes;  No vertigo or throat pain   NECK: No pain or stiffness  RESPIRATORY: No cough, wheezing, hemoptysis; No shortness of breath  CARDIOVASCULAR: No chest pain or palpitations  GASTROINTESTINAL: No abdominal or epigastric pain. No nausea, vomiting, or hematemesis; No diarrhea or constipation. No melena or hematochezia.  GENITOURINARY: No dysuria, frequency or hematuria  NEUROLOGICAL: No numbness or weakness  SKIN: No itching, rashes    OBJECTIVE:    VITAL SIGNS:  ICU Vital Signs Last 24 Hrs  T(C): 36.7 (2024 04:00), Max: 36.7 (2024 04:00)  T(F): 98 (2024 04:00), Max: 98 (2024 04:00)  HR: 75 (2024 06:00) (50 - 86)  BP: 99/63 (2024 06:00) (63/47 - 134/74)  BP(mean): 76 (2024 06:00) (39 - 99)  ABP: --  ABP(mean): --  RR: 26 (2024 06:00) (12 - 33)  SpO2: 94% (2024 06:00) (83% - 100%)    O2 Parameters below as of 2024 21:30  Patient On (Oxygen Delivery Method): nasal cannula  O2 Flow (L/min): 2    - @ 07:01  -  -06 @ 07:00  --------------------------------------------------------  IN: 950.3 mL / OUT: 1105 mL / NET: -154.7 mL    CAPILLARY BLOOD GLUCOSE  POCT Blood Glucose.: 93 mg/dL (2024 16:58)    PHYSICAL EXAM:    General: NAD  HEENT: NC/AT; PERRL, clear conjunctiva  Neck: supple  Respiratory: CTA b/l  Cardiovascular: +S1/S2; RRR  Abdomen: Distended, soft, dimple and perit drains in place  Extremities: WWP, 2+ peripheral pulses b/l; 2+ b/l LE edema  Skin: normal color and turgor; no rash  Neurological:    MEDICATIONS:  MEDICATIONS  (STANDING):  aspirin enteric coated 81 milliGRAM(s) Oral daily  fluticasone propionate 50 MICROgram(s)/spray Nasal Spray 1 Spray(s) Both Nostrils two times a day  levothyroxine 50 MICROGram(s) Oral daily  montelukast 10 milliGRAM(s) Oral daily  oxybutynin 10 milliGRAM(s) Oral every 24 hours  phenylephrine    Infusion 0.5 MICROgram(s)/kG/Min (23.8 mL/Hr) IV Continuous <Continuous>    MEDICATIONS  (PRN):  albuterol    90 MICROgram(s) HFA Inhaler 2 Puff(s) Inhalation every 6 hours PRN Shortness of Breath and/or Wheezing    ALLERGIES:  Allergies    latex (Rash)  phenobarbital (Rash)  erythromycin (Rash)    Intolerances    Milk (Rash)      LABS:                        8.2    7.97  )-----------( 298      ( 2024 23:31 )             28.2     07-05    139  |  98  |  34<H>  ----------------------------<  85  3.7   |  31  |  0.43<L>    Ca    8.3<L>      2024 23:31  Phos  3.4     07-05  Mg     1.8     07-05    TPro  5.3<L>  /  Alb  2.7<L>  /  TBili  0.4  /  DBili  x   /  AST  12  /  ALT  7<L>  /  AlkPhos  93  07-05    PT/INR - ( 2024 23:31 )   PT: 13.3 sec;   INR: 1.22 ratio      PTT - ( 2024 18:24 )  PTT:28.0 sec  Urinalysis Basic - ( 2024 00:46 )    Color: Dark Yellow / Appearance: Clear / S.015 / pH: x  Gluc: x / Ketone: Negative mg/dL  / Bili: Negative / Urobili: 0.2 mg/dL   Blood: x / Protein: Negative mg/dL / Nitrite: Negative   Leuk Esterase: Large / RBC: 2 /HPF / WBC 5 /HPF   Sq Epi: x / Non Sq Epi: 7 /HPF / Bacteria: Few /HPF    RADIOLOGY & ADDITIONAL TESTS: Reviewed.

## 2024-07-06 NOTE — OCCUPATIONAL THERAPY INITIAL EVALUATION ADULT - RANGE OF MOTION EXAMINATION, UPPER EXTREMITY
L shoulder limited to 3/4 range due to h/o TSR/bilateral UE Active ROM was WFL  (within functional limits)

## 2024-07-06 NOTE — PROGRESS NOTE ADULT - ASSESSMENT
83 year old woman with COPD not on O2, obesity, hypertension, HFpEF, BCC, hypothyroidism, psoriatic arthritis, SUZIE, metastatic ovarian cancer and perforated gallbladder who presenting for hypotension after paracentesis.    Neuro  No active Issues    CV    #Shock  Hypovolemic 2/2 paracentesis vs anemia less likely septic  POCUS showing collapsed IVC  - hemoglobin decrease from 9.6 to 7.6  - CT Angio to assess for peritoneal bleed  - continue gentle colloid vs blood resuscitation  - holding metoprolol tartrate    #History of HFpEF  ?CAD  - c/w ASA  - c/w pravastatin    Respiratory  #COPD  - Advair  On 4L NC,   7.40/56/133/35 98%     #Asthma  - singulair    Renal  Cr. 0.42 at baseline, UOP nonoliguric    Endocrine    #Hypothyroidism  - synthroid    No active issues    GI  Hx of perforated gall bladder  -maintain dimple drain    Diet    ID  No indication abx    Hematology    #Anemia  Likely blood loss anemia vs dilutional    DVT ppx  -hold chemical ppx while possibly bleeding  -SCDs        Metastatic Ovarian Cancer  - Pain controlled.     GOC    Previous DNR/DNI with trial of intubation??  -will discuss GOC with patient and family         83 year old woman with COPD not on O2, obesity, hypertension, HFpEF, BCC, hypothyroidism, psoriatic arthritis, SUZIE, metastatic ovarian cancer and perforated gallbladder who presenting for hypotension after paracentesis.    Neuro  No active Issues    CV    #Shock  Hypovolemic 2/2 paracentesis vs anemia less likely septic  POCUS showing collapsed IVC  - hemoglobin decrease from 9.6 to 7.6  - CT Angio to assess for peritoneal bleed - no active bleed  - continue gentle colloid vs blood resuscitation  - holding metoprolol tartrate    #History of HFpEF  ?CAD  - c/w ASA  - c/w pravastatin    Respiratory  #COPD  - Advair  On 4L NC,   7.40/56/133/35 98%     #Asthma  - singulair    Renal  #R hydronephrosis  Cr. 0.42 at baseline, UOP nonoliguric  - New R hydronephrosis visualized on CT, possible mass effect    Endocrine    #Hypothyroidism  - synthroid    No active issues    GI  Hx of perforated gall bladder  -maintain dimple drain    Diet    ID  No indication abx    Hematology    #Anemia  Likely blood loss anemia vs dilutional    DVT ppx  -hold chemical ppx while possibly bleeding  -SCDs        Metastatic Ovarian Cancer  - Pain controlled.     GOC    Previous DNR/DNI with trial of intubation??  -will discuss GOC with patient and family

## 2024-07-06 NOTE — PHYSICAL THERAPY INITIAL EVALUATION ADULT - NSPTDISCHREC_GEN_A_CORE
Chronic illnesses with multiple comorbidities & advance age return to [facility] / LTC/Sub-acute Rehab

## 2024-07-07 NOTE — DISCHARGE NOTE PROVIDER - HOSPITAL COURSE
83 year old woman with COPD not on O2, obesity, hypertension, HF, BCC, hypothyroidism, psoriatic arthritis, SUZIE, metastatic ovarian cancer and perforated gallbladder who was transferred to the ED after placement of an abdominal pleurX with IR where ~5L  of fluid was drained. She became hypotensive presenting for abdominal pleurX placement. Patient had 5 L of fluid drained from her abdomen and became hypotensive. Despite 3L of fluids, pt remained hypotensive and was and was transferred to MICU with jasmin ggt and successfully tapered off. On the floors, patient remained stable. Her metoprolol and lasix was held given her soft BPs. On date of discharge, patient is medically cleared and does not complain of any chest pain or SOB. Discussed code status again in detail. MOLST was rescinded for pleurx procedure which led to admission and pressors for shock. Discussed again in detail with pt and son at bedside who chose to sign a new MOLST reinstated DNR/DNI, send to hospital only if pain or severe symptoms cannot me managed. If so, the plan would be to admit to hospice.     Important medication changes  1) Metoprolol and Lasix 20mg was held throughout admission   83 year old woman with COPD not on O2, obesity, hypertension, HF, BCC, hypothyroidism, psoriatic arthritis, SUZIE, metastatic ovarian cancer and perforated gallbladder who was transferred to the ED after placement of an abdominal pleurX with IR where ~5L  of fluid was drained. She became hypotensive presenting for abdominal pleurX placement. Patient had 5 L of fluid drained from her abdomen and became hypotensive. Despite 3L of fluids, pt remained hypotensive and was and was transferred to MICU with jasmin ggt and successfully tapered off. On the floors, patient remained stable. Her metoprolol and lasix was held given her soft BPs. On date of discharge, patient is medically cleared and does not complain of any chest pain or SOB. Discussed code status again in detail. MOLST was rescinded for pleurx procedure which led to admission and pressors for shock. Discussed again in detail with pt and son at bedside who chose to sign a new MOLST reinstated DNR/DNI, send to hospital only if pain or severe symptoms cannot me managed. If so, the plan would be to admit to hospice.     Important medication changes  1) Metoprolol and Lasix 20mg was held throughout admission      Medications to take:  - resume your home lasix 20mg however hold your metoprolol until you follow up with your PCP 83 year old woman with COPD not on O2, obesity, hypertension, HF, BCC, hypothyroidism, psoriatic arthritis, SUZIE, metastatic ovarian cancer and perforated gallbladder who was transferred to the ED after placement of an abdominal pleurX with IR where ~5L  of fluid was drained. She became hypotensive presenting for abdominal pleurX placement. Patient had 5 L of fluid drained from her abdomen and became hypotensive. Despite 3L of fluids, pt remained hypotensive and was and was transferred to MICU with jasmin ggt and successfully tapered off. On the floors, patient remained stable. Her metoprolol and lasix was held given her soft BPs. On date of discharge, patient is medically cleared and does not complain of any chest pain or SOB. Discussed code status again in detail. MOLST was rescinded for pleurx procedure which led to admission and pressors for shock. Discussed again in detail with pt and son at bedside who chose to sign a new MOLST reinstated DNR/DNI, send to hospital only if pain or severe symptoms cannot me managed. If so, the plan would be to admit to hospice.       Important medication changes  1) Metoprolol and Lasix 20mg was held throughout admission      Medications to take:  - continue holding lasix and metoprolol

## 2024-07-07 NOTE — PROGRESS NOTE ADULT - PROBLEM SELECTOR PLAN 1
Hypovolemic 2/2 paracentesis vs anemia less likely septic  POCUS showing collapsed IVC  - hemoglobin decrease from 9.6 to 7.6  - CT Angio to assess for peritoneal bleed - no active bleed  - continue gentle colloid vs blood resuscitation  - holding metoprolol tartrate Hypovolemic 2/2 paracentesis vs anemia less likely septic  POCUS showing collapsed IVC  - hemoglobin 7.6 but labs d/c given hospice status change  - CT Angio to assess for peritoneal bleed - no active bleed  - holding metoprolol tartrate

## 2024-07-07 NOTE — DISCHARGE NOTE PROVIDER - NSDCFUSCHEDAPPT_GEN_ALL_CORE_FT
[facility]  [facility] [address]  Scheduled Appointment: 07/09/2024    [facility]  Scheduled Appointment: 07/22/2024     [facility]  Scheduled Appointment: 07/22/2024

## 2024-07-07 NOTE — DISCHARGE NOTE PROVIDER - CARE PROVIDER_API CALL
Ashlie Langford  Internal Medicine  [phone] [place], [place] [address]  Phone: [phone]  Fax: [phone]  Follow Up Time: 1 week

## 2024-07-07 NOTE — DISCHARGE NOTE PROVIDER - DETAILS OF MALNUTRITION DIAGNOSIS/DIAGNOSES
This patient has been assessed with a concern for Malnutrition and was treated during this hospitalization for the following Nutrition diagnosis/diagnoses:     -  07/08/2024: Moderate protein-calorie malnutrition   -  07/08/2024: Morbid obesity (BMI > 40)

## 2024-07-07 NOTE — PROGRESS NOTE ADULT - ASSESSMENT
83 year old woman with COPD not on O2, obesity, hypertension, HFpEF, BCC, hypothyroidism, psoriatic arthritis, SUZIE, metastatic ovarian cancer and perforated gallbladder who presenting for hypotension after paracentesis.    Neuro  No active Issues    CV    #Shock  Hypovolemic 2/2 paracentesis vs anemia less likely septic  POCUS showing collapsed IVC  - hemoglobin decrease from 9.6 to 7.6  - CT Angio to assess for peritoneal bleed - no active bleed  - continue gentle colloid vs blood resuscitation  - holding metoprolol tartrate    #History of HFpEF  ?CAD  - c/w ASA  - c/w pravastatin    Respiratory  #COPD  - Advair  On 4L NC,   7.40/56/133/35 98%     #Asthma  - singulair    Renal  #R hydronephrosis  Cr. 0.42 at baseline, UOP nonoliguric  - New R hydronephrosis visualized on CT, possible mass effect    Endocrine    #Hypothyroidism  - synthroid    No active issues    GI  Hx of perforated gall bladder  -maintain dimple drain    Diet    ID  No indication abx    Hematology    #Anemia  Likely blood loss anemia vs dilutional    DVT ppx  -hold chemical ppx while possibly bleeding  -SCDs        Metastatic Ovarian Cancer  - Pain controlled.     GOC    Previous DNR/DNI with trial of intubation??  -will discuss GOC with patient and family         83 year old woman with COPD not on O2, obesity, hypertension, HFpEF, BCC, hypothyroidism, psoriatic arthritis, SUZIE, metastatic ovarian cancer and perforated gallbladder who presenting for hypotension after paracentesis. Admitted to MICU for hypovolemic shock and placed on jasmin, now on floors for management of her HRN and HF.   CV

## 2024-07-07 NOTE — DISCHARGE NOTE PROVIDER - NSDCCPTREATMENT_GEN_ALL_CORE_FT
PRINCIPAL PROCEDURE  Procedure: CT abdomen  Findings and Treatment:   FINDINGS:  Evaluation is and very limited secondary to beam hardening artifact due   to the patient's arms down at her sides. The abdominal wallcould not be   fully imaged due to body habitus.  LOWER CHEST: Bilateral pleural effusions with compressive atelectasis.   Coronary artery, aortic valve, and mitral annular calcifications.  LIVER: Small hypodense lesions are unchanged and too small to   characterize on CT..  BILE DUCTS: Normal caliber.  GALLBLADDER: Decompressed around cholecystostomy tube.  SPLEEN: Within normal limits.  PANCREAS: Within normal limits.  ADRENALS: Within normal limits.  KIDNEYS/URETERS: New mild right-sided hydronephrosis. No left-sided   hydronephrosis.  BLADDER: Within normal limits.  REPRODUCTIVE ORGANS: Very poorly visualized due to extensive streak   artifact.  BOWEL: No evidence of active GI bleed. Hyperdensities within the stomach,   likely represent ingested material. No bowel obstruction. Appendix is not   visualized.  PERITONEUM/RETROPERITONEUM: Small amount of ascites; density measurements   are spurious due to streak artifact.. Abdominal Pleurx drain terminates   in the left upper quadrant. Again noted are numerous small peritoneal   implants, calcification and peritoneal thickening.  VESSELS: Atherosclerotic changes.  LYMPH NODES: Bilateral pelvic sidewall lymphadenopathy, similar to prior.  ABDOMINAL WALL: Small fat-containing umbilical hernia. Diffuse anasarca.  BONES: Degenerative changes.  IMPRESSION:  Exam is markedly limited.  Within these limitations no evidence of large hemoperitoneum or active GI   bleed. Small volume ascites; density assessment of the ascitic fluid for   hemorrhage is markedly limited due to artifact.  Moderate bilateral pleural effusions with compressive atelectasis.  Mild right hydronephrosis, new since prior.

## 2024-07-07 NOTE — PROGRESS NOTE ADULT - SUBJECTIVE AND OBJECTIVE BOX
PROGRESS NOTE:   Authored by Dr. Kana Mckeon MD (PGY-1).     Patient is a 83y old  Female who presents with a chief complaint of Hypotension (2024 07:29)      SUBJECTIVE / OVERNIGHT EVENTS:  No acute events overnight.     ADDITIONAL REVIEW OF SYSTEMS:  Patient denies fevers, chills, chest pain, shortness of breath, nausea, abdominal pain, diarrhea, constipation, dysuria, leg swelling, headache, light headedness.    MEDICATIONS  (STANDING):  aspirin enteric coated 81 milliGRAM(s) Oral daily  budesonide 160 MICROgram(s)/formoterol 4.5 MICROgram(s) Inhaler 2 Puff(s) Inhalation two times a day  fluticasone propionate 50 MICROgram(s)/spray Nasal Spray 1 Spray(s) Both Nostrils two times a day  heparin   Injectable 5000 Unit(s) SubCutaneous every 12 hours  levothyroxine 50 MICROGram(s) Oral daily  montelukast 10 milliGRAM(s) Oral daily  nystatin Powder 1 Application(s) Topical two times a day  oxybutynin 10 milliGRAM(s) Oral every 24 hours    MEDICATIONS  (PRN):  albuterol    90 MICROgram(s) HFA Inhaler 2 Puff(s) Inhalation every 6 hours PRN Shortness of Breath and/or Wheezing      CAPILLARY BLOOD GLUCOSE        I&O's Summary    2024 07:01  -  2024 07:00  --------------------------------------------------------  IN: 390 mL / OUT: 200 mL / NET: 190 mL        PHYSICAL EXAM:  Vital Signs Last 24 Hrs  T(C): 36.6 (2024 05:37), Max: 36.9 (2024 12:00)  T(F): 97.8 (2024 05:37), Max: 98.4 (2024 12:00)  HR: 103 (2024 05:37) (75 - 103)  BP: 102/47 (2024 05:37) (102/47 - 174/77)  BP(mean): 76 (2024 23:00) (66 - 95)  RR: 18 (2024 05:37) (15 - 31)  SpO2: 99% (2024 05:37) (94% - 100%)    Parameters below as of 2024 05:37  Patient On (Oxygen Delivery Method): nasal cannula        CONSTITUTIONAL: NAD, well-developed  RESPIRATORY: Normal respiratory effort; lungs are clear to auscultation bilaterally  CARDIOVASCULAR: Regular rate and rhythm, normal S1 and S2, no murmur/rub/gallop; No lower extremity edema; Peripheral pulses are 2+ bilaterally  ABDOMEN: Nontender to palpation, normoactive bowel sounds, no rebound/guarding; No hepatosplenomegaly  MSK: no clubbing or cyanosis of digits; no joint swelling or tenderness to palpation  PSYCH: A+O to person, place, and time; affect appropriate    LABS:                        8.2    7.97  )-----------( 298      ( 2024 23:31 )             28.2     07-05    139  |  98  |  34<H>  ----------------------------<  85  3.7   |  31  |  0.43<L>    Ca    8.3<L>      2024 23:31  Phos  3.4     07-05  Mg     1.8     07-05    TPro  5.3<L>  /  Alb  2.7<L>  /  TBili  0.4  /  DBili  x   /  AST  12  /  ALT  7<L>  /  AlkPhos  93  07-05    PT/INR - ( 2024 23:31 )   PT: 13.3 sec;   INR: 1.22 ratio         PTT - ( 2024 18:24 )  PTT:28.0 sec      Urinalysis Basic - ( 2024 00:46 )    Color: Dark Yellow / Appearance: Clear / S.015 / pH: x  Gluc: x / Ketone: Negative mg/dL  / Bili: Negative / Urobili: 0.2 mg/dL   Blood: x / Protein: Negative mg/dL / Nitrite: Negative   Leuk Esterase: Large / RBC: 2 /HPF / WBC 5 /HPF   Sq Epi: x / Non Sq Epi: 7 /HPF / Bacteria: Few /HPF        Culture - Blood (collected 2024 00:48)  Source: .Blood Blood-Peripheral  Preliminary Report (2024 06:02):    No growth at 24 hours    Culture - Blood (collected 2024 00:48)  Source: .Blood Blood-Peripheral  Preliminary Report (2024 06:02):    No growth at 24 hours        Tele Reviewed:    RADIOLOGY & ADDITIONAL TESTS:  Results Reviewed:   Imaging Personally Reviewed:  Electrocardiogram Personally Reviewed:     PROGRESS NOTE:   Authored by Dr. Kana Mckeon MD (PGY-1).     Patient is a 83y old  Female who presents with a chief complaint of Hypotension (2024 07:29)      SUBJECTIVE / OVERNIGHT EVENTS:  No acute events overnight. She reports no chest pain and dyspnea.    ADDITIONAL REVIEW OF SYSTEMS:  Patient denies fevers, chills, chest pain, shortness of breath, nausea, abdominal pain, diarrhea, constipation, dysuria, leg swelling, headache, light headedness.    MEDICATIONS  (STANDING):  aspirin enteric coated 81 milliGRAM(s) Oral daily  budesonide 160 MICROgram(s)/formoterol 4.5 MICROgram(s) Inhaler 2 Puff(s) Inhalation two times a day  fluticasone propionate 50 MICROgram(s)/spray Nasal Spray 1 Spray(s) Both Nostrils two times a day  heparin   Injectable 5000 Unit(s) SubCutaneous every 12 hours  levothyroxine 50 MICROGram(s) Oral daily  montelukast 10 milliGRAM(s) Oral daily  nystatin Powder 1 Application(s) Topical two times a day  oxybutynin 10 milliGRAM(s) Oral every 24 hours    MEDICATIONS  (PRN):  albuterol    90 MICROgram(s) HFA Inhaler 2 Puff(s) Inhalation every 6 hours PRN Shortness of Breath and/or Wheezing      CAPILLARY BLOOD GLUCOSE        I&O's Summary    2024 07:01  -  2024 07:00  --------------------------------------------------------  IN: 390 mL / OUT: 200 mL / NET: 190 mL        PHYSICAL EXAM:  Vital Signs Last 24 Hrs  T(C): 36.6 (2024 05:37), Max: 36.9 (2024 12:00)  T(F): 97.8 (2024 05:37), Max: 98.4 (2024 12:00)  HR: 103 (2024 05:37) (75 - 103)  BP: 102/47 (2024 05:37) (102/47 - 174/77)  BP(mean): 76 (2024 23:00) (66 - 95)  RR: 18 (2024 05:37) (15 - 31)  SpO2: 99% (2024 05:37) (94% - 100%)    Parameters below as of 2024 05:37  Patient On (Oxygen Delivery Method): nasal cannula        CONSTITUTIONAL: NAD, well-developed  RESPIRATORY: Normal respiratory effort; lungs are clear to auscultation bilaterally  CARDIOVASCULAR: Regular rate and rhythm, normal S1 and S2, no murmur/rub/gallop; No lower extremity edema; Peripheral pulses are 2+ bilaterally  ABDOMEN: +dimple tube, distended Nontender to palpation, normoactive bowel sounds, no rebound/guarding; No hepatosplenomegaly  MSK: no clubbing or cyanosis of digits; no joint swelling or tenderness to palpation  PSYCH: A+O to person, place, and time; affect appropriate    LABS:                        8.2    7.97  )-----------( 298      ( 2024 23:31 )             28.2     07-05    139  |  98  |  34<H>  ----------------------------<  85  3.7   |  31  |  0.43<L>    Ca    8.3<L>      2024 23:31  Phos  3.4     07-05  Mg     1.8     07-05    TPro  5.3<L>  /  Alb  2.7<L>  /  TBili  0.4  /  DBili  x   /  AST  12  /  ALT  7<L>  /  AlkPhos  93  07-05    PT/INR - ( 2024 23:31 )   PT: 13.3 sec;   INR: 1.22 ratio         PTT - ( 2024 18:24 )  PTT:28.0 sec      Urinalysis Basic - ( 2024 00:46 )    Color: Dark Yellow / Appearance: Clear / S.015 / pH: x  Gluc: x / Ketone: Negative mg/dL  / Bili: Negative / Urobili: 0.2 mg/dL   Blood: x / Protein: Negative mg/dL / Nitrite: Negative   Leuk Esterase: Large / RBC: 2 /HPF / WBC 5 /HPF   Sq Epi: x / Non Sq Epi: 7 /HPF / Bacteria: Few /HPF        Culture - Blood (collected 2024 00:48)  Source: .Blood Blood-Peripheral  Preliminary Report (2024 06:02):    No growth at 24 hours    Culture - Blood (collected 2024 00:48)  Source: .Blood Blood-Peripheral  Preliminary Report (2024 06:02):    No growth at 24 hours        Tele Reviewed:    RADIOLOGY & ADDITIONAL TESTS:  Results Reviewed:   Imaging Personally Reviewed:  Electrocardiogram Personally Reviewed:     PROGRESS NOTE:   Authored by Dr. Kana Mckeon MD (PGY-1).     Patient is a 83y old  Female who presents with a chief complaint of Hypotension (2024 07:29)      SUBJECTIVE / OVERNIGHT EVENTS:  No acute events overnight. She reports no chest pain and dyspnea.    ADDITIONAL REVIEW OF SYSTEMS:  Patient denies fevers, chills, chest pain, shortness of breath, nausea, abdominal pain, diarrhea, constipation, dysuria, leg swelling, headache, light headedness.    MEDICATIONS  (STANDING):  aspirin enteric coated 81 milliGRAM(s) Oral daily  budesonide 160 MICROgram(s)/formoterol 4.5 MICROgram(s) Inhaler 2 Puff(s) Inhalation two times a day  fluticasone propionate 50 MICROgram(s)/spray Nasal Spray 1 Spray(s) Both Nostrils two times a day  heparin   Injectable 5000 Unit(s) SubCutaneous every 12 hours  levothyroxine 50 MICROGram(s) Oral daily  montelukast 10 milliGRAM(s) Oral daily  nystatin Powder 1 Application(s) Topical two times a day  oxybutynin 10 milliGRAM(s) Oral every 24 hours    MEDICATIONS  (PRN):  albuterol    90 MICROgram(s) HFA Inhaler 2 Puff(s) Inhalation every 6 hours PRN Shortness of Breath and/or Wheezing      CAPILLARY BLOOD GLUCOSE        I&O's Summary    2024 07:01  -  2024 07:00  --------------------------------------------------------  IN: 390 mL / OUT: 200 mL / NET: 190 mL        PHYSICAL EXAM:  Vital Signs Last 24 Hrs  T(C): 36.6 (2024 05:37), Max: 36.9 (2024 12:00)  T(F): 97.8 (2024 05:37), Max: 98.4 (2024 12:00)  HR: 103 (2024 05:37) (75 - 103)  BP: 102/47 (2024 05:37) (102/47 - 174/77)  BP(mean): 76 (2024 23:00) (66 - 95)  RR: 18 (2024 05:37) (15 - 31)  SpO2: 99% (2024 05:37) (94% - 100%)    Parameters below as of 2024 05:37  Patient On (Oxygen Delivery Method): nasal cannula        CONSTITUTIONAL: NAD, well-developed  RESPIRATORY: Normal respiratory effort; lungs are clear to auscultation bilaterally  CARDIOVASCULAR: 2+ pitting edema of lower extremities, Regular rate and rhythm, normal S1 and S2, no murmur/rub/gallop; ; Peripheral pulses are 2+ bilaterally  ABDOMEN: +dimple tube, distended Nontender to palpation, normoactive bowel sounds, no rebound/guarding; No hepatosplenomegaly  MSK: no clubbing or cyanosis of digits; no joint swelling or tenderness to palpation  PSYCH: A+O to person, place, and time; affect appropriate    LABS:                        8.2    7.97  )-----------( 298      ( 2024 23:31 )             28.2     07-05    139  |  98  |  34<H>  ----------------------------<  85  3.7   |  31  |  0.43<L>    Ca    8.3<L>      2024 23:31  Phos  3.4     07-05  Mg     1.8     07-05    TPro  5.3<L>  /  Alb  2.7<L>  /  TBili  0.4  /  DBili  x   /  AST  12  /  ALT  7<L>  /  AlkPhos  93  07-05    PT/INR - ( 2024 23:31 )   PT: 13.3 sec;   INR: 1.22 ratio         PTT - ( 2024 18:24 )  PTT:28.0 sec      Urinalysis Basic - ( 2024 00:46 )    Color: Dark Yellow / Appearance: Clear / S.015 / pH: x  Gluc: x / Ketone: Negative mg/dL  / Bili: Negative / Urobili: 0.2 mg/dL   Blood: x / Protein: Negative mg/dL / Nitrite: Negative   Leuk Esterase: Large / RBC: 2 /HPF / WBC 5 /HPF   Sq Epi: x / Non Sq Epi: 7 /HPF / Bacteria: Few /HPF        Culture - Blood (collected 2024 00:48)  Source: .Blood Blood-Peripheral  Preliminary Report (2024 06:02):    No growth at 24 hours    Culture - Blood (collected 2024 00:48)  Source: .Blood Blood-Peripheral  Preliminary Report (2024 06:02):    No growth at 24 hours        Tele Reviewed:    RADIOLOGY & ADDITIONAL TESTS:  Results Reviewed:   Imaging Personally Reviewed:  Electrocardiogram Personally Reviewed:

## 2024-07-07 NOTE — DISCHARGE NOTE PROVIDER - NSDCCPCAREPLAN_GEN_ALL_CORE_FT
PRINCIPAL DISCHARGE DIAGNOSIS  Diagnosis: Hypovolemic shock  Assessment and Plan of Treatment: You were admited after your procedure of PleurX placement because your blood pressure was very low. In the ICU, you were in a medication drip to help bring your blood pressure back up and once your BPs were stable, you were brought to the floors. Becase your BP is on the soft side, please do not take your metoprolol until following up with your PCP.      SECONDARY DISCHARGE DIAGNOSES  Diagnosis: Chronic heart failure with preserved ejection fraction (HFpEF)  Assessment and Plan of Treatment: During your admission to the hospital, we held your Lasix because you were hypotensive. Upon discharge, please continue to take your Lasix 20mg daily.     PRINCIPAL DISCHARGE DIAGNOSIS  Diagnosis: Hypovolemic shock  Assessment and Plan of Treatment: You were admited after your procedure of PleurX placement because your blood pressure was very low. In the ICU, you were in a medication drip to help bring your blood pressure back up and once your BPs were stable, you were brought to the floors. Becase your BP is on the soft side, please do not take your metoprolol or lasix until following up with your PCP at the rehab center.   You have an abdominal pleurx placed with IR on 7/5, please have nurses at your facility drain your pleurex up to 1 L daily.      SECONDARY DISCHARGE DIAGNOSES  Diagnosis: Chronic heart failure with preserved ejection fraction (HFpEF)  Assessment and Plan of Treatment: During your admission to the hospital, we held your Lasix because you were hypotensive. Upon discharge, please hold your lasix and metoprolol given your recent hypotensive status.  Follow up with your primary care doctor at the rehab center.     PRINCIPAL DISCHARGE DIAGNOSIS  Diagnosis: Hypovolemic shock  Assessment and Plan of Treatment: You were admited after your procedure of PleurX placement because your blood pressure was very low. In the ICU, you were in a medication drip to help bring your blood pressure back up and once your BPs were stable, you were brought to the floors. Becase your BP is on the soft side, please do not take your metoprolol or lasix until following up with your PCP at the rehab center.   You have an abdominal pleurx placed with IR on 7/5, please have nurses at your facility drain your pleurex up to 1 L daily. No flushes.      SECONDARY DISCHARGE DIAGNOSES  Diagnosis: Chronic heart failure with preserved ejection fraction (HFpEF)  Assessment and Plan of Treatment: During your admission to the hospital, we held your Lasix because you were hypotensive. Upon discharge, please hold your lasix and metoprolol given your recent hypotensive status.  Follow up with your primary care doctor at the rehab center.

## 2024-07-07 NOTE — DISCHARGE NOTE PROVIDER - NSDCMRMEDTOKEN_GEN_ALL_CORE_FT
Advair Diskus 250 mcg-50 mcg inhalation powder: 1 puff(s) inhaled 2 times a day  Allegra 180 mg oral tablet: 1 tab(s) orally once a day  ammonium lactate 12% topical lotion: Apply topically to affected area once a day  Aspirin Enteric Coated 81 mg oral delayed release tablet: 1 tab(s) orally once a day  [place] Saline Nasal Mist nasal spray: 1 spray(s) nasal 4 times a day  Biofreeze 10% topical cream: Apply topically to affected area 2 times a day apply to affected area  diclofenac 1% topical gel: Apply topically to affected area 2 times a day apply to affected areas  famotidine 20 mg oral tablet: 1 tab(s) orally 2 times a day  Flonase 50 mcg/inh nasal spray: 2 spray(s) nasal once a day  furosemide 20 mg oral tablet: 1 tab(s) orally once a day  gabapentin 400 mg oral tablet: orally 3 times a day  ipratropium-albuterol 0.5 mg-2.5 mg/3 mL inhalation solution: 3 milliliter(s) by nebulizer every 6 hours  metoprolol tartrate 25 mg oral tablet: 0.5 tab(s) orally every 12 hours  nystatin 100,000 units/g topical cream: Apply topically to affected area 2 times a day  oxyBUTYnin 10 mg/24 hr oral tablet, extended release: 1 tab(s) orally once a day  saline nasal mis 0.65% spray areosol spray: 4 times a day  senna leaf extract oral tablet: 2 tab(s) orally once a day (at bedtime)  Singulair 10 mg oral tablet: 1 tab(s) orally once a day  sulfaSALAzine 500 mg oral delayed release tablet: 2 tab(s) orally every 12 hours  Synthroid 50 mcg (0.05 mg) oral tablet: 1 tab(s) orally once a day   Advair Diskus 250 mcg-50 mcg inhalation powder: 1 puff(s) inhaled 2 times a day  Allegra 180 mg oral tablet: 1 tab(s) orally once a day  ammonium lactate 12% topical lotion: Apply topically to affected area once a day  Aspirin Enteric Coated 81 mg oral delayed release tablet: 1 tab(s) orally once a day  [place] Saline Nasal Mist nasal spray: 1 spray(s) nasal 4 times a day  Biofreeze 10% topical cream: Apply topically to affected area 2 times a day apply to affected area  diclofenac 1% topical gel: Apply topically to affected area 2 times a day apply to affected areas  famotidine 20 mg oral tablet: 1 tab(s) orally 2 times a day  Flonase 50 mcg/inh nasal spray: 2 spray(s) nasal once a day  furosemide 20 mg oral tablet: 1 tab(s) orally once a day  gabapentin 400 mg oral tablet: orally 3 times a day  ipratropium-albuterol 0.5 mg-2.5 mg/3 mL inhalation solution: 3 milliliter(s) by nebulizer every 6 hours  nystatin 100,000 units/g topical cream: Apply topically to affected area 2 times a day  oxyBUTYnin 10 mg/24 hr oral tablet, extended release: 1 tab(s) orally once a day  polyethylene glycol 3350 oral powder for reconstitution: 17 gram(s) orally once a day  saline nasal mis 0.65% spray areosol spray: 4 times a day  senna leaf extract oral tablet: 2 tab(s) orally once a day (at bedtime)  Singulair 10 mg oral tablet: 1 tab(s) orally once a day  sulfaSALAzine 500 mg oral delayed release tablet: 2 tab(s) orally every 12 hours  Synthroid 50 mcg (0.05 mg) oral tablet: 1 tab(s) orally once a day   Advair Diskus 250 mcg-50 mcg inhalation powder: 1 puff(s) inhaled 2 times a day  Allegra 180 mg oral tablet: 1 tab(s) orally once a day  ammonium lactate 12% topical lotion: Apply topically to affected area once a day  Aspirin Enteric Coated 81 mg oral delayed release tablet: 1 tab(s) orally once a day  [place] Saline Nasal Mist nasal spray: 1 spray(s) nasal 4 times a day  Biofreeze 10% topical cream: Apply topically to affected area 2 times a day apply to affected area  diclofenac 1% topical gel: Apply topically to affected area 2 times a day apply to affected areas  famotidine 20 mg oral tablet: 1 tab(s) orally 2 times a day  Flonase 50 mcg/inh nasal spray: 2 spray(s) nasal once a day  gabapentin 400 mg oral tablet: orally 3 times a day  ipratropium-albuterol 0.5 mg-2.5 mg/3 mL inhalation solution: 3 milliliter(s) by nebulizer every 6 hours  nystatin 100,000 units/g topical cream: Apply topically to affected area 2 times a day  oxyBUTYnin 10 mg/24 hr oral tablet, extended release: 1 tab(s) orally once a day  polyethylene glycol 3350 oral powder for reconstitution: 17 gram(s) orally once a day  saline nasal mis 0.65% spray areosol spray: 4 times a day  senna leaf extract oral tablet: 2 tab(s) orally once a day (at bedtime)  Singulair 10 mg oral tablet: 1 tab(s) orally once a day  sulfaSALAzine 500 mg oral delayed release tablet: 2 tab(s) orally every 12 hours  Synthroid 50 mcg (0.05 mg) oral tablet: 1 tab(s) orally once a day

## 2024-07-08 NOTE — PROGRESS NOTE ADULT - PROBLEM SELECTOR PLAN 1
Hypovolemic 2/2 paracentesis vs anemia less likely septic  POCUS showing collapsed IVC  - hemoglobin 7.6 but labs d/c given hospice status change  - CT Angio to assess for peritoneal bleed - no active bleed  - holding metoprolol tartrate

## 2024-07-08 NOTE — DIETITIAN INITIAL EVALUATION ADULT - NSFNSNUTRCHEWSWALLOWFT_GEN_A_CORE
Pt reported no chewing or swallowing difficulties at this time. Pt endorsed dry mouth, RD to add lemon ices and wedges to meal trays. Defer diet texture/consistency to Medical Team.

## 2024-07-08 NOTE — DIETITIAN INITIAL EVALUATION ADULT - PERTINENT MEDS FT
MEDICATIONS  (STANDING):  aspirin enteric coated 81 milliGRAM(s) Oral daily  budesonide 160 MICROgram(s)/formoterol 4.5 MICROgram(s) Inhaler 2 Puff(s) Inhalation two times a day  fluticasone propionate 50 MICROgram(s)/spray Nasal Spray 1 Spray(s) Both Nostrils two times a day  heparin   Injectable 5000 Unit(s) SubCutaneous every 12 hours  levothyroxine 50 MICROGram(s) Oral daily  montelukast 10 milliGRAM(s) Oral daily  nystatin Powder 1 Application(s) Topical two times a day  oxybutynin 10 milliGRAM(s) Oral every 24 hours  polyethylene glycol 3350 17 Gram(s) Oral daily    MEDICATIONS  (PRN):  albuterol    90 MICROgram(s) HFA Inhaler 2 Puff(s) Inhalation every 6 hours PRN Shortness of Breath and/or Wheezing  ondansetron    Tablet 4 milliGRAM(s) Oral every 8 hours PRN Nausea and/or Vomiting

## 2024-07-08 NOTE — DIETITIAN INITIAL EVALUATION ADULT - PERSON TAUGHT/METHOD
Limited education given, as pt declined full RD interview at this time. Educated on the importance of meeting adequate protein-energy needs. Encouraged consumption of HBV foods and prioritizing protein foods first at meal times for wound healing and preservation of muscle mass. Encouraged consumption of oral nutrition supplement to optimize protein-energy intake. Encouraged small, frequent meals. Emphasized importance of consuming nutrient dense foods and snacks to optimize energy and protein intake. Reviewed low sodium nutrition therapy. Obtained food preferences, will honor as able. Pt and son aware RD remains available./verbal instruction/patient instructed/son instructed

## 2024-07-08 NOTE — DIETITIAN INITIAL EVALUATION ADULT - ENERGY INTAKE
Poor (<50%) Currently in-house, pt reported poor appetite and PO intake. Endorsed nausea. Per flow sheets, pt noted to be consuming 51-75% of meals? Question accuracy. Per son at bedside, pt with poor intake here. Pt amenable to trialing [employer] Standard (Vanilla) Oral Nutrition Supplement 2x/day and ProSource Gelatein (Pineapple) 1x/day in-house to optimize protein-energy intake. Pt endorsed dry mouth, RD to send lemon ice and lemon wedges with meals.

## 2024-07-08 NOTE — DIETITIAN INITIAL EVALUATION ADULT - SIGNS/SYMPTOMS
as evidenced by pt meeting <75% EER > 1 month, mild/moderate edema.  as evidenced by pt with BMI >40.

## 2024-07-08 NOTE — PROGRESS NOTE ADULT - ASSESSMENT
83 year old woman with COPD not on O2, obesity, hypertension, HFpEF, BCC, hypothyroidism, psoriatic arthritis, SUZIE, metastatic ovarian cancer and perforated gallbladder who presenting for hypotension after paracentesis. Admitted to MICU for hypovolemic shock and placed on jasmin, now on floors for management of her HRN and HF.   CV

## 2024-07-08 NOTE — DIETITIAN INITIAL EVALUATION ADULT - OTHER INFO
Weights:  - UBW (per patient): 245 pounds (2 months ago) --> ~280 (currently) (endorsed weight gain due to fluid)  - Dosing Weight (per chart): 279.9 pounds (7/5)  - Daily Weights (per flow sheets): 274.4 pounds (7/7)(USED FOR BMI), 275.1 pounds (7/6)  - Per [facility] HIE: 280 pounds (7/6/24), 275.8 pounds (7/3/24), 280 pounds (6/13/24), 245.10 pounds (5/4/24), 296 pounds (12/21/22)  Pt reported recent weight gain since 5/2024 in setting of fluid shifts. Per HIE, pt with possible ~29 pound weight gain x 2 months. Pt also noted with 2+ and 1+ edema in-house. RD to continue to monitor weights and trends as able.     Nutritional Concerns:  - Pt with hypovolemic shock (per chart), "2/2 paracentesis vs anemia." Pt is s/p Abdominal Pleurx Catheter Placement on 7/5 with 5 L fluid removed (per chart).   - Pt with chronic heart failure with preserved ejection fraction (per chart).  - Pt with ovarian carcinoma (per chart).   - Per chart, d/c planning back to [place] with Hospice.   - Ordered for Synthroid in-house (per orders).

## 2024-07-08 NOTE — DIETITIAN INITIAL EVALUATION ADULT - NSFNSGIIOFT_GEN_A_CORE
Pt endorsed nausea at this time. Ordered for Zofran (per orders).  No GI distress noted in flow sheets. Last BM 7/8 (per flow sheets). Ordered for Miralax in-house (per orders).   Continue to monitor bowel movements and bowel movement regularity. Adjust bowel regimen as needed.

## 2024-07-08 NOTE — DIETITIAN INITIAL EVALUATION ADULT - ORAL INTAKE PTA/DIET HISTORY
Nutrition assessment completed at bedside. Pt's son present at bedside, provided additional subjective information.  Nutrition assessment completed at bedside. Pt's son present at bedside, provided additional subjective information. Limited subjective information obtained, pt stated she was not feeling up to a full interview at this time. RD to honor pt preference. Pt reported poor appetite/PO intake since May 2024, endorsed nausea and early satiety due to fluid. Pt reported UBW of ~245 pounds (2 months ago), endorsed current body weight of ~280 pounds, endorsed weight gain secondary to fluid shifts. Confirmed lactose intolerance, stated she is able to consume some products with dairy in moderate amounts. No other food allergies reported.

## 2024-07-08 NOTE — DIETITIAN INITIAL EVALUATION ADULT - OTHER CALCULATIONS
Estimated protein-energy needs calculated using upper IBW with consideration for BMI >40, ovarian cancer, pressure injuries.   Defer fluid calculations to the medical team.

## 2024-07-08 NOTE — DIETITIAN INITIAL EVALUATION ADULT - ETIOLOGY
related to suspected energy intake exceeding estimated energy expenditure  related to inability to meet sufficient protein-energy needs in setting of nausea, early satiety, increased metabolic demand

## 2024-07-08 NOTE — DIETITIAN INITIAL EVALUATION ADULT - REASON INDICATOR FOR ASSESSMENT
RD consult warranted for Pressure Injury 2 or >, MST Score 2 or >  Source: Patient, Patient's Son at Bedside ([place]), Electronic Medical Record  Chart reviewed, events noted.  RD consult warranted for Pressure Injury 2 or >, MST Score 2 or >  Source: Patient, Patient's Son at Bedside ([place]), Previous RD Note, Paper Chart, Electronic Medical Record  Chart reviewed, events noted.

## 2024-07-08 NOTE — DIETITIAN INITIAL EVALUATION ADULT - ADD RECOMMEND
1) Recommend Low Sodium diet as tolerated. Defer diet texture/consistency to Medical Team.   2) Recommend adding [employer] Standard Oral Supplement 2x/day and ProSource Gelatein 1x/day to optimize protein-energy intake.  3) Recommend adding Multivitamin and Vitamin C daily for micronutrient coverage and wound healing unless contraindicated.   4) Monitor and encourage adequate PO intake, RD obtained food preferences to optimize PO intake, will honor as able.   5) Monitor electrolytes, replete as needed.   6) Monitor nutritional intake, tolerance to diet prescription, bowel movements, weights, labs, and skin integrity.  7) Malnutrition sticker and BMI alert placed in chart.

## 2024-07-08 NOTE — PROGRESS NOTE ADULT - SUBJECTIVE AND OBJECTIVE BOX
PROGRESS NOTE:   Authored by Dr. Kana Mckeon MD (PGY-1).     Patient is a 83y old  Female who presents with a chief complaint of Hypotension (07 Jul 2024 18:21)      SUBJECTIVE / OVERNIGHT EVENTS:  No acute events overnight.     ADDITIONAL REVIEW OF SYSTEMS:  Patient denies fevers, chills, chest pain, shortness of breath, nausea, abdominal pain, diarrhea, constipation, dysuria, leg swelling, headache, light headedness.    MEDICATIONS  (STANDING):  aspirin enteric coated 81 milliGRAM(s) Oral daily  budesonide 160 MICROgram(s)/formoterol 4.5 MICROgram(s) Inhaler 2 Puff(s) Inhalation two times a day  fluticasone propionate 50 MICROgram(s)/spray Nasal Spray 1 Spray(s) Both Nostrils two times a day  heparin   Injectable 5000 Unit(s) SubCutaneous every 12 hours  levothyroxine 50 MICROGram(s) Oral daily  montelukast 10 milliGRAM(s) Oral daily  nystatin Powder 1 Application(s) Topical two times a day  oxybutynin 10 milliGRAM(s) Oral every 24 hours  polyethylene glycol 3350 17 Gram(s) Oral daily    MEDICATIONS  (PRN):  albuterol    90 MICROgram(s) HFA Inhaler 2 Puff(s) Inhalation every 6 hours PRN Shortness of Breath and/or Wheezing  ondansetron    Tablet 4 milliGRAM(s) Oral every 8 hours PRN Nausea and/or Vomiting      CAPILLARY BLOOD GLUCOSE        I&O's Summary    07 Jul 2024 07:01  -  08 Jul 2024 07:00  --------------------------------------------------------  IN: 0 mL / OUT: 50 mL / NET: -50 mL        PHYSICAL EXAM:  Vital Signs Last 24 Hrs  T(C): 36.4 (08 Jul 2024 06:00), Max: 36.8 (07 Jul 2024 21:00)  T(F): 97.5 (08 Jul 2024 06:00), Max: 98.2 (07 Jul 2024 21:00)  HR: 63 (08 Jul 2024 06:00) (62 - 126)  BP: 108/54 (08 Jul 2024 06:00) (97/58 - 113/62)  BP(mean): --  RR: 18 (08 Jul 2024 06:00) (18 - 20)  SpO2: 100% (08 Jul 2024 06:00) (90% - 100%)    Parameters below as of 08 Jul 2024 06:00  Patient On (Oxygen Delivery Method): room air        CONSTITUTIONAL: NAD, well-developed  RESPIRATORY: Normal respiratory effort; lungs are clear to auscultation bilaterally  CARDIOVASCULAR: Regular rate and rhythm, normal S1 and S2, no murmur/rub/gallop; No lower extremity edema; Peripheral pulses are 2+ bilaterally  ABDOMEN: Nontender to palpation, normoactive bowel sounds, no rebound/guarding; No hepatosplenomegaly  MSK: no clubbing or cyanosis of digits; no joint swelling or tenderness to palpation  PSYCH: A+O to person, place, and time; affect appropriate    LABS:                    Culture - Blood (collected 06 Jul 2024 00:48)  Source: .Blood Blood-Peripheral  Preliminary Report (08 Jul 2024 05:01):    No growth at 48 Hours    Culture - Blood (collected 06 Jul 2024 00:48)  Source: .Blood Blood-Peripheral  Preliminary Report (08 Jul 2024 05:01):    No growth at 48 Hours        Tele Reviewed:    RADIOLOGY & ADDITIONAL TESTS:  Results Reviewed:   Imaging Personally Reviewed:  Electrocardiogram Personally Reviewed:

## 2024-07-08 NOTE — DIETITIAN INITIAL EVALUATION ADULT - NSFNSADHERENCEPTAFT_GEN_A_CORE
Limited information able to be obtained regarding diet prior to admission. Limited information available in paper chart. Per previous RD note on 5/2/24: " Per [facility] paper chart, pt on Low fat/cholesterol, No salt added, No concentrated sweets diet."     Pt reported poor appetite and PO intake since May 2024 in setting of fluid, nausea and early satiety.

## 2024-07-08 NOTE — DIETITIAN INITIAL EVALUATION ADULT - REASON
Nutrition focused physical exam deferred at this time, as pt declining full RD interview. RD to perform nutrition focused physical exam at a later date if medically appropriate/feasible.

## 2024-07-08 NOTE — DIETITIAN INITIAL EVALUATION ADULT - NSFNSPHYEXAMSKINFT_GEN_A_CORE
Pressure Injury 1 - Bilateral:  sacrum, Suspected Deep Tissue Injury  Pressure Injury 2 - Left:; abdomen, Stage II  Pressure Injury 3 - Left:; flank area, Stage II  (per nursing flow sheets)

## 2024-07-09 ENCOUNTER — APPOINTMENT (OUTPATIENT)
Dept: GASTROENTEROLOGY | Facility: CLINIC | Age: 84
End: 2024-07-09

## 2024-07-09 ENCOUNTER — TRANSCRIPTION ENCOUNTER (OUTPATIENT)
Age: 84
End: 2024-07-09

## 2024-07-09 NOTE — DISCHARGE NOTE NURSING/CASE MANAGEMENT/SOCIAL WORK - PATIENT PORTAL LINK FT
You can access the FollowMyHealth Patient Portal offered by [facility] by registering at the following website: [email or web address]. By joining [employer]’s FollowMyHealth portal, you will also be able to view your health information using other applications (apps) compatible with our system.

## 2024-07-09 NOTE — PROGRESS NOTE ADULT - NUTRITIONAL ASSESSMENT
This patient has been assessed with a concern for Malnutrition and has been determined to have a diagnosis/diagnoses of Moderate protein-calorie malnutrition and Morbid obesity (BMI > 40).    This patient is being managed with:   Diet Regular-  Entered: Jul 5 2024  8:35PM

## 2024-07-09 NOTE — PROGRESS NOTE ADULT - ATTENDING COMMENTS
84 yo  female with metastatic ovarian cancer s/p 5 liter paracentesis yesterday for malignant ascites. Pt became hypotensive  after procedure. Despite 3 liters IVF pt remained hypotensive and placed on pressors. Overnight pt had  CTA abdomen that revealed no bowel perforation or bleeding. Pt tapered off pressors last night. HB dropped 2 grams. No evidence of bleeding. Continue IV fluids. Pocus with small IVC. Hold BP medication for now. Follow H/H. DVT prophylaxis SCD.  GOC. DNR.
83 year old woman with COPD not on O2, obesity, hypertension, HFpEF, BCC, hypothyroidism, psoriatic arthritis, SUZIE, metastatic ovarian cancer and perforated gallbladder who presenting for hypotension after paracentesis. Admitted to MICU for hypovolemic shock and placed on jasmin, now on floors for management of her HRN and HF.     Admitted for Hypovolemic Shock due to high output from abdominal pleurX   hold all antihypertensives  Nausea likely from Constipation, resume home laxatives, zofran PRN, check QTc  Previous provider discussed code status again in detail. MOLST was rescinded for procedure which led to admission and pressors for shock. Discussed again in detail with pt and son at bedside who chose to sign a new MOLST reinstated DNR/DNI, send to hospital only if pain or severe symptoms cannot me managed. If so, the plan would be to admit to hospice.   DC planning back to [place] with Hospice    Rest of care per plan above  D/W HS4
83 year old woman with COPD not on O2, obesity, hypertension, HFpEF, BCC, hypothyroidism, psoriatic arthritis, SUZIE, metastatic ovarian cancer and perforated gallbladder who presenting for hypotension after paracentesis. Admitted to MICU for hypovolemic shock and placed on jasmin, now on floors for management of her HRN and HF.     Admitted for Hypovolemic Shock due to high output from abdominal pleurX   hold all antihypertensives  Nausea likely from Constipation, resume home laxatives, zofran PRN, check QTc  Previous provider discussed code status again in detail. MOLST was rescinded for procedure which led to admission and pressors for shock. Discussed again in detail with pt and son at bedside who chose to sign a new MOLST reinstated DNR/DNI, send to hospital only if pain or severe symptoms cannot me managed. If so, the plan would be to admit to hospice.   Discussed with IR, recommend PleurX drain up to 1L daily. Continue to hold lasix and metoprolol on discharge in setting of low BP and PleurX.   Plan to discharge back to [place] with Hospice.    D/w resident team 4
83 year old woman with COPD not on O2, obesity, hypertension, HFpEF, BCC, hypothyroidism, psoriatic arthritis, SUZIE, metastatic ovarian cancer and perforated gallbladder who presenting for hypotension after paracentesis. Admitted to MICU for hypovolemic shock and placed on jasmin, now on floors for management of her HRN and HF.     Seen and examined at bedside with son who is an ER physician.   Pt is well known to me from last admission.    Admitted for Hypovolemic Shock due to high output from abdominal pleurX   hold all antihypertensives  Nausea likely from Constipation, resume home laxatives, zofran PRN, give an additional dose  Discussed code status again in detail. MOLST was rescinded for procedure which led to admission and pressors for shock. Discussed again in detail with pt and son at bedside who chose to sign a new MOLST reinstated DNR/DNI, send to hospital only if pain or severe symptoms cannot me managed. If so, the plan would be to admit to hospice.   DC planning back to [place] with Hospice  discussed with resident in detail

## 2024-07-09 NOTE — DISCHARGE NOTE NURSING/CASE MANAGEMENT/SOCIAL WORK - NSDCPEFALRISK_GEN_ALL_CORE
For information on Fall & Injury Prevention, visit: [email or web address] OR  [email or web address] OR  https://www.cdc.gov/steadi/patient.html

## 2024-07-09 NOTE — PROGRESS NOTE ADULT - SUBJECTIVE AND OBJECTIVE BOX
PROGRESS NOTE:   Authored by Dr. Kana Mckeon MD (PGY-1).     Patient is a 83y old  Female who presents with a chief complaint of Malignant ascites     (08 Jul 2024 14:41)      SUBJECTIVE / OVERNIGHT EVENTS:  No acute events overnight. Pt seen at bedside, not in acute distress. Reports nausea    ADDITIONAL REVIEW OF SYSTEMS:  Patient denies fevers, chills, chest pain, shortness of breath,, abdominal pain, diarrhea, constipation, dysuria, leg swelling, headache, light headedness.    MEDICATIONS  (STANDING):  aspirin enteric coated 81 milliGRAM(s) Oral daily  budesonide 160 MICROgram(s)/formoterol 4.5 MICROgram(s) Inhaler 2 Puff(s) Inhalation two times a day  fluticasone propionate 50 MICROgram(s)/spray Nasal Spray 1 Spray(s) Both Nostrils two times a day  heparin   Injectable 5000 Unit(s) SubCutaneous every 12 hours  levothyroxine 50 MICROGram(s) Oral daily  montelukast 10 milliGRAM(s) Oral daily  nystatin Powder 1 Application(s) Topical two times a day  oxybutynin 10 milliGRAM(s) Oral every 24 hours  polyethylene glycol 3350 17 Gram(s) Oral daily    MEDICATIONS  (PRN):  albuterol    90 MICROgram(s) HFA Inhaler 2 Puff(s) Inhalation every 6 hours PRN Shortness of Breath and/or Wheezing  ondansetron    Tablet 4 milliGRAM(s) Oral every 8 hours PRN Nausea and/or Vomiting      CAPILLARY BLOOD GLUCOSE        I&O's Summary    08 Jul 2024 07:01  -  09 Jul 2024 07:00  --------------------------------------------------------  IN: 0 mL / OUT: 550 mL / NET: -550 mL        PHYSICAL EXAM:  Vital Signs Last 24 Hrs  T(C): 36.8 (09 Jul 2024 04:00), Max: 36.9 (08 Jul 2024 21:47)  T(F): 98.2 (09 Jul 2024 04:00), Max: 98.5 (08 Jul 2024 21:47)  HR: 105 (09 Jul 2024 04:00) (90 - 110)  BP: 102/59 (09 Jul 2024 04:00) (102/59 - 117/59)  BP(mean): --  RR: 18 (09 Jul 2024 04:00) (18 - 18)  SpO2: 92% (09 Jul 2024 04:00) (92% - 97%)    Parameters below as of 09 Jul 2024 04:00  Patient On (Oxygen Delivery Method): nasal cannula        CONSTITUTIONAL: NAD, well-developed  RESPIRATORY: Normal respiratory effort; lungs are clear to auscultation bilaterally  CARDIOVASCULAR: Regular rate and rhythm, normal S1 and S2, no murmur/rub/gallop; 2+ pitting lower extremity edema; Peripheral pulses are 2+ bilaterally  ABDOMEN: +dimple tube, +abdominal distension, Nontender to palpation, normoactive bowel sounds, no rebound/guarding; No hepatosplenomegaly  MSK: no clubbing or cyanosis of digits; no joint swelling or tenderness to palpation  PSYCH: A+O to person, place, and time; affect appropriate    LABS:                      Tele Reviewed:    RADIOLOGY & ADDITIONAL TESTS:  Results Reviewed:   Imaging Personally Reviewed:  Electrocardiogram Personally Reviewed:     PROGRESS NOTE:   Authored by Dr. Kana Mckeon MD (PGY-1).     Patient is a 83y old  Female who presents with a chief complaint of Malignant ascites     (08 Jul 2024 14:41)      SUBJECTIVE / OVERNIGHT EVENTS:  No acute events overnight. Pt seen at bedside, not in acute distress. Reports nausea    ADDITIONAL REVIEW OF SYSTEMS:  Patient denies fevers, chills, chest pain, shortness of breath,, abdominal pain, diarrhea, constipation, dysuria, leg swelling, headache, light headedness.    MEDICATIONS  (STANDING):  aspirin enteric coated 81 milliGRAM(s) Oral daily  budesonide 160 MICROgram(s)/formoterol 4.5 MICROgram(s) Inhaler 2 Puff(s) Inhalation two times a day  fluticasone propionate 50 MICROgram(s)/spray Nasal Spray 1 Spray(s) Both Nostrils two times a day  heparin   Injectable 5000 Unit(s) SubCutaneous every 12 hours  levothyroxine 50 MICROGram(s) Oral daily  montelukast 10 milliGRAM(s) Oral daily  nystatin Powder 1 Application(s) Topical two times a day  oxybutynin 10 milliGRAM(s) Oral every 24 hours  polyethylene glycol 3350 17 Gram(s) Oral daily    MEDICATIONS  (PRN):  albuterol    90 MICROgram(s) HFA Inhaler 2 Puff(s) Inhalation every 6 hours PRN Shortness of Breath and/or Wheezing  ondansetron    Tablet 4 milliGRAM(s) Oral every 8 hours PRN Nausea and/or Vomiting      CAPILLARY BLOOD GLUCOSE        I&O's Summary    08 Jul 2024 07:01  -  09 Jul 2024 07:00  --------------------------------------------------------  IN: 0 mL / OUT: 550 mL / NET: -550 mL        PHYSICAL EXAM:  Vital Signs Last 24 Hrs  T(C): 36.8 (09 Jul 2024 04:00), Max: 36.9 (08 Jul 2024 21:47)  T(F): 98.2 (09 Jul 2024 04:00), Max: 98.5 (08 Jul 2024 21:47)  HR: 105 (09 Jul 2024 04:00) (90 - 110)  BP: 102/59 (09 Jul 2024 04:00) (102/59 - 117/59)  BP(mean): --  RR: 18 (09 Jul 2024 04:00) (18 - 18)  SpO2: 92% (09 Jul 2024 04:00) (92% - 97%)    Parameters below as of 09 Jul 2024 04:00  Patient On (Oxygen Delivery Method): nasal cannula        CONSTITUTIONAL: NAD  RESPIRATORY: Normal respiratory effort; lungs are clear to auscultation bilaterally  CARDIOVASCULAR: Regular rate and rhythm, normal S1 and S2, no murmur/rub/gallop; 2+ pitting lower extremity edema; Peripheral pulses are 2+ bilaterally  ABDOMEN: +dimple tube, +abdominal distension, Nontender to palpation, normoactive bowel sounds, no rebound/guarding; No hepatosplenomegaly  MSK: no clubbing or cyanosis of digits; no joint swelling or tenderness to palpation  PSYCH: A+O to person, place, and time; affect appropriate    LABS:                      Tele Reviewed:    RADIOLOGY & ADDITIONAL TESTS:  Results Reviewed:   Imaging Personally Reviewed:  Electrocardiogram Personally Reviewed:

## 2024-07-09 NOTE — PROGRESS NOTE ADULT - PROBLEM SELECTOR PLAN 1
Hypovolemic 2/2 paracentesis vs anemia less likely septic  POCUS showing collapsed IVC  -s/p Pleurx placement on 7/5 with ~5L drained.    - hemoglobin 7.6 but labs d/c given hospice status change  - CT Angio to assess for peritoneal bleed - no active bleed  - holding metoprolol tartrate Hypovolemic 2/2 paracentesis vs anemia less likely septic  POCUS showing collapsed IVC  -s/p Pleurx placement on 7/5 with ~5L drained.    - hemoglobin 7.6 but labs d/c   - CT Angio to assess for peritoneal bleed - no active bleed  - holding metoprolol tartrate

## 2024-07-09 NOTE — PROGRESS NOTE ADULT - ASSESSMENT
83 year old woman with COPD not on O2, obesity, hypertension, HFpEF, BCC, hypothyroidism, psoriatic arthritis, SUZIE, metastatic ovarian cancer and perforated gallbladder who presenting for hypotension after paracentesis. Admitted to MICU for hypovolemic shock and placed on jasmin, now on floors for management of her HRN and HF.   CV             83 year old woman with COPD not on O2, obesity, hypertension, HFpEF, BCC, hypothyroidism, psoriatic arthritis, SUZIE, metastatic ovarian cancer and perforated gallbladder who presenting for hypotension after paracentesis. Admitted to MICU for hypovolemic shock and placed on jasmin, now on floors for management of her HRN and HF.

## 2024-07-18 PROBLEM — G47.33 OBSTRUCTIVE SLEEP APNEA (ADULT) (PEDIATRIC): Chronic | Status: ACTIVE | Noted: 2024-01-01

## 2024-07-18 PROBLEM — E78.5 HYPERLIPIDEMIA, UNSPECIFIED: Chronic | Status: ACTIVE | Noted: 2024-01-01

## 2024-07-18 PROBLEM — E66.01 MORBID (SEVERE) OBESITY DUE TO EXCESS CALORIES: Chronic | Status: ACTIVE | Noted: 2024-01-01

## 2024-07-18 PROBLEM — M48.00 SPINAL STENOSIS, SITE UNSPECIFIED: Chronic | Status: ACTIVE | Noted: 2024-01-01

## 2024-07-18 PROBLEM — I10 ESSENTIAL (PRIMARY) HYPERTENSION: Chronic | Status: ACTIVE | Noted: 2024-01-01

## 2024-07-18 PROBLEM — J44.9 CHRONIC OBSTRUCTIVE PULMONARY DISEASE, UNSPECIFIED: Chronic | Status: ACTIVE | Noted: 2024-01-01

## 2024-07-18 PROBLEM — I50.32 CHRONIC DIASTOLIC (CONGESTIVE) HEART FAILURE: Chronic | Status: ACTIVE | Noted: 2024-01-01

## 2024-07-18 PROBLEM — I73.9 PERIPHERAL VASCULAR DISEASE, UNSPECIFIED: Chronic | Status: ACTIVE | Noted: 2024-01-01

## 2024-11-20 ENCOUNTER — NON-APPOINTMENT (OUTPATIENT)
Age: 84
End: 2024-11-20

## 2025-04-22 NOTE — ED ADULT NURSE NOTE - HOW OFTEN DO YOU HAVE A DRINK CONTAINING ALCOHOL?
Quality 226: Preventive Care And Screening: Tobacco Use: Screening And Cessation Intervention: Patient screened for tobacco use and is an ex/non-smoker
Detail Level: Detailed
Quality 137: Melanoma: Continuity Of Care - Recall System: Patient information entered into a recall system that includes: target date for the next exam specified AND a process to follow up with patients regarding missed or unscheduled appointments
Quality 431: Preventive Care And Screening: Unhealthy Alcohol Use - Screening: Patient not identified as an unhealthy alcohol user when screened for unhealthy alcohol use using a systematic screening method
Quality 130: Documentation Of Current Medications In The Medical Record: Current Medications Documented
Never